# Patient Record
Sex: FEMALE | Race: WHITE | NOT HISPANIC OR LATINO | Employment: FULL TIME | URBAN - METROPOLITAN AREA
[De-identification: names, ages, dates, MRNs, and addresses within clinical notes are randomized per-mention and may not be internally consistent; named-entity substitution may affect disease eponyms.]

---

## 2017-11-27 ENCOUNTER — OFFICE VISIT (OUTPATIENT)
Dept: OBGYN | Facility: CLINIC | Age: 32
End: 2017-11-27
Payer: COMMERCIAL

## 2017-11-27 VITALS
SYSTOLIC BLOOD PRESSURE: 126 MMHG | DIASTOLIC BLOOD PRESSURE: 82 MMHG | WEIGHT: 111 LBS | HEIGHT: 60 IN | BODY MASS INDEX: 21.79 KG/M2

## 2017-11-27 DIAGNOSIS — Z31.69 ENCOUNTER FOR PRECONCEPTION CONSULTATION: ICD-10-CM

## 2017-11-27 DIAGNOSIS — Z01.419 ENCOUNTER FOR GYNECOLOGICAL EXAMINATION WITHOUT ABNORMAL FINDING: Primary | ICD-10-CM

## 2017-11-27 PROCEDURE — 99395 PREV VISIT EST AGE 18-39: CPT | Performed by: OBSTETRICS & GYNECOLOGY

## 2017-11-27 ASSESSMENT — ANXIETY QUESTIONNAIRES
2. NOT BEING ABLE TO STOP OR CONTROL WORRYING: NOT AT ALL
1. FEELING NERVOUS, ANXIOUS, OR ON EDGE: NOT AT ALL
7. FEELING AFRAID AS IF SOMETHING AWFUL MIGHT HAPPEN: NOT AT ALL
GAD7 TOTAL SCORE: 0
3. WORRYING TOO MUCH ABOUT DIFFERENT THINGS: NOT AT ALL
IF YOU CHECKED OFF ANY PROBLEMS ON THIS QUESTIONNAIRE, HOW DIFFICULT HAVE THESE PROBLEMS MADE IT FOR YOU TO DO YOUR WORK, TAKE CARE OF THINGS AT HOME, OR GET ALONG WITH OTHER PEOPLE: NOT DIFFICULT AT ALL
6. BECOMING EASILY ANNOYED OR IRRITABLE: NOT AT ALL
5. BEING SO RESTLESS THAT IT IS HARD TO SIT STILL: NOT AT ALL

## 2017-11-27 ASSESSMENT — PATIENT HEALTH QUESTIONNAIRE - PHQ9
SUM OF ALL RESPONSES TO PHQ QUESTIONS 1-9: 0
5. POOR APPETITE OR OVEREATING: NOT AT ALL

## 2017-11-27 NOTE — MR AVS SNAPSHOT
"              After Visit Summary   2017    Annie Clark    MRN: [id number]           Patient Information     Date Of Birth          1985        Visit Information        Provider Department      2017 11:00 AM Tana Ireland MD [facility] [place]        Today's Diagnoses     Encounter for gynecological examination without abnormal finding    -  1    Encounter for preconception consultation           Follow-ups after your visit        Who to contact     If you have questions or need follow up information about today's clinic visit or your schedule please contact [facility] directly at [phone].  Normal or non-critical lab and imaging results will be communicated to you by Taketakehart, letter or phone within 4 business days after the clinic has received the results. If you do not hear from us within 7 days, please contact the clinic through Taketakehart or phone. If you have a critical or abnormal lab result, we will notify you by phone as soon as possible.  Submit refill requests through Stylefie or call your pharmacy and they will forward the refill request to us. Please allow 3 business days for your refill to be completed.          Additional Information About Your Visit        MyChart Information     Stylefie lets you send messages to your doctor, view your test results, renew your prescriptions, schedule appointments and more. To sign up, go to [email or web address] . Click on \"Log in\" on the left side of the screen, which will take you to the Welcome page. Then click on \"Sign up Now\" on the right side of the page.     You will be asked to enter the access code listed below, as well as some personal information. Please follow the directions to create your username and password.     Your access code is: [email or web address]  Expires: 2018  1:09 PM     Your access code will  in 90 days. If you need help or a new code, please call your [facility] clinic or " "[phone].        Care EveryWhere ID     This is your Care EveryWhere ID. This could be used by other organizations to access your [facility] medical records  [id number]        Your Vitals Were     Height Breastfeeding? BMI (Body Mass Index)             4' 11.5\" (1.511 m) No 22.04 kg/m2          Blood Pressure from Last 3 Encounters:   11/27/17 126/82   11/23/16 104/70   10/30/15 118/76    Weight from Last 3 Encounters:   11/27/17 111 lb (50.3 kg)   11/23/16 123 lb (55.8 kg)   10/30/15 121 lb (54.9 kg)              Today, you had the following     No orders found for display       Primary Care Provider Office Phone # Fax #    Tana Ireland [phone] [address] [facility] [address]  [place] [address]        Equal Access to Services     [facility]: Hadii aad jacqueline hadasho Sogracie, waaxda luqadaha, qaybta kaalmada adeegyada, eren benitez . So [facility] [phone].    ATENCIÓN: Si habla español, tiene a saha disposición servicios gratuitos de asistencia lingüística. Jorge al [phone].    We comply with applicable federal civil rights laws and [place] laws. We do not discriminate on the basis of race, color, national origin, age, disability, sex, sexual orientation, or gender identity.            Thank you!     Thank you for choosing [facility] [place]  for your care. Our goal is always to provide you with excellent care. Hearing back from our patients is one way we can continue to improve our services. Please take a few minutes to complete the written survey that you may receive in the mail after your visit with us. Thank you!             Your Updated Medication List - Protect others around you: Learn how to safely use, store and throw away your medicines at www.disposemymeds.org.          This list is accurate as of: 11/27/17  1:09 PM.  Always use your most recent med list.                   Brand Name Dispense Instructions for use Diagnosis    desonide " 0.05 % ointment    DESOWEN          loratadine 10 MG tablet    CLARITIN     Take 10 mg by mouth daily        norethindrone-ethinyl estradiol 1.5-30 MG-MCG per tablet    MICROGESTIN    112 tablet    TAKE 1 TABLET BY MOUTH DAILY IN A CONTINUOUS FASHION    Uses oral contraception       PseudoePHEDrine-guaiFENesin 120-1200 MG Tb12

## 2017-11-27 NOTE — PROGRESS NOTES
Annie is a 32 year old  female who presents for annual exam.     Besides routine health maintenance,  she would like to discuss preconception counseling.    HPI:  The patient does not have a PCP    Got  in July and everything went great. Went to [place] for a honeymoon.  Both are starting to feel ready for kids. 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  Has done continuous ocps for years and completely amenorrhea so wondering when to stop ocp and when to try. Just got a 3 month rx filled. Taking a PNV    Patient's dad and his father with prostate cancer in their 60s. B/c of that she is now getting set up for genetic counseling and possibly testing for her as well.    Weren't s.a for 9 months before getting  but were before that. A little discomfort when did on her wedding night but has been fine since the first couple of times.      GYNECOLOGIC HISTORY:    No LMP recorded. Patient is not currently having periods (Reason: Birth Control).  Her current contraception method is: oral contraceptives.  She  reports that she has never smoked. She has never used smokeless tobacco.      Patient is sexually active.  STD testing offered?  Declined  Last PHQ-9 score on record =   PHQ-9 SCORE 2017   Total Score 0     Last GAD7 score on record =   ROBERT-7 SCORE 2017   Total Score 0     Alcohol Score = 3    HEALTH MAINTENANCE:  Cholesterol: (No results found for: CHOL NA  Last Mammo: NA, Result: not applicable, Next Mammo: NA  Pap: (  Lab Results   Component Value Date    PAP NIL 10/30/2015    10/30/15 WNL  Colonoscopy:  NA, Result: not applicable, Next Colonoscopy: NA years.  Dexa:  NA    Health maintenance updated:  yes    HISTORY:  Obstetric History       T0      L0     SAB0   TAB0   Ectopic0   Multiple0   Live Births0           Patient Active Problem List   Diagnosis     Urinary urgency     Contraception, generic  "surveillance     Past Surgical History:   Procedure Laterality Date     C TRANSNASAL EUSTACH TUBE INFLATE,CATH       IP DIET ADULT PUREED       PARTIAL HYMENECTOMY/REVISION HYMENAL RING  3/5/08    Done by Himanshu     TONSILLECTOMY        Social History   Substance Use Topics     Smoking status: Never Smoker     Smokeless tobacco: Never Used     Alcohol use 0.0 oz/week     0 Standard drinks or equivalent per week      Problem (# of Occurrences) Relation (Name,Age of Onset)    CANCER (1) Paternal Grandmother: had uterus removed at age 30 but unsure if it was uterine cancer or something else    DIABETES (1) Maternal Grandfather    Hyperlipidemia (2) Father, Maternal Grandmother    OSTEOPOROSIS (2) Maternal Grandmother, Mother    Prostate Cancer (2) Father (63), Paternal Grandfather:  from it in his 60s    Thyroid Disease (2) Maternal Grandmother, Mother            Current Outpatient Prescriptions   Medication Sig     desonide (DESOWEN) 0.05 % ointment      norethindrone-ethinyl estradiol (MICROGESTIN) 1.5-30 MG-MCG per tablet TAKE 1 TABLET BY MOUTH DAILY IN A CONTINUOUS FASHION     loratadine (CLARITIN) 10 MG tablet Take 10 mg by mouth daily     PseudoePHEDrine-guaiFENesin 120-1200 MG TB12      No current facility-administered medications for this visit.      Allergies   Allergen Reactions     Nuts      Thimerosal        Past medical, surgical, social and family histories were reviewed and updated in EP[facility].    ROS:   12 point review of systems negative other than symptoms noted below.    EXAM:  /82  Ht 4' 11.5\" (1.511 m)  Wt 111 lb (50.3 kg)  Breastfeeding? No  BMI 22.04 kg/m2   BMI: Body mass index is 22.04 kg/(m^2).    PHYSICAL EXAM:  Constitutional:  Appearance: Well nourished, well developed, alert, in no acute distress  Neck:  Lymph Nodes:  No lymphadenopathy present    Thyroid:  Gland size normal, nontender, no nodules or masses present  on palpation  Chest:  Respiratory Effort:  Breathing " unlabored  Cardiovascular:    Heart: Auscultation:  Regular rate, normal rhythm, no murmurs present  Breasts: Palpation of Breasts and Axillae:  No masses present on palpation, no breast tenderness. and No nodularity, asymmetry or nipple discharge bilaterally.  Gastrointestinal:   Abdominal Examination:  Abdomen nontender to palpation, tone normal without rigidity or guarding, no masses present, umbilicus without lesions   Liver and Spleen:  No hepatomegaly present, liver nontender to palpation    Hernias:  No hernias present  Lymphatic: Lymph Nodes:  No other lymphadenopathy present  Skin:  General Inspection:  No rashes present, no lesions present, no areas of  discoloration    Genitalia and Groin:  No rashes present, no lesions present, no areas of  discoloration, no masses present  Neurologic/Psychiatric:    Mental Status:  Oriented X3     Pelvic Exam:  External Genitalia:     Normal appearance for age, no discharge present, no tenderness present, no inflammatory lesions present, color normal  Vagina:     Normal vaginal vault without central or paravaginal defects, no discharge present, no inflammatory lesions present, no masses present  Bladder:     Nontender to palpation  Urethra:   Urethral Body:  Urethra palpation normal, urethra structural support normal   Urethral Meatus:  No erythema or lesions present  Cervix:     Appearance healthy, no lesions present, nontender to palpation, no bleeding present  Uterus:     Uterus: firm, normal sized and nontender, retroverted in position.   Adnexa:     No adnexal tenderness present, no adnexal masses present  Perineum:     Perineum within normal limits, no evidence of trauma, no rashes or skin lesions present  Anus:     Anus within normal limits, no hemorrhoids present  Inguinal Lymph Nodes:     No lymphadenopathy present  Pubic Hair:     Normal pubic hair distribution for age  Genitalia and Groin:     No rashes present, no lesions present, no areas of discoloration,  no masses present      COUNSELING:   Reviewed preventive health counseling, as reflected in patient instructions  Special attention given to:        Family planning    BMI: Body mass index is 22.04 kg/(m^2).        ASSESSMENT:  32 year old female with satisfactory annual exam.    ICD-10-CM    1. Encounter for gynecological examination without abnormal finding Z01.419    2. Encounter for preconception consultation Z31.69        PLAN:  Pap and cotesting will be due next year  Continue PNV. Would need to get pregnant end of may/beginning of June to delivery when she'd like. Encouraged to stop ocps only when ready to do so. B/c has done cont so long and no periods may just stop them a couple months in advance to get regulated and use condoms during that time. If wants more refills on ocps will contact us but may just do these next three months and then stop  Will let me know once genetic testing is back if anything is positive that could impact her health and need certain screening tests like mammo earlier than normal.    Tana Ireland MD

## 2017-11-28 ASSESSMENT — ANXIETY QUESTIONNAIRES: GAD7 TOTAL SCORE: 0

## 2018-01-09 ENCOUNTER — TRANSFERRED RECORDS (OUTPATIENT)
Dept: HEALTH INFORMATION MANAGEMENT | Facility: CLINIC | Age: 33
End: 2018-01-09

## 2018-10-29 ENCOUNTER — TELEPHONE (OUTPATIENT)
Dept: OBGYN | Facility: CLINIC | Age: 33
End: 2018-10-29

## 2018-10-29 NOTE — TELEPHONE ENCOUNTER
LMP 9/25/18. Pt is wondering if she can get her hair highlighted tomorrow. Pt had pos UPT yesterday. Pt informed it is OK to have hair highlighted. Salons are well ventilated. No further questions.

## 2018-11-13 ENCOUNTER — TELEPHONE (OUTPATIENT)
Dept: OBGYN | Facility: CLINIC | Age: 33
End: 2018-11-13

## 2018-11-13 NOTE — TELEPHONE ENCOUNTER
Pt is moving into a house in a week and some relatives were coming over to paint for them.  Pt is wondering how long she needs to stay out of the house because of the paint fumes.

## 2018-11-13 NOTE — TELEPHONE ENCOUNTER
Pt is newly pregnant. Moving to new house. Painting is to be done. Worried about paint fumes. Pt advised as long as she has fresh source of air she is OK. If she is more comfortable leaving stay away for about 24 hours and then can come back to house. No further questions.

## 2018-12-03 ENCOUNTER — PRENATAL OFFICE VISIT (OUTPATIENT)
Dept: OBGYN | Facility: CLINIC | Age: 33
End: 2018-12-03
Payer: COMMERCIAL

## 2018-12-03 ENCOUNTER — TRANSFERRED RECORDS (OUTPATIENT)
Dept: HEALTH INFORMATION MANAGEMENT | Facility: CLINIC | Age: 33
End: 2018-12-03

## 2018-12-03 VITALS
HEIGHT: 60 IN | DIASTOLIC BLOOD PRESSURE: 76 MMHG | HEART RATE: 88 BPM | BODY MASS INDEX: 21.44 KG/M2 | SYSTOLIC BLOOD PRESSURE: 132 MMHG | WEIGHT: 109.2 LBS

## 2018-12-03 DIAGNOSIS — Z13.79 GENETIC SCREENING: ICD-10-CM

## 2018-12-03 DIAGNOSIS — Q51.3 UTERUS BICORNIS AFFECTING PREGNANCY IN FIRST TRIMESTER: ICD-10-CM

## 2018-12-03 DIAGNOSIS — Z23 NEED FOR PROPHYLACTIC VACCINATION AND INOCULATION AGAINST INFLUENZA: ICD-10-CM

## 2018-12-03 DIAGNOSIS — O34.01 UTERUS BICORNIS AFFECTING PREGNANCY IN FIRST TRIMESTER: ICD-10-CM

## 2018-12-03 DIAGNOSIS — O09.91 SUPERVISION OF HIGH RISK PREGNANCY IN FIRST TRIMESTER: Primary | ICD-10-CM

## 2018-12-03 LAB
ABO + RH BLD: NORMAL
ABO + RH BLD: NORMAL
ALBUMIN UR-MCNC: NEGATIVE MG/DL
APPEARANCE UR: CLEAR
BILIRUB UR QL STRIP: NEGATIVE
BLD GP AB SCN SERPL QL: NORMAL
BLOOD BANK CMNT PATIENT-IMP: NORMAL
COLOR UR AUTO: YELLOW
ERYTHROCYTE [DISTWIDTH] IN BLOOD BY AUTOMATED COUNT: 11.2 % (ref 10–15)
GLUCOSE UR STRIP-MCNC: NEGATIVE MG/DL
HCT VFR BLD AUTO: 42.2 % (ref 35–47)
HGB BLD-MCNC: 14.9 G/DL (ref 11.7–15.7)
HGB UR QL STRIP: NEGATIVE
KETONES UR STRIP-MCNC: NEGATIVE MG/DL
LEUKOCYTE ESTERASE UR QL STRIP: NEGATIVE
MCH RBC QN AUTO: 31.2 PG (ref 26.5–33)
MCHC RBC AUTO-ENTMCNC: 35.3 G/DL (ref 31.5–36.5)
MCV RBC AUTO: 89 FL (ref 78–100)
NITRATE UR QL: NEGATIVE
PH UR STRIP: 7.5 PH (ref 5–7)
PLATELET # BLD AUTO: 237 10E9/L (ref 150–450)
RBC # BLD AUTO: 4.77 10E12/L (ref 3.8–5.2)
SOURCE: ABNORMAL
SP GR UR STRIP: 1.01 (ref 1–1.03)
SPECIMEN EXP DATE BLD: NORMAL
UROBILINOGEN UR STRIP-ACNC: 0.2 EU/DL (ref 0.2–1)
WBC # BLD AUTO: 9.5 10E9/L (ref 4–11)

## 2018-12-03 PROCEDURE — 86900 BLOOD TYPING SEROLOGIC ABO: CPT | Performed by: OBSTETRICS & GYNECOLOGY

## 2018-12-03 PROCEDURE — 86901 BLOOD TYPING SEROLOGIC RH(D): CPT | Performed by: OBSTETRICS & GYNECOLOGY

## 2018-12-03 PROCEDURE — 87340 HEPATITIS B SURFACE AG IA: CPT | Performed by: OBSTETRICS & GYNECOLOGY

## 2018-12-03 PROCEDURE — 90471 IMMUNIZATION ADMIN: CPT | Performed by: OBSTETRICS & GYNECOLOGY

## 2018-12-03 PROCEDURE — 87086 URINE CULTURE/COLONY COUNT: CPT | Performed by: OBSTETRICS & GYNECOLOGY

## 2018-12-03 PROCEDURE — 87389 HIV-1 AG W/HIV-1&-2 AB AG IA: CPT | Performed by: OBSTETRICS & GYNECOLOGY

## 2018-12-03 PROCEDURE — 86780 TREPONEMA PALLIDUM: CPT | Performed by: OBSTETRICS & GYNECOLOGY

## 2018-12-03 PROCEDURE — 81003 URINALYSIS AUTO W/O SCOPE: CPT | Performed by: OBSTETRICS & GYNECOLOGY

## 2018-12-03 PROCEDURE — 86850 RBC ANTIBODY SCREEN: CPT | Performed by: OBSTETRICS & GYNECOLOGY

## 2018-12-03 PROCEDURE — 99207 ZZC FIRST OB VISIT: CPT | Performed by: OBSTETRICS & GYNECOLOGY

## 2018-12-03 PROCEDURE — 36415 COLL VENOUS BLD VENIPUNCTURE: CPT | Performed by: OBSTETRICS & GYNECOLOGY

## 2018-12-03 PROCEDURE — 86762 RUBELLA ANTIBODY: CPT | Performed by: OBSTETRICS & GYNECOLOGY

## 2018-12-03 PROCEDURE — 85027 COMPLETE CBC AUTOMATED: CPT | Performed by: OBSTETRICS & GYNECOLOGY

## 2018-12-03 PROCEDURE — 90686 IIV4 VACC NO PRSV 0.5 ML IM: CPT | Performed by: OBSTETRICS & GYNECOLOGY

## 2018-12-03 ASSESSMENT — ANXIETY QUESTIONNAIRES
2. NOT BEING ABLE TO STOP OR CONTROL WORRYING: NOT AT ALL
3. WORRYING TOO MUCH ABOUT DIFFERENT THINGS: NOT AT ALL
1. FEELING NERVOUS, ANXIOUS, OR ON EDGE: NOT AT ALL
5. BEING SO RESTLESS THAT IT IS HARD TO SIT STILL: NOT AT ALL
7. FEELING AFRAID AS IF SOMETHING AWFUL MIGHT HAPPEN: NOT AT ALL
6. BECOMING EASILY ANNOYED OR IRRITABLE: NOT AT ALL
IF YOU CHECKED OFF ANY PROBLEMS ON THIS QUESTIONNAIRE, HOW DIFFICULT HAVE THESE PROBLEMS MADE IT FOR YOU TO DO YOUR WORK, TAKE CARE OF THINGS AT HOME, OR GET ALONG WITH OTHER PEOPLE: NOT DIFFICULT AT ALL
GAD7 TOTAL SCORE: 0

## 2018-12-03 ASSESSMENT — PATIENT HEALTH QUESTIONNAIRE - PHQ9
5. POOR APPETITE OR OVEREATING: NOT AT ALL
SUM OF ALL RESPONSES TO PHQ QUESTIONS 1-9: 0

## 2018-12-03 NOTE — PROGRESS NOTES
Injectable Influenza Immunization Documentation    1.  Is the person to be vaccinated sick today?   No    2. Does the person to be vaccinated have an allergy to a component   of the vaccine?   No  Egg Allergy Algorithm Link    3. Has the person to be vaccinated ever had a serious reaction   to influenza vaccine in the past?   No    4. Has the person to be vaccinated ever had Guillain-Barré syndrome?   No    Form completed by Leila Hendricks CMA on 12/3/2018 at 3:23 PM

## 2018-12-03 NOTE — Clinical Note
Patient had her viability U/S at [facility] and told heart shaped uterus. I was able to review images and though they didn't capture exactly what I needed which was a full frontal/coronal look at the uterus it appears to be a true bicornuate. Could we call her and tell her that I reviewed images and it looks bicornuate. What i'd like to do is have her come back in 2-3 weeks as we planned but rather than just doing doptones i'd like her to have an U/S with our techs(hayley ledesma) to get additional images to I can determine exactly how separate the horns are or if it's just a deep septum and then we can discuss more from there. Hopefully they can just add an U/S on before her appointment with me.

## 2018-12-03 NOTE — PROGRESS NOTES
"  SUBJECTIVE:     HPI:    This is a 33 year old female patient,  who presents for her first obstetrical visit.    JAMES: 2019, by Last Menstrual Period.  She is 9w6d weeks.  Her cycles are regular.  Her last menstrual period was normal.   Since her LMP, she has experienced  fatigue and tenderness, dizziness).   She denies nausea, emesis, abdominal pain, headache, loss of appetite, vaginal discharge, dysuria, pelvic pain, urinary urgency, lightheadedness, urinary frequency, vaginal bleeding, hemorrhoids and constipation.    Additional History: patient has had significant dysmenorrhea in the past but ocps always helped. Has never had an U/S  Patient had her viability U/S at [facility] today b/c our tech was out and they told her she had a \"heart shaped uterus\". Never knew that before  Has not had cramping or bleeding or pain.  Definitely bad breast tenderness and fatigue. Tiny waves of mild nausea but really not bad  Periods were regular and her LMP edc and her U/S edc are right on with each other  Discussed genetic testing. Likely won't do it b/c wouldn't ever terminate and don't want to worry about results  Needs a flu shot    Have you travelled during the pregnancy?No  Have your sexual partner(s) travelled during the pregnancy?No      HISTORY:   Planned Pregnancy: Yes  Marital Status:   Occupation: Special Education Para  Living in Household: Spouse    Past History:  Her past medical history   Past Medical History:   Diagnosis Date     Monoallelic mutation of BRIP1 gene      Urinary urgency 10/30/2015   .      She has a history of  First pregnancy    Since her last LMP she denies use of alcohol, tobacco and street drugs.    Past medical, surgical, social and family history were reviewed and updated in [facility].        Current Outpatient Prescriptions   Medication     loratadine (CLARITIN) 10 MG tablet     desonide (DESOWEN) 0.05 % ointment     norethindrone-ethinyl estradiol (MICROGESTIN) 1.5-30 MG-MCG per " "tablet     PseudoePHEDrine-guaiFENesin 120-1200 MG TB12     No current facility-administered medications for this visit.        ROS:   12 point review of systems negative other than symptoms noted below.  Constitutional: Fatigue  Breast: Tenderness  Neurologic: Dizziness      OBJECTIVE:     EXAM:  /76 (BP Location: Right arm, Patient Position: Sitting, Cuff Size: Adult Regular)  Pulse 88  Ht 4' 11.75\" (1.518 m)  Wt 109 lb 3.2 oz (49.5 kg)  LMP 09/25/2018  BMI 21.51 kg/m2 Body mass index is 21.51 kg/(m^2).    GENERAL: healthy, alert and no distress  NECK: no adenopathy, no asymmetry, masses, or scars and thyroid normal to palpation  RESP: lungs clear to auscultation - no rales, rhonchi or wheezes  BREAST: normal without masses, tenderness or nipple discharge and no palpable axillary masses or adenopathy  CV: regular rate and rhythm, normal S1 S2, no S3 or S4, no murmur, click or rub, no peripheral edema and peripheral pulses strong  ABDOMEN: soft, nontender, no hepatosplenomegaly, no masses and bowel sounds normal  MS: no gross musculoskeletal defects noted, no edema  SKIN: no suspicious lesions or rashes  NEURO: Normal strength and tone, mentation intact and speech normal  PSYCH: mentation appears normal, affect normal/bright    ASSESSMENT/PLAN:       ICD-10-CM    1. Supervision of high risk pregnancy in first trimester O09.91 ABO/Rh type and screen     Hepatitis B surface antigen     CBC with platelets     HIV Antigen Antibody Combo     Rubella Antibody IgG Quantitative     Treponema Abs w Reflex to RPR and Titer     Urine Culture Aerobic Bacterial     *UA reflex to Microscopic     US OB Transvaginal Only   2. 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 Genetic screening Z13.79    4. Need for prophylactic vaccination and inoculation against influenza Z23 ABO/Rh type and screen     Hepatitis B surface antigen     CBC with platelets     HIV Antigen " Antibody Combo     Rubella Antibody IgG Quantitative     Treponema Abs w Reflex to RPR and Titer     FLU VACCINE, SPLIT VIRUS, IM (QUADRIVALENT) [26362]- >3 YRS     Vaccine Administration, Initial [63583]       33 year old , 9w6d weeks of pregnancy with JAMES of 2019, by Last Menstrual Period and ultrasound          PLAN/PATIENT INSTRUCTIONS:    Patient's EDC is right on track with U/S from LMP so will use   Can't access [facility] portal to look at her U/S to eval her uterus at this time but will connect with her after I am able to see the actual images and not just the report and let her know my opinion on it.  Discussed that sometimes with uterine issues like this there is a higher risk of  labor but also malposition like breech and sometimes labor dystocia. Could also be higher risk for PPH, etc. Will continue to assess this as we progress in pregnancy  NOB labs and flu shot today. Declines progenity test but if changes her mind can contact us  Return in 2 weeks for doptones and then will get back on track with q4 week visits.    ADDENDUM:after patient left was able to view images from [facility]. Does appear to have a true bicornuate with preg in left horn. However there was mostly transverse images and not a great frontal image to assess degree of horn separation vs deep septum. Not didelphys b/c only has one cervix. Will likely reimage her at her next visit with our tech here and get better coronal views to determine that.  No specific interventions recommended but just more close monitoring for si/sx of PTL     Tana Ireland MD      Prenatal OB Questionnaire      Allergies as of 12/3/2018:    Allergies as of 2018 - Eliseo as Reviewed 2018   Allergen Reaction Noted     Nuts  10/30/2015     Thimerosal  10/30/2015       Current medications are:  Current Outpatient Prescriptions   Medication Sig Dispense Refill     loratadine (CLARITIN) 10 MG tablet Take 10 mg by mouth daily        desonide (DESOWEN) 0.05 % ointment        norethindrone-ethinyl estradiol (MICROGESTIN) 1.5-30 MG-MCG per tablet TAKE 1 TABLET BY MOUTH DAILY IN A CONTINUOUS FASHION (Patient not taking: Reported on 12/3/2018) 112 tablet 4     PseudoePHEDrine-guaiFENesin 120-1200 MG TB12            Early ultrasound screening tool:    Does patient have irregular periods?  No  Did patient use hormonal birth control in the three months prior to positive urine pregnancy test? No  Is the patient breastfeeding?  No  Is the patient 10 weeks or greater at time of education visit?  No    Viable IUP at 9w6d

## 2018-12-03 NOTE — MR AVS SNAPSHOT
After Visit Summary   12/3/2018    Annie Clark    MRN: [id number]           Patient Information     Date Of Birth          1985        Visit Information        Provider Department      12/3/2018 1:30 PM Tana Ireland MD; WE TRIAGE [facility]        Today's Diagnoses     Encounter for supervision of normal first pregnancy in first trimester    -  1    Genetic screening        Need for prophylactic vaccination and inoculation against influenza        Screening for malignant neoplasm of cervix           Follow-ups after your visit        Your next 10 appointments already scheduled     Dec 26, 2018  1:10 PM CST   ESTABLISHED PRENATAL with Tana Ireland MD   [facility] ([facility])    [address] [place] [address] [address]  [facility] [address]   [phone]              Who to contact     If you have questions or need follow up information about today's clinic visit or your schedule please contact [facility] directly at [phone].  Normal or non-critical lab and imaging results will be communicated to you by Cortexhart, letter or phone within 4 business days after the clinic has received the results. If you do not hear from us within 7 days, please contact the clinic through Cortexhart or phone. If you have a critical or abnormal lab result, we will notify you by phone as soon as possible.  Submit refill requests through SearchMan SEO or call your pharmacy and they will forward the refill request to us. Please allow 3 business days for your refill to be completed.          Additional Information About Your Visit        Cortexhart Information     [employer] gives you secure access to your electronic health record. If you see a primary care provider, you can also send messages to your care team and make appointments. If you have questions, please call your primary care clinic.  If you do not have a primary care provider, please call  "[phone] and they will assist you.        Care EveryWhere ID     This is your Care EveryWhere ID. This could be used by other organizations to access your [facility] medical records  [id number]        Your Vitals Were     Pulse Height Last Period BMI (Body Mass Index)          88 4' 11.75\" (1.518 m) 09/25/2018 21.51 kg/m2         Blood Pressure from Last 3 Encounters:   12/03/18 132/76   11/27/17 126/82   11/23/16 104/70    Weight from Last 3 Encounters:   12/03/18 109 lb 3.2 oz (49.5 kg)   11/27/17 111 lb (50.3 kg)   11/23/16 123 lb (55.8 kg)              We Performed the Following     *UA reflex to Microscopic     ABO/Rh type and screen     CBC with platelets     Hepatitis B surface antigen     HIV Antigen Antibody Combo     Rubella Antibody IgG Quantitative     Treponema Abs w Reflex to RPR and Titer     Urine Culture Aerobic Bacterial        Primary Care Provider Office Phone # Fax #    Tana Ireland [phone] [address] [facility] [address]  [facility] [address]        Equal Access to Services     [facility]: Hadii aad ku hadasho Sogracie, waaxda luqadaha, qaybta kaalmada christian, eren benitez . So [facility] [phone].    ATENCIÓN: Si habla español, tiene a saha disposición servicios gratuitos de asistencia lingüística. [facility] [phone].    We comply with applicable federal civil rights laws and [place] laws. We do not discriminate on the basis of race, color, national origin, age, disability, sex, sexual orientation, or gender identity.            Thank you!     Thank you for choosing [facility]  for your care. Our goal is always to provide you with excellent care. Hearing back from our patients is one way we can continue to improve our services. Please take a few minutes to complete the written survey that you may receive in the mail after your visit with us. Thank you!             Your Updated Medication List - Protect others around " you: Learn how to safely use, store and throw away your medicines at www.disposemymeds.org.          This list is accurate as of 12/3/18  3:07 PM.  Always use your most recent med list.                   Brand Name Dispense Instructions for use Diagnosis    desonide 0.05 % external ointment    DESOWEN          loratadine 10 MG tablet    CLARITIN     Take 10 mg by mouth daily        norethindrone-ethinyl estradiol 1.5-30 MG-MCG tablet    MICROGESTIN    112 tablet    TAKE 1 TABLET BY MOUTH DAILY IN A CONTINUOUS FASHION    Uses oral contraception       PseudoePHEDrine-guaiFENesin 120-1200 MG Tb12

## 2018-12-04 LAB
BACTERIA SPEC CULT: NO GROWTH
HBV SURFACE AG SERPL QL IA: NONREACTIVE
HIV 1+2 AB+HIV1 P24 AG SERPL QL IA: NONREACTIVE
Lab: NORMAL
RUBV IGG SERPL IA-ACNC: 53 IU/ML
SPECIMEN SOURCE: NORMAL
T PALLIDUM AB SER QL: NONREACTIVE

## 2018-12-04 ASSESSMENT — ANXIETY QUESTIONNAIRES: GAD7 TOTAL SCORE: 0

## 2018-12-06 ENCOUNTER — TELEPHONE (OUTPATIENT)
Dept: OBGYN | Facility: CLINIC | Age: 33
End: 2018-12-06

## 2018-12-06 PROBLEM — O09.891 SUPERVISION OF OTHER HIGH RISK PREGNANCIES, FIRST TRIMESTER: Status: RESOLVED | Noted: 2018-12-06 | Resolved: 2018-12-06

## 2018-12-06 PROBLEM — O34.00: Status: ACTIVE | Noted: 2018-12-06

## 2018-12-06 PROBLEM — O09.90 PREGNANCY, SUPERVISION, HIGH-RISK: Status: ACTIVE | Noted: 2018-12-06

## 2018-12-06 PROBLEM — Q51.3: Status: ACTIVE | Noted: 2018-12-06

## 2018-12-06 PROBLEM — O09.891 SUPERVISION OF OTHER HIGH RISK PREGNANCIES, FIRST TRIMESTER: Status: ACTIVE | Noted: 2018-12-06

## 2018-12-06 NOTE — TELEPHONE ENCOUNTER
Pt informed. Will come for US at 12:30. Also asked about using cleaning supplies. Informed to be in well ventilated area.

## 2018-12-06 NOTE — TELEPHONE ENCOUNTER
Tana Ireland MD  P We Triage                     Patient had her viability U/S at sub rad and told heart shaped uterus. I was able to review images and though they didn't capture exactly what I needed which was a full frontal/coronal look at the uterus it appears to be a true bicornuate. Could we call her and tell her that I reviewed images and it looks bicornuate. What i'd like to do is have her come back in 2-3 weeks as we planned but rather than just doing doptones i'd like her to have an U/S with our techs(hayley ledesma) to get additional images to I can determine exactly how separate the horns are or if it's just a deep septum and then we can discuss more from there. Hopefully they can just add an U/S on before her appointment with me.              Prenatal Office Visit for Prenatal Care; Imm/Inj  12/3/2018       Tana Ireland MD - [facility] [place]; 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; Genetic screening; Need for prophylactic vaccination and inoculation against influenza                Orders Signed This Encounter (11) View All Results      US OB Transvaginal Only        Vaccine Administration, Initial [07070]        FLU VACCINE, SPLIT VIRUS, IM (QUADRIVALENT) [77025]- >3 YRS        *UA reflex to Microscopic View Result       Urine Culture Aerobic Bacterial View Result       Treponema Abs w Reflex to RPR and Titer View Result       Rubella Antibody IgG Quantitative View Result       HIV Antigen Antibody Combo View Result       CBC with platelets View Result       Hepatitis B surface antigen View Result       ABO/Rh type and screen View Result                Orders Pended This Encounter         Left msg for pt to call back. Pt has been scheduled for an US on 11/26 before her appt with Dr. Ireland at 1230. Pt should arrive around 1215.  This time slot has been approved by Shweta in [place].  Crystal De Guzman RN on 12/6/2018 at 9:28 AM

## 2018-12-26 ENCOUNTER — ANCILLARY PROCEDURE (OUTPATIENT)
Dept: ULTRASOUND IMAGING | Facility: CLINIC | Age: 33
End: 2018-12-26
Payer: COMMERCIAL

## 2018-12-26 ENCOUNTER — PRENATAL OFFICE VISIT (OUTPATIENT)
Dept: OBGYN | Facility: CLINIC | Age: 33
End: 2018-12-26
Payer: COMMERCIAL

## 2018-12-26 VITALS — WEIGHT: 109.8 LBS | BODY MASS INDEX: 21.62 KG/M2 | SYSTOLIC BLOOD PRESSURE: 104 MMHG | DIASTOLIC BLOOD PRESSURE: 60 MMHG

## 2018-12-26 DIAGNOSIS — Q51.3 UTERUS BICORNIS AFFECTING PREGNANCY IN FIRST TRIMESTER: ICD-10-CM

## 2018-12-26 DIAGNOSIS — O34.01 UTERUS BICORNIS AFFECTING PREGNANCY IN FIRST TRIMESTER: ICD-10-CM

## 2018-12-26 DIAGNOSIS — O09.91 SUPERVISION OF HIGH RISK PREGNANCY IN FIRST TRIMESTER: ICD-10-CM

## 2018-12-26 DIAGNOSIS — O09.91 SUPERVISION OF HIGH RISK PREGNANCY IN FIRST TRIMESTER: Primary | ICD-10-CM

## 2018-12-26 PROCEDURE — 99207 ZZC PRENATAL VISIT: CPT | Performed by: OBSTETRICS & GYNECOLOGY

## 2018-12-26 PROCEDURE — 76817 TRANSVAGINAL US OBSTETRIC: CPT | Performed by: OBSTETRICS & GYNECOLOGY

## 2018-12-26 NOTE — PROGRESS NOTES
Patient has questions about medication intake. Has congestion. She wants to know if there are specific vitamins she should also be taking other than prenatal vitamins.

## 2018-12-28 NOTE — PROGRESS NOTES
Patient is feeling well. Breast pain is better and fatigue is better. Never really had all that much nausea  rescanned today as her first viability U/S was at [facility] and images weren't very clear  Patient does have a full bicornuate uterus with pregnancy on the left and tracking right along in terms of dates  Discussed again the higher risk of PTL, malpresentation, c/s need, etc. Unlikely to have any issues in the first few months and more than likely will get to at least viability w/o PTL issues and then will proceed with testing and  surveillance as indicated  Return 3 weeks and offer AFP/Quad though likely will decline

## 2019-01-14 ENCOUNTER — PRENATAL OFFICE VISIT (OUTPATIENT)
Dept: OBGYN | Facility: CLINIC | Age: 34
End: 2019-01-14
Payer: COMMERCIAL

## 2019-01-14 VITALS — BODY MASS INDEX: 21.94 KG/M2 | DIASTOLIC BLOOD PRESSURE: 62 MMHG | WEIGHT: 111.4 LBS | SYSTOLIC BLOOD PRESSURE: 110 MMHG

## 2019-01-14 DIAGNOSIS — O09.92 SUPERVISION OF HIGH RISK PREGNANCY IN SECOND TRIMESTER: Primary | ICD-10-CM

## 2019-01-14 DIAGNOSIS — O34.02 UTERUS BICORNIS AFFECTING PREGNANCY IN SECOND TRIMESTER: ICD-10-CM

## 2019-01-14 DIAGNOSIS — Q51.3 UTERUS BICORNIS AFFECTING PREGNANCY IN SECOND TRIMESTER: ICD-10-CM

## 2019-01-14 PROCEDURE — 99207 ZZC PRENATAL VISIT: CPT | Performed by: OBSTETRICS & GYNECOLOGY

## 2019-01-15 NOTE — PROGRESS NOTES
"Patient is feeling really well. Fatigue is much better for most part though still occasionally  Not getting any bad HAs and nausea is gone  Finally had a couple pounds of weight gain  Discussed lots of things related to how a c/s is done and the recovery. 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  Understands there's no way to predict that at this point given the biccornuate uterus but talked through some of the signs we would look for  Discussed quad and AFP and declines them both  Return 4 weeks with anatomy scan and look more closely at how the bicornuate is \"stretching\" and fetal position, etc  "

## 2019-01-27 ENCOUNTER — NURSE TRIAGE (OUTPATIENT)
Dept: NURSING | Facility: CLINIC | Age: 34
End: 2019-01-27

## 2019-01-27 NOTE — TELEPHONE ENCOUNTER
Patient is 18 weeks pregnant, has had nasal congestion and scratchy throat for past 4-5 days, no fever. Reviewed list of meds OK for pregnancy from One-Note. Patient verbalized understanding.  Sharla Maxwell RN  [facility]

## 2019-01-28 ENCOUNTER — TELEPHONE (OUTPATIENT)
Dept: OBGYN | Facility: CLINIC | Age: 34
End: 2019-01-28

## 2019-01-28 DIAGNOSIS — J02.9 SORE THROAT: Primary | ICD-10-CM

## 2019-01-28 RX ORDER — BENZONATATE 100 MG/1
100 CAPSULE ORAL 3 TIMES DAILY PRN
Qty: 30 CAPSULE | Refills: 0 | Status: SHIPPED | OUTPATIENT
Start: 2019-01-28 | End: 2019-02-13

## 2019-01-28 NOTE — TELEPHONE ENCOUNTER
Primip 17w6d  Pt calling regarding symptoms of nasal congestion, productive cough (has improved), red, swollen throat since last Friday. Afebrile.  Spoke with FNA yesterday as well. Pt has been using sudafed-tried mucinex today, afrin, tylenol, throat lozenges, is using a humidifer.  Pt is staying hydrated, tolerating tea, soup, popsicles, ice chips. Advised pt she could also try gargling salt water for comfort.   Pt is mostly concerned about her red, swollen, throat. Discussed that sounds more viral and the throat is probably irritated due to drainage and coughing.     Will route to provider to advise if any further advice for pt.  Will call pt back with any further recommendations o/w can close encounter    Pt knows to call back if symptoms do not improve or worsen    Crystal De Guzman RN on 1/28/2019 at 1:46 PM

## 2019-01-28 NOTE — TELEPHONE ENCOUNTER
Pt informed. Would like to have Rx for Tessalon pereles. Rx sent. Pt will think about throat culture and call tomorrow if wants to come in.

## 2019-01-28 NOTE — TELEPHONE ENCOUNTER
There is definitely a virus going around with all of these sx. Could come in for a rapid strep and throat culture. 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 O/w can try some tessalon pereles, it's an rx. Helps to numb the back of the throat a bit and helps with cough and sore throat. Warm honey tea can soothe the throat as well

## 2019-02-13 ENCOUNTER — PRENATAL OFFICE VISIT (OUTPATIENT)
Dept: OBGYN | Facility: CLINIC | Age: 34
End: 2019-02-13
Payer: COMMERCIAL

## 2019-02-13 ENCOUNTER — ANCILLARY PROCEDURE (OUTPATIENT)
Dept: ULTRASOUND IMAGING | Facility: CLINIC | Age: 34
End: 2019-02-13
Payer: COMMERCIAL

## 2019-02-13 VITALS — BODY MASS INDEX: 22.65 KG/M2 | WEIGHT: 115 LBS | SYSTOLIC BLOOD PRESSURE: 96 MMHG | DIASTOLIC BLOOD PRESSURE: 66 MMHG

## 2019-02-13 DIAGNOSIS — O09.92 SUPERVISION OF HIGH RISK PREGNANCY IN SECOND TRIMESTER: ICD-10-CM

## 2019-02-13 DIAGNOSIS — Q51.3 UTERUS BICORNIS AFFECTING PREGNANCY IN SECOND TRIMESTER: ICD-10-CM

## 2019-02-13 DIAGNOSIS — O34.02 UTERUS BICORNIS AFFECTING PREGNANCY IN SECOND TRIMESTER: ICD-10-CM

## 2019-02-13 DIAGNOSIS — O09.92 SUPERVISION OF HIGH RISK PREGNANCY IN SECOND TRIMESTER: Primary | ICD-10-CM

## 2019-02-13 DIAGNOSIS — O43.192 MARGINAL INSERTION OF UMBILICAL CORD AFFECTING MANAGEMENT OF MOTHER IN SECOND TRIMESTER: ICD-10-CM

## 2019-02-13 DIAGNOSIS — R05.9 COUGH: ICD-10-CM

## 2019-02-13 PROCEDURE — 99207 ZZC PRENATAL VISIT: CPT | Performed by: OBSTETRICS & GYNECOLOGY

## 2019-02-13 PROCEDURE — 76805 OB US >/= 14 WKS SNGL FETUS: CPT | Performed by: OBSTETRICS & GYNECOLOGY

## 2019-02-13 RX ORDER — CODEINE PHOSPHATE AND GUAIFENESIN 10; 100 MG/5ML; MG/5ML
1-2 SOLUTION ORAL EVERY 4 HOURS PRN
Qty: 180 ML | Refills: 0 | Status: SHIPPED | OUTPATIENT
Start: 2019-02-13 | End: 2019-03-13

## 2019-02-13 NOTE — PROGRESS NOTES
Normal anatomy U/S today but b/c of head position the Sept. Pellucidum was not well seen nor the NF. Overall everything else was normal  Cephalic presentation, posterior placenta, long closed cervix. The umbilical cord is marginally inserted on the placenta  Given bicornuate uterus and now the marginal cord insert will plan to do monthly growth U/S for surveillance. Next time will get a look at the septum pellucidum also  Getting some flutters now.  Had a little cramping on her right side last week. Resolved fairly quickly  Given recs for peds near them in [place], discussed lotions for stretch marks, etc.  Also discussed her persistent cough. Did the tessalon perles and helped a little but still hacking all night and waking herself up. Given robitussin AC rx for that  Return 4 weeks with repeat scan.

## 2019-02-26 ENCOUNTER — TELEPHONE (OUTPATIENT)
Dept: OBGYN | Facility: CLINIC | Age: 34
End: 2019-02-26

## 2019-02-26 NOTE — TELEPHONE ENCOUNTER
22w0d, JAMES 7/2/19. Pt has been having cramping on and off for the last two hours. Rates pain at 7 (1-10). Not as bad a cycle cramping. Denies vag bleeding or LOF. FM present. Pt is trying to push fluids. Pt laid down at lunch which helped the cramping decrease. Denies UTI symptoms. Has bicornate uterus was told may have more cramping. Pt advised to push fluids. See if she could go home and rest this afternoon. Pt to call back if cramping becomes stronger or has vaginal bleeding. Routing to Dr. Ireland. Please review.

## 2019-02-26 NOTE — TELEPHONE ENCOUNTER
Patient called today and is 22 weeks pregnant. She is having some cramping and is worried. Please call patient on cell phone today.

## 2019-02-27 NOTE — TELEPHONE ENCOUNTER
Yes agree with above. She's still so early that an FFN can't be done.   If doesn't lighten up would send her to [facility] for monitoring and possible cervical length U/S

## 2019-02-27 NOTE — TELEPHONE ENCOUNTER
"Contacted the pt- She says that she feeling very good now- She is questioning if her sx could have been from her walking on the elliptical for 50 minutes the evening before.\"I have not been doing a lot of exercising.\" I told the pt that exercise is good but not to overdo when she is not used to the activity. Take it slow and easy. Very important to keep herself well hydrated. Pt verbalized understanding.  "

## 2019-03-13 ENCOUNTER — ANCILLARY PROCEDURE (OUTPATIENT)
Dept: ULTRASOUND IMAGING | Facility: CLINIC | Age: 34
End: 2019-03-13
Payer: COMMERCIAL

## 2019-03-13 ENCOUNTER — PRENATAL OFFICE VISIT (OUTPATIENT)
Dept: OBGYN | Facility: CLINIC | Age: 34
End: 2019-03-13
Payer: COMMERCIAL

## 2019-03-13 VITALS — BODY MASS INDEX: 23.71 KG/M2 | DIASTOLIC BLOOD PRESSURE: 52 MMHG | WEIGHT: 120.4 LBS | SYSTOLIC BLOOD PRESSURE: 110 MMHG

## 2019-03-13 DIAGNOSIS — O43.192 MARGINAL INSERTION OF UMBILICAL CORD AFFECTING MANAGEMENT OF MOTHER IN SECOND TRIMESTER: ICD-10-CM

## 2019-03-13 DIAGNOSIS — O34.02 UTERUS BICORNIS AFFECTING PREGNANCY IN SECOND TRIMESTER: ICD-10-CM

## 2019-03-13 DIAGNOSIS — O09.92 SUPERVISION OF HIGH RISK PREGNANCY IN SECOND TRIMESTER: Primary | ICD-10-CM

## 2019-03-13 DIAGNOSIS — Q51.3 UTERUS BICORNIS AFFECTING PREGNANCY IN SECOND TRIMESTER: ICD-10-CM

## 2019-03-13 DIAGNOSIS — O09.92 SUPERVISION OF HIGH RISK PREGNANCY IN SECOND TRIMESTER: ICD-10-CM

## 2019-03-13 PROCEDURE — 99207 ZZC PRENATAL VISIT: CPT | Performed by: OBSTETRICS & GYNECOLOGY

## 2019-03-13 PROCEDURE — 76816 OB US FOLLOW-UP PER FETUS: CPT | Performed by: OBSTETRICS & GYNECOLOGY

## 2019-03-14 NOTE — PROGRESS NOTES
Growth U/S done b/c of marginal cord insert as well as the SCP not well seen.  Growth is normal at 1-8#=49%. CIRILO is normal at 13cm, vtx  The SCP is still not perfectly identified but o/w completely normal brain images seen  Feeling a lot of FM now. No VB or LOF  A few days ago had a couple hours of cramping and tightening on and off. Called in and told to go home from work, rest and hydrate.  Once she did that it did resolve after those couple of hours  cvx is long and closed on U/S  Patient is higher risk for PTL so discussed si/sx of BH vs PTL and when she should call and what things she can try at home first to get them to resolve if just BH  Return 4 weeks with another growth and will do GCT, hgb, tdap at that appt

## 2019-04-02 DIAGNOSIS — Z36.9 ENCOUNTER FOR ANTENATAL SCREENING OF MOTHER: Primary | ICD-10-CM

## 2019-04-02 DIAGNOSIS — Z23 NEED FOR TDAP VACCINATION: ICD-10-CM

## 2019-04-06 ENCOUNTER — NURSE TRIAGE (OUTPATIENT)
Dept: NURSING | Facility: CLINIC | Age: 34
End: 2019-04-06

## 2019-04-06 NOTE — TELEPHONE ENCOUNTER
"27 wks, , OB provider: Dr Ireland @  [facility]. High risk d/t bicornate uterus.  Pt called FNA this AM and c/o sharp abdominal pain for 1 1/2 hr. See FNA note 19 11:56 AM. On-call  was paged to call pt. Pt states she spoke w/on-call and was told \"go to the ER but do not go to [facility] because you are    <35 wks\". Pt states provider advised go to [facility] ER. [place] not in network for her insurance so she stayed home. Now @3:12pm pain continues but not severe. Pt states she wants to go to hospital but wants to know where to go in network. Paged on-call Dr Jiménez @3:20pm ( page op) to call pt at [phone]. Advised c/b if no call in 20-30 min; if pain worse, new sx, call 911 or go to [facility] immediately and [facility] will see her then redirect her to appropriate hospital. Pt voiced understanding and agreement. Fabienne Epstein RN/FNA    "

## 2019-04-06 NOTE — TELEPHONE ENCOUNTER
"Patient calling. States that she has a bicornuate uterus and is 27 1/2 weeks pregnant.     Has been experiencing cramping since she finished eating breakfast approximately 90 minutes ago. The cramping is continuous. She is lying down in bed but if she rolls over or tries to get up, it becomes even more painful.    No vaginal drainage and baby is moving normally.    On-call provider paged to call and talk with patient.    Protocol and care advice reviewed  Caller states understanding of the recommended disposition    Advised to call back if further questions or concerns      Reason for Disposition    [1] MILD abdominal pain (e.g., doesn't interfere with normal activities) AND [2] constant AND [3] present > 2 hours    Additional Information    Negative: Passed out (i.e., lost consciousness, collapsed and was not responding)    Negative: Shock suspected (e.g., cold/pale/clammy skin, too weak to stand, low BP, rapid pulse)    Negative: Difficult to awaken or acting confused  (e.g., disoriented, slurred speech)    Negative: [1] SEVERE abdominal pain (e.g., excruciating) AND [2] constant AND [3] present > 1 hour    Negative: SEVERE vaginal bleeding (e.g., continuous red blood from vagina, or large blood clots)    Negative: Sounds like a life-threatening emergency to the triager    Negative: [1] Vomiting AND [2] contains red blood or black (\"coffee ground\") material  (Exception: few red streaks in vomit that only happened once)    Negative: MODERATE-SEVERE abdominal pain (e.g., interferes with normal activities, awakens from sleep)    Negative: Vaginal bleeding or spotting    Negative: [1] Baby moving less today (e.g., kick count < 5 in 1 hour or < 10 in 2 hours) AND [2] pregnant 23 or more weeks    Negative: Leakage of fluid from vagina    Negative: New hand or face swelling    Negative: Blurred vision or visual changes    Negative: [1] SEVERE headache AND [2] not relieved with acetaminophen (e.g., Tylenol)    Protocols " used: PREGNANCY - ABDOMINAL PAIN GREATER THAN 20 WEEKS EGA-ADULT-AH

## 2019-04-06 NOTE — TELEPHONE ENCOUNTER
"  Reason for Disposition    MODERATE-SEVERE abdominal pain (e.g., interferes with normal activities, awakens from sleep)    Additional Information    Negative: Passed out (i.e., lost consciousness, collapsed and was not responding)    Negative: Shock suspected (e.g., cold/pale/clammy skin, too weak to stand, low BP, rapid pulse)    Negative: Difficult to awaken or acting confused  (e.g., disoriented, slurred speech)    Negative: [1] SEVERE abdominal pain (e.g., excruciating) AND [2] constant AND [3] present > 1 hour    Negative: SEVERE vaginal bleeding (e.g., continuous red blood from vagina, or large blood clots)    Negative: Sounds like a life-threatening emergency to the triager    Negative: Followed an abdomen (stomach) injury    Negative: [1] Having contractions or other symptoms of labor AND [2] >= 37 weeks pregnant (i.e., term pregnancy)    Negative: [1] Having contractions or other symptoms of labor AND [2] < 37 weeks pregnant (i.e., )    Negative: [1] Abdominal pain AND [2] pregnant < 20 weeks    Negative: [1] Vomiting AND [2] contains red blood or black (\"coffee ground\") material  (Exception: few red streaks in vomit that only happened once)    Protocols used: PREGNANCY - ABDOMINAL PAIN GREATER THAN 20 WEEKS EGA-ADULT-    "

## 2019-04-08 ENCOUNTER — TELEPHONE (OUTPATIENT)
Dept: OBGYN | Facility: CLINIC | Age: 34
End: 2019-04-08

## 2019-04-10 ENCOUNTER — ANCILLARY PROCEDURE (OUTPATIENT)
Dept: ULTRASOUND IMAGING | Facility: CLINIC | Age: 34
End: 2019-04-10
Payer: COMMERCIAL

## 2019-04-10 ENCOUNTER — NURSE TRIAGE (OUTPATIENT)
Dept: NURSING | Facility: CLINIC | Age: 34
End: 2019-04-10

## 2019-04-10 ENCOUNTER — PRENATAL OFFICE VISIT (OUTPATIENT)
Dept: OBGYN | Facility: CLINIC | Age: 34
End: 2019-04-10
Payer: COMMERCIAL

## 2019-04-10 VITALS — BODY MASS INDEX: 24.22 KG/M2 | SYSTOLIC BLOOD PRESSURE: 98 MMHG | DIASTOLIC BLOOD PRESSURE: 60 MMHG | WEIGHT: 123 LBS

## 2019-04-10 DIAGNOSIS — Z23 NEED FOR TDAP VACCINATION: ICD-10-CM

## 2019-04-10 DIAGNOSIS — O43.193 MARGINAL INSERTION OF UMBILICAL CORD AFFECTING MANAGEMENT OF MOTHER IN THIRD TRIMESTER: ICD-10-CM

## 2019-04-10 DIAGNOSIS — O09.92 SUPERVISION OF HIGH RISK PREGNANCY IN SECOND TRIMESTER: ICD-10-CM

## 2019-04-10 DIAGNOSIS — O43.192 MARGINAL INSERTION OF UMBILICAL CORD AFFECTING MANAGEMENT OF MOTHER IN SECOND TRIMESTER: ICD-10-CM

## 2019-04-10 DIAGNOSIS — O47.03 PRETERM UTERINE CONTRACTIONS IN THIRD TRIMESTER, ANTEPARTUM: ICD-10-CM

## 2019-04-10 DIAGNOSIS — Z34.03 ENCOUNTER FOR SUPERVISION OF NORMAL FIRST PREGNANCY IN THIRD TRIMESTER: ICD-10-CM

## 2019-04-10 DIAGNOSIS — O34.03 UTERUS BICORNIS AFFECTING PREGNANCY IN THIRD TRIMESTER: ICD-10-CM

## 2019-04-10 DIAGNOSIS — Q51.3 UTERUS BICORNIS AFFECTING PREGNANCY IN THIRD TRIMESTER: ICD-10-CM

## 2019-04-10 DIAGNOSIS — O34.02 UTERUS BICORNIS AFFECTING PREGNANCY IN SECOND TRIMESTER: ICD-10-CM

## 2019-04-10 DIAGNOSIS — O09.93 SUPERVISION OF HIGH RISK PREGNANCY IN THIRD TRIMESTER: Primary | ICD-10-CM

## 2019-04-10 DIAGNOSIS — Q51.3 UTERUS BICORNIS AFFECTING PREGNANCY IN SECOND TRIMESTER: ICD-10-CM

## 2019-04-10 DIAGNOSIS — Z36.9 ENCOUNTER FOR ANTENATAL SCREENING OF MOTHER: ICD-10-CM

## 2019-04-10 PROBLEM — Z34.00 SUPERVISION OF NORMAL IUP (INTRAUTERINE PREGNANCY) IN PRIMIGRAVIDA: Status: ACTIVE | Noted: 2018-12-03

## 2019-04-10 LAB
GLUCOSE 1H P 50 G GLC PO SERPL-MCNC: 65 MG/DL (ref 60–129)
HGB BLD-MCNC: 13.7 G/DL (ref 11.7–15.7)

## 2019-04-10 PROCEDURE — 82950 GLUCOSE TEST: CPT | Performed by: OBSTETRICS & GYNECOLOGY

## 2019-04-10 PROCEDURE — 90471 IMMUNIZATION ADMIN: CPT

## 2019-04-10 PROCEDURE — 00000218 ZZHCL STATISTIC OBHBG - HEMOGLOBIN: Performed by: OBSTETRICS & GYNECOLOGY

## 2019-04-10 PROCEDURE — 36415 COLL VENOUS BLD VENIPUNCTURE: CPT | Performed by: OBSTETRICS & GYNECOLOGY

## 2019-04-10 PROCEDURE — 90715 TDAP VACCINE 7 YRS/> IM: CPT

## 2019-04-10 PROCEDURE — 99207 ZZC PRENATAL VISIT: CPT | Performed by: OBSTETRICS & GYNECOLOGY

## 2019-04-10 PROCEDURE — 76816 OB US FOLLOW-UP PER FETUS: CPT | Performed by: OBSTETRICS & GYNECOLOGY

## 2019-04-10 NOTE — PROGRESS NOTES
Syphilis is a sexually transmitted disease that can cause birth defects in the babies of untreated mothers. Every pregnant patient is tested for syphilis early in each pregnancy as part of the routine lab work. The [place] Department of [facility] has seen an increase in the rate of syphilis in [place]. The [facility] now recommends testing for syphilis 3 times during a pregnancy, the new prenatal visit, 28 weeks and when admitted for delivery. Patient declines lab testing for syphilis.

## 2019-04-10 NOTE — PROGRESS NOTES
Patient has been crampy in general. Lots of tightening and cramps but then usually just trevizo out with rest and going to sleep/hydrating.  On Saturday was getting very regular and very painful ctx from 2-5 min apart. Called on call and b/c she was early and [facility] was closed she was sent to [facility].   Was there for about 48 hours and after 14 hours on mag and nifedipine her ctx finally stopped. She got BMZ on 4/6 and 4/7.  Since going home she hasn't gone back to work and has just been doing minimal activity. Still definitely crampy but less. mon and tues were much less but then today is a bit more. Nothing like It was Saturday. Wondering about how she will go back to work.  Patient did have a neg FFN and cvx was long and closed.  Did a growth today b/c of the marginal cord and baby is 2-5#=37%. 19/16/13/19. CIRILO is normal at 10  Patient passed her GCT and got her tdap. hgb is normal  Discussed that the FFN is 99.5% for no PTL in 2 weeks and >95% for 4 weeks so despite ctx this is very reassuring. Would repeat the FFN every 2 weeks given her bicornuate uterus  Discussed BH and cramping vs true ctx and when to call and when to come in  Will have her stay home and do modified bedrest this week and then go back on Monday and see how she does. Either we can go on nifedipine for sx ctx but not PTL so she can keep working or we may have to do short days, short week, or possibly completely stop working so will need to determine that  Return 2 weeks and then 2 weeks after that with another growth

## 2019-04-11 RX ORDER — NIFEDIPINE 10 MG/1
CAPSULE ORAL
Qty: 360 CAPSULE | Refills: 3 | OUTPATIENT
Start: 2019-04-11

## 2019-04-11 RX ORDER — NIFEDIPINE 10 MG/1
10 CAPSULE ORAL EVERY 6 HOURS
COMMUNITY
End: 2019-06-12

## 2019-04-11 NOTE — TELEPHONE ENCOUNTER
"Requested Prescriptions   Pending Prescriptions Disp Refills     NIFEdipine (PROCARDIA) 10 MG capsule [Pharmacy Med Name: NIFEDIPINE 10MG CAPSULES] 360 capsule 3     Sig: TAKE 1 CAPSULE BY MOUTH EVERY 6 HOURS       Calcium Channel Blockers Protocol  Failed - 4/10/2019  9:56 PM        Failed - Medication is active on med list        Failed - No active pregnancy on record        Failed - Normal serum creatinine on file in past 12 months     No lab results found.          Passed - Blood pressure under 140/90 in past 12 months     BP Readings from Last 3 Encounters:   04/10/19 98/60   03/13/19 110/52   02/13/19 96/66                 Passed - Recent (12 mo) or future (30 days) visit within the authorizing provider's specialty     Patient had office visit in the last 12 months or has a visit in the next 30 days with authorizing provider or within the authorizing provider's specialty.  See \"Patient Info\" tab in inbasket, or \"Choose Columns\" in Meds & Orders section of the refill encounter.              Passed - Patient is age 18 or older        Passed - No positive pregnancy test in past 12 months        Last Written Prescription Date:  No record  Last Fill Quantity: ,  # refills:    Last office visit: 4/10/2019 with prescribing provider:     Future Office Visit:   Next 5 appointments (look out 90 days)    Apr 24, 2019  1:00 PM CDT  ESTABLISHED PRENATAL with Tana Ireland MD  [facility] ([facility] [place]) [address] [place] [id number]  [phone]   May 08, 2019  2:10 PM CDT  ESTABLISHED PRENATAL with Tana Ireland MD  [facility] ([facility] [place]) [address] [place] [id number]  [phone]   May 22, 2019  2:50 PM CDT  ESTABLISHED PRENATAL with Tana Ireland MD  [facility] ([facility] [place]) [address] [place] [address] " [phone]  [phone]   Jun 07, 2019  2:50 PM CDT  ESTABLISHED PRENATAL with Tana Ireland MD  [facility] ([facility]) [address] [place] [id number]  [phone]         Pharmacy request for 90 day supply sent via \"rx refill request\"  No record of this medication in pt Epic chart.  Called pharmacy to inquire  They received a verbal/phone order 4/10/19 on behalf of Dr Ireland, no name documented on who called it in but was received a long with the hydroxyzine Rx (no record in Epic).  Pt already picked up Rx's  Will disregard the supply request at this time, pharmacist aware.    "

## 2019-04-11 NOTE — TELEPHONE ENCOUNTER
"Annie and  calling:  \"She was seen at clinic today and had a recent trip to the ER for some cramping/contractions\".  Caller is reporting contractions tonight ranging from every 5 to 10 minutes apart. Denies any bleeding or leaking of fluid.  Unable to get comfortable.    Paged on call provider for [facility] to speak to caller at [phone].  Dr. Ireland is on call, page sent @ 8:50 pm via smart web.    Caller advised to call back if they have not heard back from on call provider within 20 minutes.  Caller appears to understand directives and agrees with plan.    Lina Thompson RN  [facility]      "

## 2019-04-11 NOTE — TELEPHONE ENCOUNTER
Dr Ireland confirmed Hydroxyzine and Nifedipine were called in to pharmacy last evening by her after speaking with pt while on call. Medications added to pt med list. Unsure of hydroxyzine dose at this time.

## 2019-04-18 ENCOUNTER — TELEPHONE (OUTPATIENT)
Dept: OBGYN | Facility: CLINIC | Age: 34
End: 2019-04-18

## 2019-04-18 ENCOUNTER — HOSPITAL ENCOUNTER (OUTPATIENT)
Facility: CLINIC | Age: 34
Discharge: HOME | End: 2019-04-18
Attending: OBSTETRICS & GYNECOLOGY | Admitting: OBSTETRICS & GYNECOLOGY
Payer: COMMERCIAL

## 2019-04-18 ENCOUNTER — DOCUMENTATION ONLY (OUTPATIENT)
Dept: OBGYN | Facility: CLINIC | Age: 34
End: 2019-04-18

## 2019-04-18 VITALS
DIASTOLIC BLOOD PRESSURE: 64 MMHG | SYSTOLIC BLOOD PRESSURE: 101 MMHG | RESPIRATION RATE: 16 BRPM | BODY MASS INDEX: 24.15 KG/M2 | TEMPERATURE: 97.9 F | WEIGHT: 123 LBS | HEIGHT: 60 IN

## 2019-04-18 PROBLEM — O60.00 PRETERM LABOR: Status: ACTIVE | Noted: 2019-04-18

## 2019-04-18 LAB
ALBUMIN UR-MCNC: NEGATIVE MG/DL
APPEARANCE UR: CLEAR
BACTERIA #/AREA URNS HPF: ABNORMAL /HPF
BILIRUB UR QL STRIP: NEGATIVE
COLOR UR AUTO: ABNORMAL
GLUCOSE UR STRIP-MCNC: NEGATIVE MG/DL
HGB UR QL STRIP: NEGATIVE
KETONES UR STRIP-MCNC: NEGATIVE MG/DL
LEUKOCYTE ESTERASE UR QL STRIP: NEGATIVE
NITRATE UR QL: NEGATIVE
PH UR STRIP: 7 PH (ref 5–7)
RBC #/AREA URNS AUTO: 1 /HPF (ref 0–2)
SOURCE: ABNORMAL
SP GR UR STRIP: 1 (ref 1–1.03)
SQUAMOUS #/AREA URNS AUTO: <1 /HPF (ref 0–1)
UROBILINOGEN UR STRIP-MCNC: NORMAL MG/DL (ref 0–2)
WBC #/AREA URNS AUTO: <1 /HPF (ref 0–5)

## 2019-04-18 PROCEDURE — 59025 FETAL NON-STRESS TEST: CPT

## 2019-04-18 PROCEDURE — 81001 URINALYSIS AUTO W/SCOPE: CPT | Performed by: OBSTETRICS & GYNECOLOGY

## 2019-04-18 PROCEDURE — G0463 HOSPITAL OUTPT CLINIC VISIT: HCPCS | Mod: 25

## 2019-04-18 RX ORDER — ONDANSETRON 2 MG/ML
4 INJECTION INTRAMUSCULAR; INTRAVENOUS EVERY 6 HOURS PRN
Status: DISCONTINUED | OUTPATIENT
Start: 2019-04-18 | End: 2019-04-18 | Stop reason: HOSPADM

## 2019-04-18 RX ORDER — CALCIUM CARBONATE 500 MG/1
2 TABLET, CHEWABLE ORAL 2 TIMES DAILY
COMMUNITY
End: 2019-06-12

## 2019-04-18 ASSESSMENT — MIFFLIN-ST. JEOR: SCORE: 1189.42

## 2019-04-18 NOTE — PROGRESS NOTES
Pt seen in MAC this am after cramping/contractions. Resolved by time of MAC visit, normal evaluation, no ctx on monitor. Last neg FFN less than 2 weeks ago. Pt takes oral nifedipine as needed, last this am around 0630.     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 Will have her stay home from work rest of today and tomorrow. Start nifedipine q 6hrs, monitor for SE.    Will route to  to see about modifying her work schedule starting next week. Has an appointment Wednesday 4/24/19.    Porsche Camp Masters, DO

## 2019-04-18 NOTE — DISCHARGE INSTRUCTIONS
Discharge Instruction for Undelivered Patients      You were seen for: Labor Assessment  We Consulted:  Dr. Maya  You had (Test or Medicine): Urine analysis, fetal and uterine monitoring.     Diet:   Drink 8 to 12 glasses of liquids (milk, juice, water) every day.  You may eat meals and snacks.     Activity:  Modified bedrest  No work today or tomorrow till further notice     Call your provider if you notice:  Swelling in your face or increased swelling in your hands or legs.  Headaches that are not relieved by Tylenol (acetaminophen).  Changes in your vision (blurring: seeing spots or stars.)  Nausea (sick to your stomach) and vomiting (throwing up).   Weight gain of 5 pounds or more per week.  Heartburn that doesn't go away.  Signs of bladder infection: pain when you urinate (use the toilet), need to go more often and more urgently.  The bag of kingsley (rupture of membranes) breaks, or you notice leaking in your underwear.  Bright red blood in your underwear.  Abdominal (lower belly) or stomach pain.  For first baby: Contractions (tightening) less than 5 minutes apart for one hour or more.  Second (plus) baby: Contractions (tightening) less than 10 minutes apart and getting stronger.  *If less than 34 weeks: Contractions (tightenings) more than 6 times in one hour.  Increase or change in vaginal discharge (note the color and amount)  Other:  Your urine analysis is normal.  No uterine contractions noted, just the baby stretching, NST is reactive indicating fetal well being.  No working today or tomorrow. Dr. Ireland will contact you regarding a plan for next week.  Dr. Maya wants you to take Nifedipine 10 mg every 6 hours.  If you become dizzy or lightheaded, call your physician to determine if you need to hold the next dose of Nifedipine.    Follow-up:  As scheduled in the clinic

## 2019-04-18 NOTE — TELEPHONE ENCOUNTER
Pt calling at 29w2d  Bicornuate uterus  Started on Nifedipine 4/7/18 after visit at [facility]  FFN negative and BMZ 4/6&4/7 at that time  OV with Dr Ireland 4/10/19    Contraction pain continues, more painful: cramping, movement stimulates  Stayed home from work  Every 5-10 min  Denies LOF or VB  Reporting active FM  0640: Nifedipine 10 mg (order is every 6 hours prn)  Asking if she can take an additional Nifedipine.    Consulted Dr Maya with above information. Orders received to send pt into [facility] to be evaluated.    Informed pt of Dr Maya instructions. Pt verbalized understanding, in agreement with plan, and voiced no further questions.    Called and informed MAC pt coming, Dr Maya on call.

## 2019-04-18 NOTE — PLAN OF CARE
Patient is discharged to home on modified bedrest.  The patient is not to work today and tomorrow.  The patient is to start taking her Nifedipine 10 mg every 6 hours vs just when needed.  Dr. Ireland will contact the patient at home with the plan of care next week.

## 2019-04-18 NOTE — PLAN OF CARE
The patient arrives to the [facility] to rule out PTL.  The patient started having cramping last evening at 2000.  The cramping intensity increased at 2200.  The patient took a dose of Nifedipine and Vistaril before bedtime.  The patient had relief one hour after the Nifedipine but the cramping restarted this morning and it was every 5-10 minutes and then subsided for 45 minutes and then restarted.  The patient stated she was seen at [facility] on April 6th.  The patient had fetal fibernectin on 04/06/19.  A urine is obtained and sent.  The patient has a bicornuate uterus.  The patient denies any vaginal discharge or bleeding.

## 2019-04-22 ENCOUNTER — TELEPHONE (OUTPATIENT)
Dept: OBGYN | Facility: CLINIC | Age: 34
End: 2019-04-22

## 2019-04-22 NOTE — TELEPHONE ENCOUNTER
Left detailed message on pt's voicemail. Included call back number regarding moving appointment up.

## 2019-04-22 NOTE — TELEPHONE ENCOUNTER
29w6d, JAMES 7/2/19. Pt was seen at [facility] on 4/18/19. Was started on Nifedipine 10 mg q 6 hrs. Pt is scheduled for prenatal visit wed. Wants to know if she should have an US prior to the visit. Last US 4/10/19. Routing to Dr. Ireland. Do you want another US??    4/10/19 Growth is appropriate for gestational age.  EFW by today's ultrasound is 1056grams, which is the 37%tile.  Fundal placenta with known marginal cord insertion  Normal CIRILO, vertex presentation.

## 2019-04-22 NOTE — TELEPHONE ENCOUNTER
I almost always put in  My prenatal when they're due. So no, not this time, next appointment in 2 weeks.  However just sent Maggy a note saying my 230 and 3pm today are cancelled and chely had watned to move up so if she wants to take a spot in that window today she can or keep the 1pm on wed is fine too

## 2019-04-24 ENCOUNTER — PRENATAL OFFICE VISIT (OUTPATIENT)
Dept: OBGYN | Facility: CLINIC | Age: 34
End: 2019-04-24
Payer: COMMERCIAL

## 2019-04-24 VITALS — DIASTOLIC BLOOD PRESSURE: 60 MMHG | WEIGHT: 124.4 LBS | SYSTOLIC BLOOD PRESSURE: 94 MMHG | BODY MASS INDEX: 24.3 KG/M2

## 2019-04-24 DIAGNOSIS — Q51.3 UTERUS BICORNIS AFFECTING PREGNANCY IN THIRD TRIMESTER: ICD-10-CM

## 2019-04-24 DIAGNOSIS — O47.03 PRETERM UTERINE CONTRACTIONS IN THIRD TRIMESTER, ANTEPARTUM: ICD-10-CM

## 2019-04-24 DIAGNOSIS — O43.193 MARGINAL INSERTION OF UMBILICAL CORD AFFECTING MANAGEMENT OF MOTHER IN THIRD TRIMESTER: ICD-10-CM

## 2019-04-24 DIAGNOSIS — O09.93 SUPERVISION OF HIGH RISK PREGNANCY IN THIRD TRIMESTER: Primary | ICD-10-CM

## 2019-04-24 DIAGNOSIS — O34.03 UTERUS BICORNIS AFFECTING PREGNANCY IN THIRD TRIMESTER: ICD-10-CM

## 2019-04-24 LAB — FIBRONECTIN FETAL VAG QL: NEGATIVE

## 2019-04-24 PROCEDURE — 99207 ZZC PRENATAL VISIT: CPT | Performed by: OBSTETRICS & GYNECOLOGY

## 2019-04-24 PROCEDURE — 82731 ASSAY OF FETAL FIBRONECTIN: CPT | Performed by: OBSTETRICS & GYNECOLOGY

## 2019-04-24 NOTE — LETTER
To whom it may concern,    I am writing this letter on behalf of Jaya Clark. Jaya's wife Annie Clark had a baby prematurely on 4/30/19. This is to verify date of birth for purposes of beginning his paternity leave.        Sincerely,        Tana Ireland MD

## 2019-04-25 ENCOUNTER — NURSE TRIAGE (OUTPATIENT)
Dept: NURSING | Facility: CLINIC | Age: 34
End: 2019-04-25

## 2019-04-25 ENCOUNTER — HOSPITAL ENCOUNTER (INPATIENT)
Facility: CLINIC | Age: 34
LOS: 2 days | Discharge: HOME | DRG: 833 | End: 2019-04-28
Attending: OBSTETRICS & GYNECOLOGY | Admitting: OBSTETRICS & GYNECOLOGY
Payer: COMMERCIAL

## 2019-04-25 ENCOUNTER — TELEPHONE (OUTPATIENT)
Dept: OBGYN | Facility: CLINIC | Age: 34
End: 2019-04-25

## 2019-04-25 DIAGNOSIS — L23.89 ALLERGIC CONTACT DERMATITIS DUE TO OTHER AGENTS: Primary | ICD-10-CM

## 2019-04-25 PROBLEM — O26.90 PREGNANCY RELATED CONDITION: Status: ACTIVE | Noted: 2019-04-25

## 2019-04-25 PROBLEM — O47.00 PRETERM CONTRACTIONS: Status: ACTIVE | Noted: 2019-04-25

## 2019-04-25 PROCEDURE — G0463 HOSPITAL OUTPT CLINIC VISIT: HCPCS | Mod: 25

## 2019-04-25 PROCEDURE — 59025 FETAL NON-STRESS TEST: CPT

## 2019-04-25 RX ORDER — ONDANSETRON 2 MG/ML
4 INJECTION INTRAMUSCULAR; INTRAVENOUS EVERY 6 HOURS PRN
Status: DISCONTINUED | OUTPATIENT
Start: 2019-04-25 | End: 2019-04-28 | Stop reason: HOSPADM

## 2019-04-25 RX ORDER — ACETAMINOPHEN 500 MG
500 TABLET ORAL EVERY 6 HOURS PRN
COMMUNITY
End: 2019-06-12

## 2019-04-25 ASSESSMENT — MIFFLIN-ST. JEOR: SCORE: 1193.96

## 2019-04-25 NOTE — PROGRESS NOTES
Patient had a lot of ctx last week. I spoke to her by phone the night of our last visit, 4/10 and she was lisset every 5 min.  I called in nifedipine 10mg q6 hrs for her as well as vistaril for sleep. Patient misunderstood and was only taking the nifedipine when ctx would start rather than scheduled.  She went back to MAC 4/18. cvx was still closed. Was intructed to do the nifedipine every 6 hours and has done that since. Has had no ctx basically at all since that night.  Has been able to work and managing.   Staying hydrated.  Tolerating the nifedipine well. BP is on low side but always was. No dizziness  Discussed PTL vs PTCtx. At this point the nifedipine is more for sx control than PTL prevention. Has had BMZ already but if gets to 33-34 weeks would do one rescue dose  FFN repeated today  cvx is closed and 30%. Fetal station is fairly low  FH is normal though looks small. Growth U/S next time in 2 weeks d/t cord insertion  Good FM  No VB or LOF  Discussed that if breaks through and has more ctx but FFNs remain neg we can either bump to 20mg q6 hrs if her BP can tolerate it and/or we can add magnesium 400mg BID for sx control as well

## 2019-04-25 NOTE — TELEPHONE ENCOUNTER
The concern is for a drop in her blood pressure. If she has a way to check her blood pressure then she can double the dose as long as her blood pressure is above 90/50 mm Hg. If her contractions are breaking through her nifedipine then I would recommend coming back to the hospital for evaluation. If she does not have a way to check her blood pressure I would recommend a visit with us or to [facility].

## 2019-04-25 NOTE — TELEPHONE ENCOUNTER
"I just checked her yesterday and did an FFN so it probably stirred up more uterine activity is all.  Checking her BP is fine but also if she's feeling dizzy when she stands or really fatigued that is also a sign of low BP so if she isn't feeling that way it's a good test.  She can certainly bump to the 20mg but I don't know that she has to quite yet. She may just need to lay low and let our exam from yesterday \"wash out\"  Make sure hydrating a lot as well  I don't know that she needs to go to MAC anytime in the next day or two unless pain is unbearable. Neg Ffn and closed cervix just yesterday  "

## 2019-04-25 NOTE — TELEPHONE ENCOUNTER
Called pt with Dr Carmona's response below.  Pt is having her mother go get a BP cuff she can check her BP's at home. She would like to hold off on evaluation in clinic or hospital    She will inform what her BP is and will increase Nifedipine to 20 mg every 6 hrs if BP 90/50 and above.    She will again report back with her BP

## 2019-04-25 NOTE — TELEPHONE ENCOUNTER
Prenatal visit with Shu 4/24/19:  Discussed that if breaks through and has more ctx but FFNs remain neg we can either bump to 20mg q6 hrs if her BP can tolerate it and/or we can add magnesium 400mg BID for sx control as well    Pt reporting that cx's are bothersome today, not as painful as last week when evaluated in MAC  Taking the Nifedipine 10 mg every 6 hours for cx pain  Sleeping pill last night, and slept well  Awoke and took her Nifedipine at 0630 along with Tylenol  0800 cramping pain persists and feels ok if she is laying still in bed but picks up and more uncomfortable when moves and up to the BR or around the house.  Denies HA's, dizziness  Does not take her BP at home  OV BP: 94/60, does normally have a lower BP.  Next Nifedipine dose due at 1230    Pt is inquiring if she should increase Nifedipine to the 20 mg every 6 hrs as Shu mentioned yesterday in clinic.    Shu out of the office today. Dr Carmona on call provider. Routing to ME to advise.

## 2019-04-25 NOTE — TELEPHONE ENCOUNTER
Pt states she had a BM today feels better  Still some cramping-discussed Dr. Ireland recommendations as well as staying well hydrated, watching her activity level, emptying her bladder regularly. BP check at home 101/69  Will continue the 10 mg dose at this time. Will call if her cramping worsens  Crystal De Guzman RN on 4/25/2019 at 1:36 PM

## 2019-04-26 LAB
ABO + RH BLD: NORMAL
ABO + RH BLD: NORMAL
ALBUMIN UR-MCNC: NEGATIVE MG/DL
APPEARANCE UR: ABNORMAL
BASOPHILS # BLD AUTO: 0 10E9/L (ref 0–0.2)
BASOPHILS NFR BLD AUTO: 0.3 %
BILIRUB UR QL STRIP: NEGATIVE
BLD GP AB SCN SERPL QL: NORMAL
BLOOD BANK CMNT PATIENT-IMP: NORMAL
C TRACH DNA SPEC QL NAA+PROBE: NEGATIVE
COLOR UR AUTO: YELLOW
DIFFERENTIAL METHOD BLD: ABNORMAL
EOSINOPHIL # BLD AUTO: 0.1 10E9/L (ref 0–0.7)
EOSINOPHIL NFR BLD AUTO: 0.4 %
ERYTHROCYTE [DISTWIDTH] IN BLOOD BY AUTOMATED COUNT: 12.1 % (ref 10–15)
GLUCOSE UR STRIP-MCNC: NEGATIVE MG/DL
HCT VFR BLD AUTO: 41.5 % (ref 35–47)
HGB BLD-MCNC: 14.9 G/DL (ref 11.7–15.7)
HGB UR QL STRIP: NEGATIVE
HYALINE CASTS #/AREA URNS LPF: 8 /LPF (ref 0–2)
IMM GRANULOCYTES # BLD: 0.1 10E9/L (ref 0–0.4)
IMM GRANULOCYTES NFR BLD: 0.6 %
KETONES UR STRIP-MCNC: 10 MG/DL
LEUKOCYTE ESTERASE UR QL STRIP: NEGATIVE
LYMPHOCYTES # BLD AUTO: 2.2 10E9/L (ref 0.8–5.3)
LYMPHOCYTES NFR BLD AUTO: 15.8 %
MCH RBC QN AUTO: 32.6 PG (ref 26.5–33)
MCHC RBC AUTO-ENTMCNC: 35.9 G/DL (ref 31.5–36.5)
MCV RBC AUTO: 91 FL (ref 78–100)
MONOCYTES # BLD AUTO: 0.9 10E9/L (ref 0–1.3)
MONOCYTES NFR BLD AUTO: 6.3 %
MUCOUS THREADS #/AREA URNS LPF: PRESENT /LPF
N GONORRHOEA DNA SPEC QL NAA+PROBE: NEGATIVE
NEUTROPHILS # BLD AUTO: 10.8 10E9/L (ref 1.6–8.3)
NEUTROPHILS NFR BLD AUTO: 76.6 %
NITRATE UR QL: NEGATIVE
NRBC # BLD AUTO: 0 10*3/UL
NRBC BLD AUTO-RTO: 0 /100
PH UR STRIP: 6.5 PH (ref 5–7)
PLATELET # BLD AUTO: 197 10E9/L (ref 150–450)
RBC # BLD AUTO: 4.57 10E12/L (ref 3.8–5.2)
RBC #/AREA URNS AUTO: 2 /HPF (ref 0–2)
SOURCE: ABNORMAL
SP GR UR STRIP: 1.01 (ref 1–1.03)
SPECIMEN EXP DATE BLD: NORMAL
SPECIMEN SOURCE: NORMAL
SPECIMEN SOURCE: NORMAL
SQUAMOUS #/AREA URNS AUTO: <1 /HPF (ref 0–1)
UROBILINOGEN UR STRIP-MCNC: NORMAL MG/DL (ref 0–2)
WBC # BLD AUTO: 14.1 10E9/L (ref 4–11)
WBC #/AREA URNS AUTO: 4 /HPF (ref 0–5)

## 2019-04-26 PROCEDURE — 87491 CHLMYD TRACH DNA AMP PROBE: CPT | Performed by: OBSTETRICS & GYNECOLOGY

## 2019-04-26 PROCEDURE — 25800030 ZZH RX IP 258 OP 636: Performed by: OBSTETRICS & GYNECOLOGY

## 2019-04-26 PROCEDURE — 86901 BLOOD TYPING SEROLOGIC RH(D): CPT | Performed by: OBSTETRICS & GYNECOLOGY

## 2019-04-26 PROCEDURE — 86850 RBC ANTIBODY SCREEN: CPT | Performed by: OBSTETRICS & GYNECOLOGY

## 2019-04-26 PROCEDURE — 87086 URINE CULTURE/COLONY COUNT: CPT | Performed by: OBSTETRICS & GYNECOLOGY

## 2019-04-26 PROCEDURE — 99231 SBSQ HOSP IP/OBS SF/LOW 25: CPT | Performed by: OBSTETRICS & GYNECOLOGY

## 2019-04-26 PROCEDURE — 85025 COMPLETE CBC W/AUTO DIFF WBC: CPT | Performed by: OBSTETRICS & GYNECOLOGY

## 2019-04-26 PROCEDURE — 81001 URINALYSIS AUTO W/SCOPE: CPT | Performed by: OBSTETRICS & GYNECOLOGY

## 2019-04-26 PROCEDURE — 99221 1ST HOSP IP/OBS SF/LOW 40: CPT | Performed by: OBSTETRICS & GYNECOLOGY

## 2019-04-26 PROCEDURE — 36415 COLL VENOUS BLD VENIPUNCTURE: CPT | Performed by: OBSTETRICS & GYNECOLOGY

## 2019-04-26 PROCEDURE — 25000128 H RX IP 250 OP 636: Performed by: OBSTETRICS & GYNECOLOGY

## 2019-04-26 PROCEDURE — 86900 BLOOD TYPING SEROLOGIC ABO: CPT | Performed by: OBSTETRICS & GYNECOLOGY

## 2019-04-26 PROCEDURE — 25000132 ZZH RX MED GY IP 250 OP 250 PS 637: Performed by: OBSTETRICS & GYNECOLOGY

## 2019-04-26 PROCEDURE — 12000000 ZZH R&B MED SURG/OB

## 2019-04-26 PROCEDURE — 87591 N.GONORRHOEAE DNA AMP PROB: CPT | Performed by: OBSTETRICS & GYNECOLOGY

## 2019-04-26 RX ORDER — PENICILLIN G POTASSIUM 5000000 [IU]/1
5 INJECTION, POWDER, FOR SOLUTION INTRAMUSCULAR; INTRAVENOUS ONCE
Status: DISCONTINUED | OUTPATIENT
Start: 2019-04-26 | End: 2019-04-26

## 2019-04-26 RX ORDER — MAGNESIUM SULFATE IN WATER 40 MG/ML
2 INJECTION, SOLUTION INTRAVENOUS CONTINUOUS
Status: DISCONTINUED | OUTPATIENT
Start: 2019-04-26 | End: 2019-04-27

## 2019-04-26 RX ORDER — ONDANSETRON 2 MG/ML
4 INJECTION INTRAMUSCULAR; INTRAVENOUS EVERY 6 HOURS PRN
Status: DISCONTINUED | OUTPATIENT
Start: 2019-04-25 | End: 2019-04-28 | Stop reason: HOSPADM

## 2019-04-26 RX ORDER — POLYETHYLENE GLYCOL 3350 17 G/17G
17 POWDER, FOR SOLUTION ORAL DAILY
Status: DISCONTINUED | OUTPATIENT
Start: 2019-04-26 | End: 2019-04-28 | Stop reason: HOSPADM

## 2019-04-26 RX ORDER — DOCUSATE SODIUM 100 MG/1
100 CAPSULE, LIQUID FILLED ORAL 2 TIMES DAILY
Status: DISCONTINUED | OUTPATIENT
Start: 2019-04-26 | End: 2019-04-27

## 2019-04-26 RX ORDER — CALCIUM GLUCONATE 94 MG/ML
1 INJECTION, SOLUTION INTRAVENOUS
Status: DISCONTINUED | OUTPATIENT
Start: 2019-04-26 | End: 2019-04-28 | Stop reason: HOSPADM

## 2019-04-26 RX ORDER — ACETAMINOPHEN 325 MG/1
975 TABLET ORAL EVERY 4 HOURS PRN
Status: DISCONTINUED | OUTPATIENT
Start: 2019-04-26 | End: 2019-04-28 | Stop reason: HOSPADM

## 2019-04-26 RX ORDER — PRENATAL VIT/IRON FUM/FOLIC AC 27MG-0.8MG
1 TABLET ORAL DAILY
Status: DISCONTINUED | OUTPATIENT
Start: 2019-04-26 | End: 2019-04-28 | Stop reason: HOSPADM

## 2019-04-26 RX ORDER — CALCIUM CARBONATE 500 MG/1
1000 TABLET, CHEWABLE ORAL 2 TIMES DAILY PRN
Status: DISCONTINUED | OUTPATIENT
Start: 2019-04-26 | End: 2019-04-28 | Stop reason: HOSPADM

## 2019-04-26 RX ORDER — HYDROXYZINE HYDROCHLORIDE 50 MG/1
50 TABLET, FILM COATED ORAL AT BEDTIME
Status: DISCONTINUED | OUTPATIENT
Start: 2019-04-26 | End: 2019-04-27

## 2019-04-26 RX ORDER — MAGNESIUM SULFATE HEPTAHYDRATE 40 MG/ML
4 INJECTION, SOLUTION INTRAVENOUS ONCE
Status: COMPLETED | OUTPATIENT
Start: 2019-04-26 | End: 2019-04-26

## 2019-04-26 RX ORDER — CALCIUM CARBONATE 500 MG/1
1000 TABLET, CHEWABLE ORAL 2 TIMES DAILY
Status: DISCONTINUED | OUTPATIENT
Start: 2019-04-26 | End: 2019-04-26

## 2019-04-26 RX ORDER — SIMETHICONE 80 MG
160 TABLET,CHEWABLE ORAL EVERY 4 HOURS PRN
Status: DISCONTINUED | OUTPATIENT
Start: 2019-04-26 | End: 2019-04-28 | Stop reason: HOSPADM

## 2019-04-26 RX ORDER — SODIUM CHLORIDE, SODIUM LACTATE, POTASSIUM CHLORIDE, CALCIUM CHLORIDE 600; 310; 30; 20 MG/100ML; MG/100ML; MG/100ML; MG/100ML
INJECTION, SOLUTION INTRAVENOUS CONTINUOUS
Status: DISCONTINUED | OUTPATIENT
Start: 2019-04-26 | End: 2019-04-28 | Stop reason: HOSPADM

## 2019-04-26 RX ADMIN — DOCUSATE SODIUM 100 MG: 100 CAPSULE, LIQUID FILLED ORAL at 21:02

## 2019-04-26 RX ADMIN — HYDROXYZINE HYDROCHLORIDE 50 MG: 50 TABLET, FILM COATED ORAL at 21:02

## 2019-04-26 RX ADMIN — MAGNESIUM SULFATE HEPTAHYDRATE 2 G/HR: 40 INJECTION, SOLUTION INTRAVENOUS at 21:10

## 2019-04-26 RX ADMIN — DOCUSATE SODIUM 100 MG: 100 CAPSULE, LIQUID FILLED ORAL at 09:02

## 2019-04-26 RX ADMIN — SODIUM CHLORIDE, POTASSIUM CHLORIDE, SODIUM LACTATE AND CALCIUM CHLORIDE: 600; 310; 30; 20 INJECTION, SOLUTION INTRAVENOUS at 18:42

## 2019-04-26 RX ADMIN — PRENATAL VIT W/ FE FUMARATE-FA TAB 27-0.8 MG 1 TABLET: 27-0.8 TAB at 09:02

## 2019-04-26 RX ADMIN — POLYETHYLENE GLYCOL 3350 17 G: 17 POWDER, FOR SOLUTION ORAL at 09:01

## 2019-04-26 RX ADMIN — SODIUM CHLORIDE, POTASSIUM CHLORIDE, SODIUM LACTATE AND CALCIUM CHLORIDE: 600; 310; 30; 20 INJECTION, SOLUTION INTRAVENOUS at 07:44

## 2019-04-26 RX ADMIN — MAGNESIUM SULFATE HEPTAHYDRATE 2 G/HR: 40 INJECTION, SOLUTION INTRAVENOUS at 01:23

## 2019-04-26 RX ADMIN — ONDANSETRON 4 MG: 2 INJECTION INTRAMUSCULAR; INTRAVENOUS at 00:41

## 2019-04-26 RX ADMIN — MAGNESIUM SULFATE IN WATER 4 G: 40 INJECTION, SOLUTION INTRAVENOUS at 00:48

## 2019-04-26 RX ADMIN — SODIUM CHLORIDE, POTASSIUM CHLORIDE, SODIUM LACTATE AND CALCIUM CHLORIDE: 600; 310; 30; 20 INJECTION, SOLUTION INTRAVENOUS at 00:44

## 2019-04-26 RX ADMIN — MAGNESIUM SULFATE HEPTAHYDRATE 2 G/HR: 40 INJECTION, SOLUTION INTRAVENOUS at 11:21

## 2019-04-26 NOTE — H&P
[facility]    History and Physical  Obstetrics and Gynecology     Date of Admission:  2019    Assessment & Plan   Annie Clark is a 33 year old female G1 at 30+3wga with a bicornuate uterus with  contractions.     ASSESSMENT:   IUP @ 30w3d   contractions: will hold nifedipine and give magnesium. Will add on indomethacin if needed.  Betamethasone was given -. As cervix is closed, I will defer giving a rescue course at this time.      PLAN:   IV Magensium for tocolysis and neuroprotection, IV Fluids  No concern for imminent delivery  Send PTL labs  Symptomatic treatment for nausea and etc.      Kristi Carmona MD    History of Present Illness   Annie Clark is a 33 year old female  30w3d  Estimated Date of Delivery: 2019 is calculated from Patient's last menstrual period was 2018. [facility]  contractions not well controlled on oral nifedipine at home. Her pregnancy is significant for a bicornuate uterus with the fetus in the left side and a marginal cord insertion. She has been previously admitted at [facility] for  contractions.    PRENATAL COURSE  Prenatal course was complicated by as noted above in the HPI.      Recent Labs   Lab Test 18  1454   ABO B   RH Pos   AS Neg     Rhogam not indicated   Recent Labs   Lab Test 18  1454   HEPBANG Nonreactive   HIAGAB Nonreactive   RUQIGG 53         Prior to Admission Medications   Prior to Admission Medications   Prescriptions Last Dose Informant Patient Reported? Taking?   HYDROXYZINE HCL PO 2019 at Unknown time Self Yes Yes   NIFEdipine (PROCARDIA) 10 MG capsule 2019 at 1830 Self Yes Yes   Sig: Take 10 mg by mouth every 6 hours   Prenatal Vit-Fe Fumarate-FA (PRENATAL VITAMIN PO) 2019 at Unknown time Self Yes Yes   acetaminophen (TYLENOL) 500 MG tablet 2019 at 1200 Self Yes Yes   Sig: Take 500 mg by mouth every 6 hours as needed for  mild pain   calcium carbonate (TUMS) 500 MG chewable tablet Past Week at Unknown time Self Yes Yes   Sig: Take 2 chew tab by mouth 2 times daily      Facility-Administered Medications: None     Allergies   Allergies   Allergen Reactions     Nuts Anaphylaxis and Nausea and Vomiting     Food      PN: peas     Thimerosal          Immunization History   Immunization History   Administered Date(s) Administered     Influenza Vaccine IM 3yrs+ 4 Valent IIV4 12/03/2018     TDAP Vaccine (Adacel) 04/10/2019       Past Medical History:   Diagnosis Date     Monoallelic mutation of BRIP1 gene      Urinary urgency 10/30/2015       Past Surgical History:   Procedure Laterality Date     C TRANSNASAL EUSTACH TUBE INFLATE,CATH       IP DIET ADULT PUREED       PARTIAL HYMENECTOMY/REVISION HYMENAL RING  3/5/08    Done by [facility]     TONSILLECTOMY         Vitals:    04/25/19 2341   BP: 124/68   Pulse: 78   Resp: 16   Temp: 98  F (36.7  C)   TempSrc: Temporal   Weight: 56.2 kg (124 lb)   Height: 1.524 m (5')       Abdomen: gravid, fundal tenderness is present  FHT: reactive NST appropriate for gestational age.   [place]: q 5mins  SVE: contracture precluding exam  SSE: cervix visualized to be closed and long. Fetal station by palpation is 0 to +1  Constitutional: healthy, alert, active and no distress   Extremities: NT, no edema  Neurologic: Awake, alert, oriented x3  Neuropsychiatric: General: normal, calm and normal eye contact  Heart: well perfused extremities  Lungs: non-labored respirations    Kristi Carmona MD

## 2019-04-26 NOTE — PLAN OF CARE
Pt unable to tolerate vaginal exam, Dr. LAILA Carmona visualizes cervix with sterile speculum; closed cervix.

## 2019-04-26 NOTE — PLAN OF CARE
Pt admitted to [facility] for contractions.  Arrives in wheelchair with mother.  Reports is on nifidipine 10mg for  labor.  Has had contractions throughout the day, became worse around 7pm.  Has been in the bath with some relief several times today.  Negative FFN yesterday in the clinic.

## 2019-04-26 NOTE — PLAN OF CARE
Pt admitted for magnesium infusion for tocolysis.  Magnesium infusing at this time.  To receive penicillin for prophylaxis due to unknown GBS status.  Pt presents with nausea, given zofran 4mg IV.  Mother of patient present,  being updated as needed.

## 2019-04-26 NOTE — TELEPHONE ENCOUNTER
Annie is calling for the 3rd time today with worsening contraction pains and frequency, around 10 - 15 min apart.  She threw up 5 times in the last few hours. She was asking about if they were going to increase her Nifedipine or should she just take her sleeping pill and go to bed. Page to Dr Carmona @2208, with call back at 2210 to tell me to have patient come in and be evaluated.  I called Annie back and passed on that information.    Yolette Chavez RN/ [facility]      Reason for Disposition    [1] Contractions > 10 minutes apart AND [2] persist > 24 hours AND [3] no improvement using Care Advice    Additional Information    Negative: MODERATE-SEVERE abdominal pain    Negative: Contractions < 10 minutes apart for 1 hour (i.e., 6 or more contractions an hour)    Negative: [1] Contractions AND [2] any vaginal bleeding (including: red blood, clots, spotting, or pink/brown mucous)    Negative: Vaginal bleeding    (Exception: spotting after intercourse or pelvic exam, or slight pinkish or brownish mucous discharge)    Negative: Leakage of fluid from vagina    Negative: [1] Baby moving less today (e.g., kick count < 5 in 1 hour or < 10 in 2 hours) AND [2] pregnant 23 or more weeks    Negative: No movement of baby for 8 hours    Negative: New hand or face swelling    Negative: Fever > 100.4 F (38.0 C)    Negative: Increased pressure in pelvic area    Negative: [1] Lower back discomfort AND [2] not relieved by rest    Negative: Has a cerclage (i.e., a procedure where the obstetrician stitches shut the cervix)    Negative: Pinkish or brownish mucous discharge  (Exception: spotting after intercourse or pelvic exam)    Negative: Patient sounds very sick or weak to the triager    Negative: Passed out (i.e., lost consciousness, collapsed and was not responding)    Negative: Shock suspected (e.g., cold/pale/clammy skin, too weak to stand, low BP, rapid pulse)    Negative: Difficult to awaken or acting confused  " (e.g., disoriented, slurred speech)    Negative: [1] SEVERE abdominal pain (e.g., excruciating) AND [2] constant AND [3] present > 1 hour    Negative: Severe bleeding (e.g., continuous red blood from vagina, or large blood clots)    Negative: Umbilical cord hanging out of the vagina (shiny, white, curled appearance, \"like telephone cord\")    Negative: Uncontrollable urge to push (i.e., feels like baby is coming out now)    Negative: Can see baby    Negative: Sounds like a life-threatening emergency to the triager    Protocols used: PREGNANCY - LABOR - -ADULT-AH      "

## 2019-04-26 NOTE — PROGRESS NOTES
S:pt admitted late yesterday after having painful and regular ctx every 5-10 min starting at 8am. Was doing her nifedipine 10mg every 6 hours. Had called clinic a couple times and discussed taking 20mg but checked her BP and it was her baseline 100s/60 so RN said to be cautious to double dose. Then started to feel nauseous even though wasn't working and just resting and in the evening the ctx pain really worsened and then started to have vomiting. Never tried the vistaril b/c had been vomiting and wasn't sure if she should so called back and then came in.    Started on mag around MN and nifedipine stopped. No more vomiting. A little nausea and foggy head feeling from the mag but not nearly as bad as before hospitalization. Ctx have really calmed down. Just feeling a very occasional one and not as intense. Baby is moving a lot    O:/62-89, HR 60s-70s, afebrile  Abd:soft, NT, gravid  [place]:rare ctx  EFM:130s, good variability, reactive      A/P: 33 y.o  @ 30+3 with bicornuate uterus and sympomatic  ctx.  Patient with closed cvx but low fetal station(per Dr. Carmona), stable from check 3 days ago in the office   FFN was neg  so reassuring  Patient is now having resolution of her ctx and tolerating the mag fine  Already had BMZ on  and  so no rescue dose indicated at this time  Plan will be to cont mag until tomorrow AM and as long as minimal ctx will attempt to transition back to nifedipine and try 20mg q6 hrs rather than 10mg. Discussed off label usage of oral mag 400mg BID, as well as vistaril  One other option would be 48 hours of indocin as well, if needed

## 2019-04-26 NOTE — PLAN OF CARE
Pt was able to sleep during last half of the night.  UC's occasional.  Pt reports abdomen still feels tender but she feels cramping much less often.  Magnesium infusing at 2g/hr.  Pt denies needs at this time.

## 2019-04-26 NOTE — PLAN OF CARE
Magnesium Sulfate infusion running for  labor and neuroprotection.  Contractions have lessened since admission; pt denies pain, notes occasional cramping.  Active baby, accels present & decels absent.  Plan is to continue Magnesium Sulfate until tomorrow and possibly discontinue it when Dr. Ireland rounds.

## 2019-04-27 LAB
BACTERIA SPEC CULT: NO GROWTH
Lab: NORMAL
SPECIMEN SOURCE: NORMAL

## 2019-04-27 PROCEDURE — 25000128 H RX IP 250 OP 636: Performed by: OBSTETRICS & GYNECOLOGY

## 2019-04-27 PROCEDURE — 25000132 ZZH RX MED GY IP 250 OP 250 PS 637: Performed by: OBSTETRICS & GYNECOLOGY

## 2019-04-27 PROCEDURE — 12000000 ZZH R&B MED SURG/OB

## 2019-04-27 PROCEDURE — 25800030 ZZH RX IP 258 OP 636: Performed by: OBSTETRICS & GYNECOLOGY

## 2019-04-27 PROCEDURE — 99231 SBSQ HOSP IP/OBS SF/LOW 25: CPT | Performed by: OBSTETRICS & GYNECOLOGY

## 2019-04-27 RX ORDER — NIFEDIPINE 10 MG/1
20 CAPSULE ORAL EVERY 6 HOURS SCHEDULED
Status: DISCONTINUED | OUTPATIENT
Start: 2019-04-27 | End: 2019-04-28 | Stop reason: HOSPADM

## 2019-04-27 RX ORDER — HYDROXYZINE HYDROCHLORIDE 50 MG/1
50-100 TABLET, FILM COATED ORAL AT BEDTIME
Status: DISCONTINUED | OUTPATIENT
Start: 2019-04-27 | End: 2019-04-28 | Stop reason: HOSPADM

## 2019-04-27 RX ORDER — AMOXICILLIN 250 MG
1-2 CAPSULE ORAL AT BEDTIME
Status: DISCONTINUED | OUTPATIENT
Start: 2019-04-27 | End: 2019-04-28 | Stop reason: HOSPADM

## 2019-04-27 RX ADMIN — NIFEDIPINE 20 MG: 10 CAPSULE ORAL at 16:00

## 2019-04-27 RX ADMIN — MAGNESIUM SULFATE HEPTAHYDRATE 2 G/HR: 40 INJECTION, SOLUTION INTRAVENOUS at 07:46

## 2019-04-27 RX ADMIN — SENNOSIDES AND DOCUSATE SODIUM 1 TABLET: 8.6; 5 TABLET ORAL at 22:10

## 2019-04-27 RX ADMIN — DOCUSATE SODIUM 100 MG: 100 CAPSULE, LIQUID FILLED ORAL at 07:47

## 2019-04-27 RX ADMIN — POLYETHYLENE GLYCOL 3350 17 G: 17 POWDER, FOR SOLUTION ORAL at 07:47

## 2019-04-27 RX ADMIN — NIFEDIPINE 20 MG: 10 CAPSULE ORAL at 09:50

## 2019-04-27 RX ADMIN — PRENATAL VIT W/ FE FUMARATE-FA TAB 27-0.8 MG 1 TABLET: 27-0.8 TAB at 09:59

## 2019-04-27 RX ADMIN — HYDROXYZINE HYDROCHLORIDE 100 MG: 50 TABLET, FILM COATED ORAL at 22:06

## 2019-04-27 RX ADMIN — NIFEDIPINE 20 MG: 10 CAPSULE ORAL at 22:06

## 2019-04-27 RX ADMIN — SODIUM CHLORIDE, POTASSIUM CHLORIDE, SODIUM LACTATE AND CALCIUM CHLORIDE: 600; 310; 30; 20 INJECTION, SOLUTION INTRAVENOUS at 05:48

## 2019-04-27 NOTE — PLAN OF CARE
Pt here for  labor, Magnesium sulfate infusing. Reflexes WNL, pt reports feeling short of breath with ambulation- lungs clear and O2 sats @ 100% on RA. [place] 1-2 times every hour, pt reports pain with contractions which improves after emptying bladder. PCDs in place overnight. Reflexes WNL. Spouse at bedside, supportive. VSS. Patient also c/o of feeling constipated colace given last evening. Continue to monitor.

## 2019-04-27 NOTE — PROGRESS NOTES
S: had a much better day yesterday. Rarely felt ctx, maybe one an hour at most. As soon as emptied her bladder would resolve. Voiding every 2 hours and going 300-400cc a time but definitely more ctx before voids. Overnight had a more restless night. Didn't sleep well and had more cramping/ctx. Picked up about 2x/hr at most though.  Good Fm.  Hasn't had a BM since Thursday. Doing colace BID and started miralax yesterday. Tolerating regular diet. Head is foggy and blurry vision but o/w tolerating the magnesium ok    O:VSS, afebrile  Abd:soft, gravid  [place]:no ctx in the last 30 min or so  EfM: 130s, good variability reactive      A/P 33 y.o  at 30+4 HD#3 for  ctx but closed cervix and neg FFN in setting of bicornuate uterus  Mag on for about 41 hours so will turn it off today and switch her to 20mg nifedipine every 6 hours. Had been doing 10mg prior    BMZ  and . Will only do rescue course if indicated when it's been at least 30 days from original course    Can do vistaril 50-100mg for sleep but likely the higher dose will work better    Can consider indocin or oral magnesium if needed    Monitor through the day today.    Anticipate D/C home tomorrow and off work for at least one week until reevaluation

## 2019-04-28 VITALS
WEIGHT: 124 LBS | BODY MASS INDEX: 24.35 KG/M2 | RESPIRATION RATE: 16 BRPM | SYSTOLIC BLOOD PRESSURE: 113 MMHG | DIASTOLIC BLOOD PRESSURE: 68 MMHG | OXYGEN SATURATION: 100 % | HEART RATE: 72 BPM | HEIGHT: 60 IN | TEMPERATURE: 98.9 F

## 2019-04-28 PROCEDURE — 25000132 ZZH RX MED GY IP 250 OP 250 PS 637: Performed by: OBSTETRICS & GYNECOLOGY

## 2019-04-28 PROCEDURE — 99238 HOSP IP/OBS DSCHRG MGMT 30/<: CPT | Performed by: OBSTETRICS & GYNECOLOGY

## 2019-04-28 RX ORDER — TRIAMCINOLONE ACETONIDE 1 MG/G
OINTMENT TOPICAL 2 TIMES DAILY
Qty: 80 G | Refills: 0 | Status: SHIPPED | OUTPATIENT
Start: 2019-04-28 | End: 2019-06-12

## 2019-04-28 RX ADMIN — NIFEDIPINE 20 MG: 10 CAPSULE ORAL at 09:55

## 2019-04-28 RX ADMIN — NIFEDIPINE 20 MG: 10 CAPSULE ORAL at 04:17

## 2019-04-28 RX ADMIN — PRENATAL VIT W/ FE FUMARATE-FA TAB 27-0.8 MG 1 TABLET: 27-0.8 TAB at 09:59

## 2019-04-28 NOTE — PROGRESS NOTES
S:mag turned off yesterday morning and transitioned to nifedipine 20mg q6 hrs and has really been doing well. Minimal ctx. Did move around the room yesterday and it didn't make her contract more. Baby is moving a lot. Took 100mg of the vistaril overnight and slept soundly. Still a little groggy today but def worked better for her.  Patient had a contact dermatitis to her toco box and belt. Mostly just red one one small blister off to the left side of abdomen where the belt was. Just itching a little    O:VSS, afeb  Abdomen:soft and gravid and baby off to the left. Skin is red in a strip where the belt was and then a box shape where toco was and one small blister on left. No ulceration or open skin  [place]/efm pending for this AM    A/P: G1Po with bicornuate uterus admitted for PTctx but not labor. Now 30+5  discharge home with 20mg q6hrs nifedipine and vistaril for sleep  F/u with me as scheduled 5/8 and if more ctx in the meantime will call/come in  Off work on bedrest this week until can be seen but may need to stay out of work for time being  Triamcinolone for contact dermatitis from the toco

## 2019-04-28 NOTE — DISCHARGE INSTRUCTIONS
Discharge Instruction for Undelivered Patients      You were seen for: Labor Assessment  We Consulted: Dr. Ireland  You had (Test or Medicine):non stress test, tocolytics, magnesium sulfate, nifedipine     Diet:   Drink 8 to 12 glasses of liquids (milk, juice, water) every day.  You may eat meals and snacks.     Activity:  Rest the pelvic area. No sex. Do not stimulate breasts or nipples.  Stay on bed rest or partial bed rest. This means as instructed per Dr. Ireland  Call your doctor or nurse midwife if your baby is moving less than usual.     Call your provider if you notice:  Swelling in your face or increased swelling in your hands or legs.  Headaches that are not relieved by Tylenol (acetaminophen).  Changes in your vision (blurring: seeing spots or stars.)  Nausea (sick to your stomach) and vomiting (throwing up).   Weight gain of 5 pounds or more per week.  Heartburn that doesn't go away.  Signs of bladder infection: pain when you urinate (use the toilet), need to go more often and more urgently.  The bag of kingsley (rupture of membranes) breaks, or you notice leaking in your underwear.  Bright red blood in your underwear.  Abdominal (lower belly) or stomach pain.  For first baby: Contractions (tightening) less than 5 minutes apart for one hour or more.  *If less than 34 weeks: Contractions (tightenings) more than 6 times in one hour.  Increase or change in vaginal discharge (note the color and amount)      Follow-up:  As scheduled in the clinic 5/8/19

## 2019-04-28 NOTE — PLAN OF CARE
Pt on PO Nifedapine 20 mg q6h. Denies feeling any contractions overnight. Monitoring qshift, no contractions noted. Denies pain. VSS. Membranes intact. Voiding. Slept well overnight after Atarax 100mg. Was noted to have redness and one intact blister to abdomen where straps and monitors were in place during continuous monitoring. PCDs on overnight. Possible discharge today, continue to monitor.

## 2019-04-28 NOTE — PLAN OF CARE
Magnesium sulfate infusion discontinued this am and changed over to oral nifedipine.  Pt doing well and remains stable this shift.  Denies contractions, vaginal bleeding or fluid leaking.  Pt was able to independently perform bathroom activities without difficultly and up ad jimmie in room for brief periods.  Plan to monitor with EFM Q shift and will reassess for possible discharge in am if stable.

## 2019-04-28 NOTE — PLAN OF CARE
"Pt remained stable for discharge.  Denies any change in contraction frequency.  Denies any vaginal discharge or bleeding.  Good fetal movement and reactive NST prior to discharge.  Abdominal skin irritation and blister on left side appears less agitated only slightly reddened this morning.  No change in borders.  Pt denies pain only \"a little itchy\".  Dr. Ireland assessed at bedside with rounding this am and does not feel an order for wound consult necessary at this time, pt agreeable. Given rx for steroid cream prior to discharge and instructed pt to keep clean dry and open to air.   labor precaution instructions reviewed and given to pt.  Verbalized understanding and agreement with plan of care.  Follow up planned for 19 with Dr. Ireland.  Pt discharged to home via w/c escorted by RN.  "

## 2019-04-28 NOTE — PROVIDER NOTIFICATION
04/27/19 1463   Provider Notification   Provider Name/Title Dr Ireland    Method of Notification Phone   Request Evaluate - Remote   Notification Reason Status Update     Pt being monitored qshift. During monitoring did have decel lasting about a minute. Variability is moderate, having accels. Is it okay to continuing intermittent monitoring?  Order to keep patient on monitor for 10-15 min more, as long as no further decels and FHR continues to have moderate variability and accels okay to take pt off monitor.

## 2019-04-29 ENCOUNTER — HOSPITAL ENCOUNTER (INPATIENT)
Facility: CLINIC | Age: 34
LOS: 4 days | Discharge: HOME | End: 2019-05-03
Attending: OBSTETRICS & GYNECOLOGY | Admitting: OBSTETRICS & GYNECOLOGY
Payer: COMMERCIAL

## 2019-04-29 ENCOUNTER — NURSE TRIAGE (OUTPATIENT)
Dept: NURSING | Facility: CLINIC | Age: 34
End: 2019-04-29

## 2019-04-29 PROBLEM — Z36.89 ENCOUNTER FOR TRIAGE IN PREGNANT PATIENT: Status: ACTIVE | Noted: 2019-04-29

## 2019-04-29 LAB — RUPTURE OF FETAL MEMBRANES BY ROM PLUS: POSITIVE

## 2019-04-29 PROCEDURE — 12000000 ZZH R&B MED SURG/OB

## 2019-04-29 PROCEDURE — G0463 HOSPITAL OUTPT CLINIC VISIT: HCPCS | Mod: 25

## 2019-04-29 PROCEDURE — 84112 EVAL AMNIOTIC FLUID PROTEIN: CPT | Performed by: OBSTETRICS & GYNECOLOGY

## 2019-04-29 RX ORDER — AZITHROMYCIN 250 MG/1
1000 TABLET, FILM COATED ORAL ONCE
Status: COMPLETED | OUTPATIENT
Start: 2019-04-29 | End: 2019-04-30

## 2019-04-29 RX ORDER — NIFEDIPINE 10 MG/1
20 CAPSULE ORAL EVERY 6 HOURS SCHEDULED
Status: DISCONTINUED | OUTPATIENT
Start: 2019-04-29 | End: 2019-04-30

## 2019-04-29 RX ORDER — AMPICILLIN 2 G/1
2 INJECTION, POWDER, FOR SOLUTION INTRAVENOUS EVERY 6 HOURS
Status: DISCONTINUED | OUTPATIENT
Start: 2019-04-29 | End: 2019-05-01

## 2019-04-29 RX ORDER — BETAMETHASONE SODIUM PHOSPHATE AND BETAMETHASONE ACETATE 3; 3 MG/ML; MG/ML
12 INJECTION, SUSPENSION INTRA-ARTICULAR; INTRALESIONAL; INTRAMUSCULAR; SOFT TISSUE ONCE
Status: COMPLETED | OUTPATIENT
Start: 2019-04-29 | End: 2019-04-30

## 2019-04-29 RX ORDER — ONDANSETRON 2 MG/ML
4 INJECTION INTRAMUSCULAR; INTRAVENOUS EVERY 6 HOURS PRN
Status: DISCONTINUED | OUTPATIENT
Start: 2019-04-29 | End: 2019-05-01

## 2019-04-29 RX ORDER — DOCUSATE SODIUM 100 MG/1
100 CAPSULE, LIQUID FILLED ORAL 2 TIMES DAILY
Status: DISCONTINUED | OUTPATIENT
Start: 2019-04-29 | End: 2019-05-01

## 2019-04-29 RX ORDER — AMOXICILLIN 250 MG/1
250 CAPSULE ORAL 3 TIMES DAILY
Status: DISCONTINUED | OUTPATIENT
Start: 2019-05-01 | End: 2019-05-01

## 2019-04-30 ENCOUNTER — APPOINTMENT (OUTPATIENT)
Dept: ULTRASOUND IMAGING | Facility: CLINIC | Age: 34
End: 2019-04-30
Attending: OBSTETRICS & GYNECOLOGY
Payer: COMMERCIAL

## 2019-04-30 LAB
ABO + RH BLD: NORMAL
ABO + RH BLD: NORMAL
BASOPHILS # BLD AUTO: 0 10E9/L (ref 0–0.2)
BASOPHILS NFR BLD AUTO: 0.3 %
BLD GP AB SCN SERPL QL: NORMAL
BLOOD BANK CMNT PATIENT-IMP: NORMAL
DIFFERENTIAL METHOD BLD: ABNORMAL
EOSINOPHIL # BLD AUTO: 0.3 10E9/L (ref 0–0.7)
EOSINOPHIL NFR BLD AUTO: 2.5 %
ERYTHROCYTE [DISTWIDTH] IN BLOOD BY AUTOMATED COUNT: 11.8 % (ref 10–15)
GP B STREP DNA SPEC QL NAA+PROBE: NEGATIVE
HCT VFR BLD AUTO: 39.4 % (ref 35–47)
HGB BLD-MCNC: 13.9 G/DL (ref 11.7–15.7)
IMM GRANULOCYTES # BLD: 0.1 10E9/L (ref 0–0.4)
IMM GRANULOCYTES NFR BLD: 0.5 %
LYMPHOCYTES # BLD AUTO: 2.7 10E9/L (ref 0.8–5.3)
LYMPHOCYTES NFR BLD AUTO: 23.5 %
MCH RBC QN AUTO: 32.3 PG (ref 26.5–33)
MCHC RBC AUTO-ENTMCNC: 35.3 G/DL (ref 31.5–36.5)
MCV RBC AUTO: 91 FL (ref 78–100)
MONOCYTES # BLD AUTO: 0.8 10E9/L (ref 0–1.3)
MONOCYTES NFR BLD AUTO: 6.8 %
NEUTROPHILS # BLD AUTO: 7.6 10E9/L (ref 1.6–8.3)
NEUTROPHILS NFR BLD AUTO: 66.4 %
NRBC # BLD AUTO: 0 10*3/UL
NRBC BLD AUTO-RTO: 0 /100
PLATELET # BLD AUTO: 169 10E9/L (ref 150–450)
RBC # BLD AUTO: 4.31 10E12/L (ref 3.8–5.2)
SPECIMEN EXP DATE BLD: NORMAL
SPECIMEN SOURCE: NORMAL
WBC # BLD AUTO: 11.5 10E9/L (ref 4–11)

## 2019-04-30 PROCEDURE — 25000132 ZZH RX MED GY IP 250 OP 250 PS 637: Performed by: OBSTETRICS & GYNECOLOGY

## 2019-04-30 PROCEDURE — 59025 FETAL NON-STRESS TEST: CPT

## 2019-04-30 PROCEDURE — 99232 SBSQ HOSP IP/OBS MODERATE 35: CPT | Performed by: OBSTETRICS & GYNECOLOGY

## 2019-04-30 PROCEDURE — 87653 STREP B DNA AMP PROBE: CPT | Performed by: OBSTETRICS & GYNECOLOGY

## 2019-04-30 PROCEDURE — 86900 BLOOD TYPING SEROLOGIC ABO: CPT | Performed by: OBSTETRICS & GYNECOLOGY

## 2019-04-30 PROCEDURE — 25000128 H RX IP 250 OP 636: Performed by: OBSTETRICS & GYNECOLOGY

## 2019-04-30 PROCEDURE — 25800030 ZZH RX IP 258 OP 636: Performed by: OBSTETRICS & GYNECOLOGY

## 2019-04-30 PROCEDURE — 25000128 H RX IP 250 OP 636

## 2019-04-30 PROCEDURE — 76819 FETAL BIOPHYS PROFIL W/O NST: CPT

## 2019-04-30 PROCEDURE — 76816 OB US FOLLOW-UP PER FETUS: CPT

## 2019-04-30 PROCEDURE — 85025 COMPLETE CBC W/AUTO DIFF WBC: CPT | Performed by: OBSTETRICS & GYNECOLOGY

## 2019-04-30 PROCEDURE — 12000000 ZZH R&B MED SURG/OB

## 2019-04-30 PROCEDURE — 36415 COLL VENOUS BLD VENIPUNCTURE: CPT | Performed by: OBSTETRICS & GYNECOLOGY

## 2019-04-30 PROCEDURE — 86901 BLOOD TYPING SEROLOGIC RH(D): CPT | Performed by: OBSTETRICS & GYNECOLOGY

## 2019-04-30 PROCEDURE — G0463 HOSPITAL OUTPT CLINIC VISIT: HCPCS | Mod: 25

## 2019-04-30 PROCEDURE — 86850 RBC ANTIBODY SCREEN: CPT | Performed by: OBSTETRICS & GYNECOLOGY

## 2019-04-30 RX ORDER — TRIAMCINOLONE ACETONIDE 1 MG/G
OINTMENT TOPICAL 2 TIMES DAILY
Status: DISCONTINUED | OUTPATIENT
Start: 2019-04-30 | End: 2019-05-01

## 2019-04-30 RX ORDER — MAGNESIUM SULFATE IN WATER 40 MG/ML
1.5 INJECTION, SOLUTION INTRAVENOUS CONTINUOUS
Status: DISCONTINUED | OUTPATIENT
Start: 2019-04-30 | End: 2019-05-01

## 2019-04-30 RX ORDER — HYDROXYZINE HYDROCHLORIDE 50 MG/1
100 TABLET, FILM COATED ORAL
Status: DISCONTINUED | OUTPATIENT
Start: 2019-04-30 | End: 2019-05-01

## 2019-04-30 RX ORDER — ACETAMINOPHEN 500 MG
500 TABLET ORAL EVERY 6 HOURS PRN
Status: DISCONTINUED | OUTPATIENT
Start: 2019-04-30 | End: 2019-05-01

## 2019-04-30 RX ORDER — BETAMETHASONE SODIUM PHOSPHATE AND BETAMETHASONE ACETATE 3; 3 MG/ML; MG/ML
12 INJECTION, SUSPENSION INTRA-ARTICULAR; INTRALESIONAL; INTRAMUSCULAR; SOFT TISSUE ONCE
Status: COMPLETED | OUTPATIENT
Start: 2019-05-01 | End: 2019-05-01

## 2019-04-30 RX ORDER — MAGNESIUM SULFATE HEPTAHYDRATE 40 MG/ML
4 INJECTION, SOLUTION INTRAVENOUS ONCE
Status: COMPLETED | OUTPATIENT
Start: 2019-04-30 | End: 2019-04-30

## 2019-04-30 RX ORDER — PRENATAL VIT/IRON FUM/FOLIC AC 27MG-0.8MG
1 TABLET ORAL DAILY
Status: DISCONTINUED | OUTPATIENT
Start: 2019-04-30 | End: 2019-04-30

## 2019-04-30 RX ORDER — MAGNESIUM SULFATE HEPTAHYDRATE 40 MG/ML
INJECTION, SOLUTION INTRAVENOUS
Status: COMPLETED
Start: 2019-04-30 | End: 2019-04-30

## 2019-04-30 RX ORDER — HYDROXYZINE HYDROCHLORIDE 50 MG/1
50 TABLET, FILM COATED ORAL AT BEDTIME
Status: DISCONTINUED | OUTPATIENT
Start: 2019-04-30 | End: 2019-05-01

## 2019-04-30 RX ORDER — CALCIUM GLUCONATE 94 MG/ML
1 INJECTION, SOLUTION INTRAVENOUS
Status: COMPLETED | OUTPATIENT
Start: 2019-04-30 | End: 2019-05-01

## 2019-04-30 RX ORDER — SODIUM CHLORIDE, SODIUM LACTATE, POTASSIUM CHLORIDE, CALCIUM CHLORIDE 600; 310; 30; 20 MG/100ML; MG/100ML; MG/100ML; MG/100ML
INJECTION, SOLUTION INTRAVENOUS CONTINUOUS
Status: DISCONTINUED | OUTPATIENT
Start: 2019-04-30 | End: 2019-05-01

## 2019-04-30 RX ORDER — CALCIUM CARBONATE 500 MG/1
1000 TABLET, CHEWABLE ORAL 2 TIMES DAILY
Status: DISCONTINUED | OUTPATIENT
Start: 2019-04-30 | End: 2019-05-01

## 2019-04-30 RX ORDER — PRENATAL VIT/IRON FUM/FOLIC AC 27MG-0.8MG
1 TABLET ORAL DAILY
Status: DISCONTINUED | OUTPATIENT
Start: 2019-05-01 | End: 2019-05-01

## 2019-04-30 RX ORDER — MAGNESIUM SULFATE HEPTAHYDRATE 40 MG/ML
INJECTION, SOLUTION INTRAVENOUS
Status: DISCONTINUED
Start: 2019-04-30 | End: 2019-04-30 | Stop reason: WASHOUT

## 2019-04-30 RX ADMIN — AMPICILLIN SODIUM 2 G: 2 INJECTION, POWDER, FOR SOLUTION INTRAVENOUS at 07:05

## 2019-04-30 RX ADMIN — TRIAMCINOLONE ACETONIDE: 1 OINTMENT TOPICAL at 20:31

## 2019-04-30 RX ADMIN — HYDROXYZINE HYDROCHLORIDE 50 MG: 50 TABLET, FILM COATED ORAL at 21:02

## 2019-04-30 RX ADMIN — MAGNESIUM SULFATE IN WATER 2.5 G/HR: 40 INJECTION, SOLUTION INTRAVENOUS at 13:20

## 2019-04-30 RX ADMIN — BETAMETHASONE SODIUM PHOSPHATE AND BETAMETHASONE ACETATE 12 MG: 3; 3 INJECTION, SUSPENSION INTRA-ARTICULAR; INTRALESIONAL; INTRAMUSCULAR at 00:43

## 2019-04-30 RX ADMIN — PRENATAL VIT W/ FE FUMARATE-FA TAB 27-0.8 MG 1 TABLET: 27-0.8 TAB at 15:19

## 2019-04-30 RX ADMIN — AZITHROMYCIN MONOHYDRATE 1000 MG: 250 TABLET ORAL at 00:39

## 2019-04-30 RX ADMIN — SODIUM CHLORIDE, POTASSIUM CHLORIDE, SODIUM LACTATE AND CALCIUM CHLORIDE 500 ML: 600; 310; 30; 20 INJECTION, SOLUTION INTRAVENOUS at 08:30

## 2019-04-30 RX ADMIN — MAGNESIUM SULFATE HEPTAHYDRATE 4 G: 40 INJECTION, SOLUTION INTRAVENOUS at 03:55

## 2019-04-30 RX ADMIN — MAGNESIUM SULFATE IN WATER 4 G: 40 INJECTION, SOLUTION INTRAVENOUS at 03:55

## 2019-04-30 RX ADMIN — AMPICILLIN SODIUM 2 G: 2 INJECTION, POWDER, FOR SOLUTION INTRAVENOUS at 19:06

## 2019-04-30 RX ADMIN — AMPICILLIN SODIUM 2 G: 2 INJECTION, POWDER, FOR SOLUTION INTRAVENOUS at 01:12

## 2019-04-30 RX ADMIN — AMPICILLIN SODIUM 2 G: 2 INJECTION, POWDER, FOR SOLUTION INTRAVENOUS at 13:13

## 2019-04-30 RX ADMIN — NIFEDIPINE 20 MG: 10 CAPSULE ORAL at 03:30

## 2019-04-30 RX ADMIN — SODIUM CHLORIDE, POTASSIUM CHLORIDE, SODIUM LACTATE AND CALCIUM CHLORIDE: 600; 310; 30; 20 INJECTION, SOLUTION INTRAVENOUS at 03:51

## 2019-04-30 RX ADMIN — MAGNESIUM SULFATE IN WATER 2 G/HR: 40 INJECTION, SOLUTION INTRAVENOUS at 04:26

## 2019-04-30 RX ADMIN — MAGNESIUM SULFATE IN WATER 2.5 G/HR: 40 INJECTION, SOLUTION INTRAVENOUS at 21:20

## 2019-04-30 RX ADMIN — SODIUM CHLORIDE, POTASSIUM CHLORIDE, SODIUM LACTATE AND CALCIUM CHLORIDE: 600; 310; 30; 20 INJECTION, SOLUTION INTRAVENOUS at 11:27

## 2019-04-30 NOTE — CONSULTS
Neonatology Antepartum Counseling Consult      I was asked to provide antepartum counseling for Annie Clark at the request of Berenice Jiménez MD secondary to  labor, PROM. Ms. Clark is currently 31 weeks and has a hx significant for bicornate uterus and PTL. Betamethasone was administered last month x 2 and again today. Ms. Clark, accompanied by her  (Jaya), was counseled on the expected hospital course, potential risks, and outcomes associated with an infant born at approximately 31 weeks gestation. The counseling included: morbidity, mortality, initial delivery room stabilization, respiratory course, lung development, patent ductus arteriosus, retinopathy of prematurity, hyperbilirubinemia, hemodynamic support, infection (including NEC), intraventricular hemorrhage, nutrition, growth and development, and long term outcomes. Please feel free to call with any additional questions or concerns.        Floor Time (min): 30  Face to Face Time (min): 30  Total Time (minutes): 30  More than 50% of my time was spent in direct, face to face, antepartum counseling with the above patient.    Nan Martinez, BRENTP, CNP 2019 4:33 AM

## 2019-04-30 NOTE — PLAN OF CARE
Pt admitted with mild cramping after PROM that increased to 5/10 with every contraction. Dr. Jiménez updated, magnesium started with loading dose and nifedipine given early. Per MD continue nifedipine every 6 hrs along with magnesium drip. Vital signs stable and no sign of mag toxicity. FHR with inter,ittent variables and lates, MD aware. NNP visited pt and  to educate them about  infants and expected NICU stay.  Pt admitted with lower abdominal and left flank rash, pt believes rash is a reaction to monitor belts used during her previous hospitalization. Belly band in place, rash currently not worsening.

## 2019-04-30 NOTE — PROVIDER NOTIFICATION
04/30/19 0700   Provider Notification   Provider Name/Title Dr Jiménez   Method of Notification In Department   Per Dr Jiménez, Dr Ireland will follow pt this AM.

## 2019-04-30 NOTE — PLAN OF CARE
Pt admitted for PROM. Monitors applied, assessment obtained. Plan of care discussed with pt and  Jaya. Re-oriented pt to call light, room and unit. Educated pt about labor progression and epidural process. Pt and 's questions answered. Will continue to monitor.

## 2019-04-30 NOTE — PROVIDER NOTIFICATION
Per Dr. Jiménez add magnesium drip for tocolysis order and give nifedipine earlier. Also, start LR and titrate to a total of 125ml per hour total fluid volume.

## 2019-04-30 NOTE — PLAN OF CARE
Pt , admitted last night with PPROM, leaking clear fluid. B+, GBS pending, UTD on immunizations. Ctx 1-2/60 min, pt coping well, rates pain 3/10. , cat 1, late and variable decels resolved after discontinued nifedipine. Pt on bedrest with bathroom privileges, up to bathroom with SBA. Mag Sulfate @ 2.5/hr, c/o occasional blurry vision. LR @ 63/hr. Abd rash unchanged, using Kenalog ointment. SBAR report given to Ines Fields RN , to assume care.

## 2019-04-30 NOTE — H&P
Patient admitted for Premature rupture of membranes.   Has been hospitalized recently for  labor and returned today with ruptured membranes.  She has a bicornuate uterus  Currently on procardia 20 mg q 6hr  Already received 2 doses of steroids on  &     Repeat single dose of betamethasone since has been more than 3 wks since last treated  Start magnesium sulfate for neuroprotection  Start antibiotics    Patient got more uncomfortable  With regular contractions after admission so bedside ultrasound done by me to confirm cephalic presentation.    Patient currently 31 week 0 days       NNP called to visit with patient  I have not checked cervix until we are certain she is in labor .

## 2019-04-30 NOTE — PROGRESS NOTES
Evaluated patient who was admitted overnight after PPROM at 30+6 now 31+0. PPROM happened around 2130. Wasn't lisset prior to that and had been on modified bedrest since her d/c home on 4/28.  Continued to leak so present to MAC and ROM confirmed.    Patient's contractions picked up and were as close as every 5 min and painful. Was started on mag at 2g/hr and was continued initially on her nifedipine 20mg q6hrs. Once the mag started the patient's contraction spaced to q7-9 min and were shorter and less intense.    BP has tolerated the higher nifedipine dose. Patient runs /60-70s and stayed about that on the nifedipine. However though fetal tracing is reactive she does have short and subtle late decelrations with her ctx.    Speculum exam done. Has pooling of slight blood tinged amniotic fluid. Cervix is off to her left. Appears to be about the same effacement as in the office, definitely reasonably thick visually. No visible dilation but during a ctx she had some mucous pass through the os and it is maybe FT visually.  Understands that digital exam will be deferred unless actively laboring to avoid increased infection risk.    At this point we increased her mag to 2.5g/hr. Will hold her nifedipine for now to see if increasing her BP helps to eliminate the subtle lates the baby is having. If that is the case then we will attempt to do indocin for 48 hrs instead with the mag as well.      Patient had BMZ #3 around MN and per MFM would recommend a second dose and not just a one time rescue dose. Will tocolyze at least until can get 24 hrs after the second dose.    Patient has normal temp and not tachy, her WBC is minimally elevated c/w normal pregnancy, there is no fetal tachycardia and no uterine tenderness. The amniotic fluid appears clear and no foul odor.    Discussed that if continues to breakthrough and contract through tocolysis, shows any signs of chorio or fetal distress we will stop tocolysis and  proceed with delivery. If fetal intolerance to labor may need to do c/s. Discussed all of this with the patient and she is aware of plan    Awaiting now to see if MFM can do growth U/s and formal consult. If not able will at least get growth U/S with our radiology dept for EFW as last U/S was 3 weeks ago.

## 2019-04-30 NOTE — PLAN OF CARE
Pt arrives via wheelchair, admitted to MAC 1 for rule out rupture of membranes.  External monitors applied.  Pt reports feeling additional leaking with urination around 2130 this evening.  She then felt more leaking on two further occasions.  She reports the fluid was clear to cloudy and orange to pink tinged.

## 2019-04-30 NOTE — PLAN OF CARE
Dr Ireland at pt's bedside to discuss POC. MD updated with ctx pattern and recurrent late and variable decels. Orders to give fluid bolus and increase mag sulfate received and started. Will continue to monitor per POC.

## 2019-04-30 NOTE — TELEPHONE ENCOUNTER
"Patient states she is 30 weeks pregnant. EDC 7/2/2019. First baby.  States has had clear vaginal fluid leaking x 2 in the past 30 minutes.  Describes amount as \"soaking my underwear both times.\"   Denies contractions.  Denies abdominal pain.  Denies vaginal bleeding.  States baby has been moving as usual today.  States is \"high risk\" and recently hospitalized 4/25/19 - 4/28/19 for   [facility] is Dr. Ireland/ [facility].  Plans to deliver at [facility].    Protocol-  Pregnancy- Rupture of Membranes- Adult  Care advice reviewed.   Disposition-  Go to L&D now  Caller states understanding of the recommended disposition.   States will leave now and go to [facility] L&D. Has a .  Advised to call back if further questions or concerns.     GEETHA PalN RN  [facility]     Reason for Disposition    Leakage of fluid from vagina    Additional Information    Negative: [1] SEVERE abdominal pain (e.g., excruciating) AND [2] constant AND [3] present > 1 hour    Negative: Severe bleeding (e.g., continuous red blood from vagina, or large blood clots)    Negative: Umbilical cord hanging out of the vagina (shiny, white, curled appearance, \"like telephone cord\")    Negative: Uncontrollable urge to push (i.e., feels like baby is coming out now)    Negative: Can see baby    Negative: Sounds like a life-threatening emergency to the triager    Negative: < 20 weeks pregnant    Negative: Vaginal bleeding    Protocols used: PREGNANCY - RUPTURE OF MEMBRANES-ADULT-    "

## 2019-05-01 LAB
ANION GAP SERPL CALCULATED.3IONS-SCNC: 9 MMOL/L (ref 3–14)
BUN SERPL-MCNC: 3 MG/DL (ref 7–30)
CALCIUM SERPL-MCNC: 6.5 MG/DL (ref 8.5–10.1)
CHLORIDE SERPL-SCNC: 108 MMOL/L (ref 94–109)
CO2 SERPL-SCNC: 22 MMOL/L (ref 20–32)
CREAT SERPL-MCNC: 0.44 MG/DL (ref 0.52–1.04)
ERYTHROCYTE [DISTWIDTH] IN BLOOD BY AUTOMATED COUNT: 12.2 % (ref 10–15)
GFR SERPL CREATININE-BSD FRML MDRD: >90 ML/MIN/{1.73_M2}
GLUCOSE SERPL-MCNC: 110 MG/DL (ref 70–99)
HCT VFR BLD AUTO: 37.5 % (ref 35–47)
HGB BLD-MCNC: 13.2 G/DL (ref 11.7–15.7)
MAGNESIUM SERPL-MCNC: 7.7 MG/DL (ref 1.6–2.3)
MCH RBC QN AUTO: 32 PG (ref 26.5–33)
MCHC RBC AUTO-ENTMCNC: 35.2 G/DL (ref 31.5–36.5)
MCV RBC AUTO: 91 FL (ref 78–100)
PLATELET # BLD AUTO: 177 10E9/L (ref 150–450)
POTASSIUM SERPL-SCNC: 3.5 MMOL/L (ref 3.4–5.3)
RBC # BLD AUTO: 4.13 10E12/L (ref 3.8–5.2)
SODIUM SERPL-SCNC: 139 MMOL/L (ref 133–144)
WBC # BLD AUTO: 17.7 10E9/L (ref 4–11)

## 2019-05-01 PROCEDURE — 59400 OBSTETRICAL CARE: CPT | Performed by: OBSTETRICS & GYNECOLOGY

## 2019-05-01 PROCEDURE — 25000132 ZZH RX MED GY IP 250 OP 250 PS 637: Performed by: OBSTETRICS & GYNECOLOGY

## 2019-05-01 PROCEDURE — 80048 BASIC METABOLIC PNL TOTAL CA: CPT | Performed by: OBSTETRICS & GYNECOLOGY

## 2019-05-01 PROCEDURE — 88307 TISSUE EXAM BY PATHOLOGIST: CPT | Mod: 26 | Performed by: OBSTETRICS & GYNECOLOGY

## 2019-05-01 PROCEDURE — 99231 SBSQ HOSP IP/OBS SF/LOW 25: CPT | Performed by: OBSTETRICS & GYNECOLOGY

## 2019-05-01 PROCEDURE — 88307 TISSUE EXAM BY PATHOLOGIST: CPT | Performed by: OBSTETRICS & GYNECOLOGY

## 2019-05-01 PROCEDURE — 12000035 ZZH R&B POSTPARTUM

## 2019-05-01 PROCEDURE — 72200001 ZZH LABOR CARE VAGINAL DELIVERY SINGLE

## 2019-05-01 PROCEDURE — 25800030 ZZH RX IP 258 OP 636: Performed by: OBSTETRICS & GYNECOLOGY

## 2019-05-01 PROCEDURE — 25000125 ZZHC RX 250

## 2019-05-01 PROCEDURE — 25000128 H RX IP 250 OP 636: Performed by: OBSTETRICS & GYNECOLOGY

## 2019-05-01 PROCEDURE — 36415 COLL VENOUS BLD VENIPUNCTURE: CPT | Performed by: OBSTETRICS & GYNECOLOGY

## 2019-05-01 PROCEDURE — 85027 COMPLETE CBC AUTOMATED: CPT | Performed by: OBSTETRICS & GYNECOLOGY

## 2019-05-01 PROCEDURE — 0KQM0ZZ REPAIR PERINEUM MUSCLE, OPEN APPROACH: ICD-10-PCS | Performed by: OBSTETRICS & GYNECOLOGY

## 2019-05-01 PROCEDURE — 83735 ASSAY OF MAGNESIUM: CPT | Performed by: OBSTETRICS & GYNECOLOGY

## 2019-05-01 RX ORDER — LIDOCAINE HYDROCHLORIDE 10 MG/ML
INJECTION, SOLUTION INFILTRATION; PERINEURAL
Status: DISCONTINUED
Start: 2019-05-01 | End: 2019-05-01 | Stop reason: HOSPADM

## 2019-05-01 RX ORDER — OXYTOCIN 10 [USP'U]/ML
10 INJECTION, SOLUTION INTRAMUSCULAR; INTRAVENOUS
Status: DISCONTINUED | OUTPATIENT
Start: 2019-05-01 | End: 2019-05-03 | Stop reason: HOSPADM

## 2019-05-01 RX ORDER — POLYETHYLENE GLYCOL 3350 17 G/17G
17 POWDER, FOR SOLUTION ORAL DAILY
Status: DISCONTINUED | OUTPATIENT
Start: 2019-05-01 | End: 2019-05-01

## 2019-05-01 RX ORDER — HYDROCORTISONE 2.5 %
CREAM (GRAM) TOPICAL 3 TIMES DAILY PRN
Status: DISCONTINUED | OUTPATIENT
Start: 2019-05-01 | End: 2019-05-03 | Stop reason: HOSPADM

## 2019-05-01 RX ORDER — OXYTOCIN/0.9 % SODIUM CHLORIDE 30/500 ML
PLASTIC BAG, INJECTION (ML) INTRAVENOUS
Status: DISCONTINUED
Start: 2019-05-01 | End: 2019-05-01 | Stop reason: HOSPADM

## 2019-05-01 RX ORDER — CALCIUM CARBONATE 500 MG/1
1000 TABLET, CHEWABLE ORAL 2 TIMES DAILY PRN
Status: DISCONTINUED | OUTPATIENT
Start: 2019-05-01 | End: 2019-05-01

## 2019-05-01 RX ORDER — HYDROXYZINE HYDROCHLORIDE 50 MG/1
50-100 TABLET, FILM COATED ORAL AT BEDTIME
Status: DISCONTINUED | OUTPATIENT
Start: 2019-05-01 | End: 2019-05-01

## 2019-05-01 RX ORDER — OXYCODONE HYDROCHLORIDE 5 MG/1
5 TABLET ORAL EVERY 4 HOURS PRN
Status: DISCONTINUED | OUTPATIENT
Start: 2019-05-01 | End: 2019-05-03 | Stop reason: HOSPADM

## 2019-05-01 RX ORDER — LANOLIN 100 %
OINTMENT (GRAM) TOPICAL
Status: DISCONTINUED | OUTPATIENT
Start: 2019-05-01 | End: 2019-05-03 | Stop reason: HOSPADM

## 2019-05-01 RX ORDER — BISACODYL 10 MG
10 SUPPOSITORY, RECTAL RECTAL DAILY PRN
Status: DISCONTINUED | OUTPATIENT
Start: 2019-05-03 | End: 2019-05-03 | Stop reason: HOSPADM

## 2019-05-01 RX ORDER — NIFEDIPINE 10 MG/1
20 CAPSULE ORAL ONCE
Status: COMPLETED | OUTPATIENT
Start: 2019-05-01 | End: 2019-05-01

## 2019-05-01 RX ORDER — AMOXICILLIN 250 MG
1 CAPSULE ORAL 2 TIMES DAILY
Status: DISCONTINUED | OUTPATIENT
Start: 2019-05-01 | End: 2019-05-03 | Stop reason: HOSPADM

## 2019-05-01 RX ORDER — OXYTOCIN/0.9 % SODIUM CHLORIDE 30/500 ML
340 PLASTIC BAG, INJECTION (ML) INTRAVENOUS CONTINUOUS PRN
Status: DISCONTINUED | OUTPATIENT
Start: 2019-05-01 | End: 2019-05-03 | Stop reason: HOSPADM

## 2019-05-01 RX ORDER — IBUPROFEN 400 MG/1
800 TABLET, FILM COATED ORAL EVERY 6 HOURS PRN
Status: DISCONTINUED | OUTPATIENT
Start: 2019-05-01 | End: 2019-05-03 | Stop reason: HOSPADM

## 2019-05-01 RX ORDER — OXYTOCIN/0.9 % SODIUM CHLORIDE 30/500 ML
100 PLASTIC BAG, INJECTION (ML) INTRAVENOUS CONTINUOUS
Status: DISCONTINUED | OUTPATIENT
Start: 2019-05-01 | End: 2019-05-03 | Stop reason: HOSPADM

## 2019-05-01 RX ORDER — OXYTOCIN/0.9 % SODIUM CHLORIDE 30/500 ML
PLASTIC BAG, INJECTION (ML) INTRAVENOUS
Status: COMPLETED
Start: 2019-05-01 | End: 2019-05-01

## 2019-05-01 RX ORDER — ACETAMINOPHEN 325 MG/1
650 TABLET ORAL EVERY 4 HOURS PRN
Status: DISCONTINUED | OUTPATIENT
Start: 2019-05-01 | End: 2019-05-03 | Stop reason: HOSPADM

## 2019-05-01 RX ORDER — AMOXICILLIN 250 MG
2 CAPSULE ORAL 2 TIMES DAILY
Status: DISCONTINUED | OUTPATIENT
Start: 2019-05-01 | End: 2019-05-03 | Stop reason: HOSPADM

## 2019-05-01 RX ORDER — NALOXONE HYDROCHLORIDE 0.4 MG/ML
.1-.4 INJECTION, SOLUTION INTRAMUSCULAR; INTRAVENOUS; SUBCUTANEOUS
Status: DISCONTINUED | OUTPATIENT
Start: 2019-05-01 | End: 2019-05-03 | Stop reason: HOSPADM

## 2019-05-01 RX ADMIN — NIFEDIPINE 20 MG: 10 CAPSULE ORAL at 13:34

## 2019-05-01 RX ADMIN — Medication 340 ML/HR: at 15:00

## 2019-05-01 RX ADMIN — POLYETHYLENE GLYCOL 3350 17 G: 17 POWDER, FOR SOLUTION ORAL at 10:18

## 2019-05-01 RX ADMIN — AMPICILLIN SODIUM 2 G: 2 INJECTION, POWDER, FOR SOLUTION INTRAVENOUS at 13:36

## 2019-05-01 RX ADMIN — TRIAMCINOLONE ACETONIDE: 1 OINTMENT TOPICAL at 10:23

## 2019-05-01 RX ADMIN — CALCIUM GLUCONATE 1 G: 98 INJECTION, SOLUTION INTRAVENOUS at 06:45

## 2019-05-01 RX ADMIN — OXYTOCIN-SODIUM CHLORIDE 0.9% IV SOLN 30 UNIT/500ML 340 ML/HR: 30-0.9/5 SOLUTION at 15:00

## 2019-05-01 RX ADMIN — IBUPROFEN 800 MG: 400 TABLET ORAL at 15:40

## 2019-05-01 RX ADMIN — SODIUM CHLORIDE, POTASSIUM CHLORIDE, SODIUM LACTATE AND CALCIUM CHLORIDE: 600; 310; 30; 20 INJECTION, SOLUTION INTRAVENOUS at 00:52

## 2019-05-01 RX ADMIN — SODIUM CHLORIDE, POTASSIUM CHLORIDE, SODIUM LACTATE AND CALCIUM CHLORIDE: 600; 310; 30; 20 INJECTION, SOLUTION INTRAVENOUS at 12:10

## 2019-05-01 RX ADMIN — ACETAMINOPHEN 650 MG: 325 TABLET, FILM COATED ORAL at 15:40

## 2019-05-01 RX ADMIN — AMPICILLIN SODIUM 2 G: 2 INJECTION, POWDER, FOR SOLUTION INTRAVENOUS at 00:50

## 2019-05-01 RX ADMIN — MAGNESIUM SULFATE IN WATER 2.5 G/HR: 40 INJECTION, SOLUTION INTRAVENOUS at 05:26

## 2019-05-01 RX ADMIN — AMPICILLIN SODIUM 2 G: 2 INJECTION, POWDER, FOR SOLUTION INTRAVENOUS at 06:59

## 2019-05-01 RX ADMIN — BETAMETHASONE ACETATE AND BETAMETHASONE SODIUM PHOSPHATE 12 MG: 3; 3 INJECTION, SUSPENSION INTRA-ARTICULAR; INTRALESIONAL; INTRAMUSCULAR; SOFT TISSUE at 00:44

## 2019-05-01 RX ADMIN — ACETAMINOPHEN 650 MG: 325 TABLET, FILM COATED ORAL at 20:58

## 2019-05-01 RX ADMIN — IBUPROFEN 800 MG: 400 TABLET ORAL at 22:31

## 2019-05-01 RX ADMIN — PRENATAL VIT W/ FE FUMARATE-FA TAB 27-0.8 MG 1 TABLET: 27-0.8 TAB at 10:17

## 2019-05-01 RX ADMIN — SODIUM CHLORIDE, POTASSIUM CHLORIDE, SODIUM LACTATE AND CALCIUM CHLORIDE 500 ML: 600; 310; 30; 20 INJECTION, SOLUTION INTRAVENOUS at 13:27

## 2019-05-01 NOTE — PROGRESS NOTES
"Went back in to check on patient and discuss her plan of care with her mom per patient and her mom's request.  Patient reports that since increasing the magnesium to 2.5g/hr and stopping her nifedipine she has had much fewer ctx. States \"I've probably only had 2-3 in the last few hours\". States that they seem to be lasting a bit longer when she gets them but are much less intense. She does have to stop and breathe through them a bit when she gets them but states they're much less intense than the few times she had them prior to her other hospitalizations.  Continues to have a little leaking of fluid but not much. No abdominal pain and good FM. Getting some loose stools but thinks it's all the softeners she got over the weekend when she was constipated.    Patient had a growth U/S done by radiology as MFM not available until Thursday. The EFW is 1369 grams which is <10%. All measurements were at 29-29.5 range though individual percentiles weren't provided so it appears to be a symmetric growth issue. Verbally the tech told the patient and RN that she had a 6/8 on a BPP but a formal BPP isn't documented in the notes. The total CIRILO was 2.4 so unclear if that's one pocket in which case she would technically qualify for score of 8/8. There also are no dopplers documented unfortunately. Placenta is fundal. Cervix length measurement unable/not done.    disucssed with Jake and Chely's mom that this U/S could be indicative of symmetric IUGR or it could be genetic/constitutional given that chely is only 4'11\" and quite petite. Given that CIRILO is no longer an indicator of placental function we will do dopplers with MFM on Thursday when we repeat a BPP. For now she is feeling normal fetal movement and the NST has been reactive.    There are periods of slightly less variability since magnesium dose increased but she is no longer having recurrent late decels since stopping the nifedipine. She will have an occasional " variable and rarely a late component now that lisset so infrequently.    Again discussed si/sx of chorio, abruption, fetal intolerance/distress or breakthrough labor all as signs of need for delivery. As long as reassuring fetal heart tones we can certainly try a vaginal delivery if needed since the recurrent late decels have resolved now. However if once she labors there are signs of intolerance would move to c/s.    We also addressed that if there is an IUGR issue on top of her PPROM that may also need to be factored in to delivery. This could however be genetic and that will be harder to assess with sureity. Her prior growth U/S was done on 4/10 and the overall EFW was 37% but all four individual parameters were only 13-18%. Will plan to do formal MFM consult with BPP and dopplers and possibly redo of the growth scan on Thursday.    Will continue with hydroxyzine 100mg at bedtime. Will do second BMZ around MN 4/30 into 5/1. Plan is to d/c mag 24 hours after second BMZ but will likely do it morning hours rather than at MN.    Continue latency abx at this time as well.

## 2019-05-01 NOTE — PROVIDER NOTIFICATION
Electronic page sent to Dr Carmona:    Can we order hydroxyzine 50mg at bedtime for pt for sleep? Thanks! Ines

## 2019-05-01 NOTE — PROGRESS NOTES
Called back page to L& D at 7:23, was informed no one paged me. Stayed on hold with no answer from anyone.

## 2019-05-01 NOTE — PLAN OF CARE
Pt having nausea, malaise, states feeling like she has the flu, weak and experiencing double vision. RN will notify MD.

## 2019-05-01 NOTE — PROGRESS NOTES
S: Pt doing better in the last hour or two but in the middle of the night was really nauseous and almost feverish feeling and dizzy. Mg level was drawn and was 7.7. The patient's mag was shut off at 6am and she was given calcium gluconate as well. All of the sx she was having are gone now. Never had issues tolerating 2g/hr but the 2.5g was just too much. Also took 50mg hydroxyzine before bed so may be the combo.  Hasn't felt a lot more ctx in the last 2 hours but a couple. Feeling like there's just a sensation vaginally now that she's more aware of. Baby is moving well.  Was having loose stools before she came in and now is constipated again b/c all bowel regimen meds were held. Hasn't gone for a couple of days and feels like her gas pains are hard to discern from ctx. Feels like she could go right now though so may try now.  Also has to void and that always makes her contract some as well. Definitely not frequent or painful ctx though. Has been really nervous these last 2 hours that she's off mag completely that she'll start to labor. Is willing to tolerate some of the side effects of magnesium but last night was just too much.    O: /75   Pulse 74   Temp 98.6  F (37  C) (Temporal)   Resp 20   LMP 2018   SpO2 99%         ABD:  Gravid, soft, non tender  Ext: nt, no edema  Ila:only irritability the last hour or so and maybe one ctx while talking to patient.  NST:was reactive just prior to my arrival and now audible fetal movement so repositioning monitors           Hemoglobin   Date Value Ref Range Status   2019 13.9 11.7 - 15.7 g/dL Final            Lab Results   Component Value Date    RH Pos 2019       A:  HD #3 in a  at 31+1 with PPROM with bicornuate uterus s/p     P: Patient is s/P BMZ x 2 on  and  and then repeat dose  and   Contractions has essentially stopped with magnesium during the day yesterday but then mag level came back high and patient didn't feel well  so mag stopped and reversed with calcium gluconate. Explained that mag level is not done in PPROM/PTL and rather titrated to sx. Her sx were clearly mag related and now that she feels better I would rather restart her at a lower dose and titrate to her side effects but ctx as well. Will restart mag now at 1.5g/hr and see how she does    Patient will see MFM tomorrow for at least BPP and dopplers and consult but may end up repeating her growth U/S with them. Her U/S yesterday showed symmetric IUGR at <10% but patient herself is quite small. Her growth U/S 3 weeks prior showed all individual parameters were 13-18% and overall EFW was 37% so overall weight percentile has declined but the individual ones aren't that dramatically different than they were    Plan was to d/c mag 24 hrs after last BMZ dose. However that is at MN. Will leave mag until tomorrow AM. Once off of that, if she has some infrequent ctx that are symptomatic we could consider restarting her nifedipine as long as it doesn't cause late decels again like presumably it was yesterday am. The lates all resolved with d/c of nifedipine. Patient's BP wasn't much lower on it as off of it and no lates noted on prior admission but along with mag and PPROM and nifedipine it may have all just been too much at once yesterday. Now fetal tracing is reactive    Will repeat CBC and do a CMP as well    Restart miralax    Plan another consult with NICU team as the original one was in the middle of the night right after PPROM and patient and  really don't remember much of what was discussed.

## 2019-05-01 NOTE — PLAN OF CARE
At 0720 report received from Ines REYES RN.  33 year old  patient is here for PROM.  History of marginal cord insertion and bicornuate uterus.  GBS pending.  Cervical exam of 1 cm per speculum exam.  Assessment and vitals within normal limits.  Patient states feeling much better after calcium glucanate treatment.  Patient is resting quietly in bed.  Plans epidural for pain relief when in labor.  Support and reassurance provided.  Patient and family educated on plan of care.  Denies any questions at this time.   Jaya at bedside.      0809 - Dr Ireland at bedside.  Plan to re-start magnesium at 1.5 g/hr and continue to monitor contractions and FHR closely.  1006 - Phone call with Dr Ireland.  Updated on increase contraction pattern of 6-7 contractions in the last hour with patient reporting increased intensity.  Plan to turn magnesium up to 2 g/hr and continue to monitor contraction pattern.  If contractions continue in the next 2 hours, verbal okay from Dr Ireland to increase magnesium to 2.5 g/hr for a short time to help with contraction pattern.   1217 - Electronic page to Dr Ireland.  Updated on contraction pattern, patient complaints of increased pain with some pressure, and increase rate of the magnesium sulfate to 2.5 g/hr.   1303 - Electronic page to Dr Ireland.  Updated on increased pain and contractions.    1310 - Phone call with Dr Ireland. Updated on patient discomfort and breathing through contractions.  Plan to administer 20 mg of nifedipine and administer a LR bolus of 500 ml.    1345 - Patient feeling pressure and has the urge to push. SVE /0-+1.  Page to Dr Ireland to come for delivery, patient moved to labor room and prepared for delivery, and NICU team called for delivery.    1350 - Dr Ireland at bedside.   1400 - Complete  1422 - Delivery of baby boy  1428 - Delivery of placenta, pitocin infusing wide open per Dr Ireland verbal order.   1510 - Report given to KENDRA Ingram.

## 2019-05-01 NOTE — PROVIDER NOTIFICATION
Notified Dr. Carmona about pt's symptoms and that Stat Mag level was released. Per MD stop magnesium drip and give 1 gram of calcium gluconate. Also call her with magnesium level if results available before 0800. Per MD, if results available after 0800 call Dr Ireland.

## 2019-05-01 NOTE — PLAN OF CARE
Pt reports not feeling nausea, not feeling weak, no flu-like symptoms. RN notices stronger grasp, normal reflexes, no clonus.

## 2019-05-01 NOTE — L&D DELIVERY NOTE
Delivery Date:  2019      HISTORY OF PRESENT ILLNESS:  Annie is a 33-year-old  1, para 0, whose pregnancy was followed by myself.  The patient is blood type B positive.  She is rubella immune and she is group B strep unknown.  The patient did not get genetic testing done.  She did have a Tdap and flu shots during her pregnancy, and she had a normal 1-hour GCT.  The patient had several factors complicating pregnancy.  The first is that she had a known bicornuate uterus with the pregnancy in the left horn.  She also had a marginal cord insertion.  Because of the marginal cord insertion, the patient was getting serial growth ultrasounds and the most recent one has been done on 04/10 and was normal.  The patient actually had 2 hospitalizations prior to this current one for  contractions.  The first was at around 27 weeks, at which time she went to [facility] because the [facility] was closed and [facility] only had a level 2 NICU.  The patient's cervix was closed.  Her fetal fibronectin was negative, but she was lisset very regularly and painfully.  She was hospitalized for 2 days on magnesium and received 2 doses of betamethasone on  and .  After the 48 hours on magnesium her contractions all stopped and she was discharged home.  The patient then within a week started to have some  contractions again.  She was seen just short of 2 weeks from her hospitalization and another fetal fibronectin was done and was again negative, but the patient was having very painful symptomatic contractions and at that point was started on 10 mg of oral nifedipine every 6 hours.  This initially worked well along with some Vistaril at night for sleep and she had about a week and a half without much in the way of contractions and then was again readmitted for symptomatic painful  contractions happening regularly and with vomiting on  through .  She was again placed on  magnesium, but because the last betamethasone was only 2-1/2 weeks prior, that was not repeated.  We also then increased her to 20 mg every 6 hours on nifedipine, and once we transitioned her off magnesium her contractions did all stop and she was able to discharge home.        The patient was home for approximately a day and half and was not actually having that many contractions and then had spontaneous rupture of membranes at 30 weeks 5 days, which was on 04/29 at 2130.  She presented back to the hospital that evening and was confirmed to be ruptured.  She was started on latency antibiotics and again restarted on magnesium.  She was given another dose of betamethasone and admitted.  The patient eventually was bumped up to 2.5 g of magnesium an hour and this did finally stop her contractions.  Between the PPROM, the magnesium, and the nifedipine, the baby was having category 1 tracing, but with each contraction was having a subtle late decels.  We thought that it may be a combination of lowered blood pressure from the nifedipine with the other factors, so we actually discontinued the nifedipine the morning of 04/30.  She did not have any contractions and all of the late decelerations stopped.  The patient then received a second dose of betamethasone right around midnight, 04/30 into 05/01, and then at approximately 5-6 a.m. began to feel very sick with her magnesium.  At that point, the magnesium level was found to be 7.7 and it was ordered that her magnesium be stopped, and she actually got a dose of calcium gluconate.  This all happened at approximately 6 a.m., and upon my evaluation of her this morning at 8 a.m. she was starting to have some more contractions, although all of the side effects from magnesium had actually resolved.  We decided to restart the magnesium to try to get her to 24 hours from this second dose.  We started her back at 1.5 g an hour and after 2 hours increased back to 2 g an hour, and  then by 10:00 in the morning the patient was lisset quite regularly every 5-6 minutes.  We did try to do one additional bump to 2.5 g an hour, but the patient continued to contract through this.  She then was basically documented to be in active labor, right around 10:00 in the morning, but was not checked until right around 1330 and was found to be 8-9 cm at that time.  The magnesium was obviously discontinued, and I was called at 1345.        Upon my arrival, the patient was approximately 9 cm and was unmedicated, so was somewhat grunting and pushing through contractions even though the cervix was there.  After about 3 of these, I checked her and she just had an anterior lip.  I actually reduce this anterior lip during a contraction and she was able to push past it.  The patient then pushed with the NICU team standing by and delivered a viable male infant in the left occiput anterior position at 1422.  His Apgars are unavailable at the time of this dictation and his weight was 3 pounds 5 ounces.  There was a nuchal cord x 1 that was reduced once he was already delivered.  His cord was doubly clamped and cut immediately and he was handed off to the NICU team.  The patient's placenta then delivered with just some fundal massage on the left horn at 1428.  The placenta was intact with a normal 3-vessel cord with definitely a marginal cord insertion.  The patient actually had very minimal bleeding and her estimated blood loss is pending at the time of this dictation, but was actually quite mild.  She did sustain a second-degree perineal laceration.  This was injected with approximately 7-8 mL of 1% plain Polocaine and then repaired in the normal fashion with 3-0 Vicryl suture.      The patient tolerated everything very well and was doing well immediately after delivery.  The infant was taken to the NICU but only on CPAP and actually doing quite well for 31 weeks' gestation.      STAGES OF LABOR:  First stage 4  hours, second stage 22 minutes, third stage 6 minutes for a total of 4 hours and 28 minutes.      DIAGNOSES:   1.  Intrauterine pregnancy with PPROM at 30+6.   2.  Spontaneous labor approximately 48 hours after hospitalization, despite initially successful tocolysis.    3.  Normal spontaneous vaginal delivery of a viable male infant at 31 weeks plus 1 day gestation.   4.  Second-degree perineal laceration, repaired in the normal fashion.   5.  Bicornuate uterus.   6.  Marginal cord insertion.         TERA SCHAFFER MD             D: 2019   T: 2019   MT: MIRIAM      Name:     BRADEN FORTUNE   MRN:      [id number]        Account:        [id number]   :      1985        Delivery Date:  2019               Document: [id number]

## 2019-05-01 NOTE — PLAN OF CARE
PPROM pt with scant fluid. Vital signs stable. Developing mild generalized and pitting edema in ankles and feet overnight. Pt experiencing cramping during the evening hours that evolved into occasional contractions. Contraction's level of discomfort increased from 3/10 to 5/10, per pt coping with pain since contractions are so far apart. Breathing through contractions but able to talk through them. Experiencing blurry vision and weakness intermittently. Denies headache, no clonus, normal reflexes. Magnesium gtt and LR infusing. FHR with moderate variability, +accel, occasional variable or late decel with contractions.  Pt able to sleep in the latter part of the night.

## 2019-05-01 NOTE — PLAN OF CARE
Pt assessed:  Lung sounds clear, equal bilaterally  RR 20  Normal reflexes  No clonus  Weak grasp  Vital signs stable

## 2019-05-01 NOTE — L&D DELIVERY NOTE
@ 31+1 of viable male at 1422   Baby's weight 3lbs. 5oz.     Apgars unavailable     Clear AF  2nd degree la repaired with 3-0 vicryl after injection of 7cc of 1% polocaine  Baby to NICU o CPAP    Primary OB: Shu

## 2019-05-02 LAB — HGB BLD-MCNC: 12.4 G/DL (ref 11.7–15.7)

## 2019-05-02 PROCEDURE — 25000132 ZZH RX MED GY IP 250 OP 250 PS 637: Performed by: OBSTETRICS & GYNECOLOGY

## 2019-05-02 PROCEDURE — 36415 COLL VENOUS BLD VENIPUNCTURE: CPT | Performed by: OBSTETRICS & GYNECOLOGY

## 2019-05-02 PROCEDURE — 85018 HEMOGLOBIN: CPT | Performed by: OBSTETRICS & GYNECOLOGY

## 2019-05-02 PROCEDURE — 12000035 ZZH R&B POSTPARTUM

## 2019-05-02 RX ADMIN — ACETAMINOPHEN 650 MG: 325 TABLET, FILM COATED ORAL at 07:06

## 2019-05-02 RX ADMIN — IBUPROFEN 800 MG: 400 TABLET ORAL at 23:46

## 2019-05-02 RX ADMIN — ACETAMINOPHEN 650 MG: 325 TABLET, FILM COATED ORAL at 01:24

## 2019-05-02 RX ADMIN — IBUPROFEN 800 MG: 400 TABLET ORAL at 10:55

## 2019-05-02 RX ADMIN — ACETAMINOPHEN 650 MG: 325 TABLET, FILM COATED ORAL at 10:55

## 2019-05-02 RX ADMIN — IBUPROFEN 800 MG: 400 TABLET ORAL at 16:22

## 2019-05-02 RX ADMIN — IBUPROFEN 800 MG: 400 TABLET ORAL at 04:26

## 2019-05-02 RX ADMIN — ACETAMINOPHEN 650 MG: 325 TABLET, FILM COATED ORAL at 15:14

## 2019-05-02 RX ADMIN — ACETAMINOPHEN 650 MG: 325 TABLET, FILM COATED ORAL at 23:46

## 2019-05-02 RX ADMIN — SENNOSIDES AND DOCUSATE SODIUM 1 TABLET: 8.6; 5 TABLET ORAL at 08:07

## 2019-05-02 RX ADMIN — ACETAMINOPHEN 650 MG: 325 TABLET, FILM COATED ORAL at 20:10

## 2019-05-02 NOTE — PLAN OF CARE
Patient arrived in room 4414 around 1640.  Vital signs are stable.  Able to void and empty her bladder.  She started to pump every 3 hours right away.  Visits NICU via wheel chair.  She has been oriented to room, call light system.  Educational folder has been introduced to patient.  Patient is encouraged to call whenever she has question and concerns.  Will continue to monitor.

## 2019-05-02 NOTE — PROGRESS NOTES
[facility]    Post-Partum Progress Note    Assessment & Plan   Assessment:  Post-partum day #1  Normal spontaneous vaginal delivery   of 31+1wk infant    Doing well.  No excessive bleeding  Pain well-controlled.    Plan:  Ambulation encouraged  Hemoglobin in AM  Lactation consultation  Pain control measures as needed  Reportable signs and symptoms dicussed with the patient  Anticipate discharge tomorrow    Sheila Weller     Interval History   Doing well.  Pain is well-controlled --some cramping.  No fevers.  No history of foul-smelling vaginal discharge.  Good appetite.  Denies chest pain, shortness of breath, nausea or vomiting.  Vaginal bleeding is similar to a heavy menstrual flow --slowed this AM.  Ambulatory. Baby doing well in NICU on CPAP    Medications     - MEDICATION INSTRUCTIONS -       NO Rho (D) immune globulin (RhoGam) needed - mother Rh POSITIVE       - MEDICATION INSTRUCTIONS -       oxytocin in 0.9% NaCl       oxytocin in 0.9% NaCl 340 mL/hr (19 1500)       senna-docusate  1 tablet Oral BID    Or     senna-docusate  2 tablet Oral BID       Physical Exam   Temp: 98  F (36.7  C) Temp src: Oral BP: 112/75 Pulse: 82 Heart Rate: 70 Resp: 16        There were no vitals filed for this visit.  Vital Signs with Ranges  Temp:  [97.6  F (36.4  C)-99.1  F (37.3  C)] 98  F (36.7  C)  Pulse:  [] 82  Heart Rate:  [69-70] 70  Resp:  [16-18] 16  BP: (109-131)/(64-77) 112/75  I/O last 3 completed shifts:  In: 4662 [P.O.:500; I.V.:4162]  Out: 1708 [Urine:1550; Blood:158]    Uterine fundus is firm, non-tender and at the level of the umbilicus  Extremities Non-tender    Data   Recent Labs   Lab Test 19  0010   ABO B   RH Pos   AS Neg     Recent Labs   Lab Test 19  0922 19  001   HGB 13.2 13.9     Recent Labs   Lab Test 18  1454   RUQIGG 53

## 2019-05-02 NOTE — PLAN OF CARE
VSS. Fundus firm with scant flow. Pumping every 3 hours. Pain managed with Tylenol and Ibuprofen. Pt mentioned she has a rash from the monitor bands used in labor as she stated she has sensitive skin. Will continue to monitor.

## 2019-05-02 NOTE — PLAN OF CARE
Vitals stable. Up ad jimmie well. Voiding without difficulty. Pumping every 3 hours. Visits infant in NICU. Pain controlled with tylenol and ibuprofen. Depression screen completed. Birth certificate form completed. Aqua K pad to abdomen PRN.

## 2019-05-02 NOTE — LACTATION NOTE
Initial visit. Infant is in NICU at 31+ weeks of gestation.  Mother is pumping every 3 hours.  Discussed strict schedule of pumping, pumping at home and in hospital, hands free pumping bra, hand massage, hand expression, take home pump, and nipple cream.  Mother is allergic to lanolin, discussed other options to use.  Encouraged Mother to view the [place] breast feeding, pumping, and hand expression videos.  Breastfeeding general information reviewed.  Encouraged frequent visits to NICU.  No further questions at this time. Will follow as needed.   Desiree Brown RN, IBCLC

## 2019-05-03 VITALS
OXYGEN SATURATION: 100 % | RESPIRATION RATE: 16 BRPM | HEART RATE: 87 BPM | TEMPERATURE: 97.9 F | SYSTOLIC BLOOD PRESSURE: 111 MMHG | DIASTOLIC BLOOD PRESSURE: 70 MMHG

## 2019-05-03 LAB
CALCIUM SERPL-MCNC: 8.5 MG/DL (ref 8.5–10.1)
COPATH REPORT: NORMAL

## 2019-05-03 PROCEDURE — 25000132 ZZH RX MED GY IP 250 OP 250 PS 637: Performed by: OBSTETRICS & GYNECOLOGY

## 2019-05-03 PROCEDURE — 36415 COLL VENOUS BLD VENIPUNCTURE: CPT | Performed by: OBSTETRICS & GYNECOLOGY

## 2019-05-03 PROCEDURE — 82310 ASSAY OF CALCIUM: CPT | Performed by: OBSTETRICS & GYNECOLOGY

## 2019-05-03 RX ADMIN — ACETAMINOPHEN 650 MG: 325 TABLET, FILM COATED ORAL at 06:03

## 2019-05-03 RX ADMIN — IBUPROFEN 800 MG: 400 TABLET ORAL at 06:03

## 2019-05-03 RX ADMIN — ACETAMINOPHEN 650 MG: 325 TABLET, FILM COATED ORAL at 12:06

## 2019-05-03 RX ADMIN — IBUPROFEN 800 MG: 400 TABLET ORAL at 12:06

## 2019-05-03 RX ADMIN — SENNOSIDES AND DOCUSATE SODIUM 1 TABLET: 8.6; 5 TABLET ORAL at 07:47

## 2019-05-03 NOTE — PLAN OF CARE
VSS. Voiding without difficulty. Scant vaginal bleeding. Pain controlled with ibuprofen and tylenol. Pumping q3hrs and visiting infant in NICU.  at bedside, supportive and involved in cares. Will continue to monitor.

## 2019-05-03 NOTE — PLAN OF CARE
Vitals stable. Up ad jimmie well. Voiding without difficulty. Pain controlled with tylenol and ibuprofen. Pumping every 3 hours. Discharging home today. Infant to remain in NICU.

## 2019-05-03 NOTE — LACTATION NOTE
Routine visit with Annie and FOELIO.   Breastfeeding general information reviewed.   Advised to pump 8-12x/day. Yielding 1-2 ml.  31 weeks in NICU.  Information given to provide comfort during engorgement.  Sore nipple shells given.    Explained benefits of holding and skin to skin.  Encouraged lots of skin to skin. Instructed on hand expression. Questions answered regarding pumping and physiology of milk supply and production. No further questions at this time.   Will follow as needed.   Madison INIGUEZN, RN, PHN, RNC-MNN, IBCLC

## 2019-05-03 NOTE — PLAN OF CARE
Discharge instructions discussed with pt and spouse who verbalize understanding. No discharge prescriptions. Breast pump given for home. Ready for discharge home. Infant in NICU.

## 2019-05-03 NOTE — PLAN OF CARE
Vitals signs  are stable. Up ad jimmie well. Voiding without difficulty. Pumping every 3 hours. Visits infant in NICU. Pain controlled with tylenol and ibuprofen. Depression screen completed. Birth certificate form completed. Watched DVD.  Aqua K pad to abdomen PRN.  Will continue to monitor.

## 2019-05-03 NOTE — PROGRESS NOTES
S: Pt doing well in the NICU on cpap. Came off cpap for 3 hours yesterday but then fatigued and back on. Hoping to try him off again later today. Infant is being fed by IV and starting to get some EBM.  Patient is pumping and is getting some colustrum. A little more yesterday than today but pumping every 3 hours. Pain is controlled with current analgesics.  Medication(s) being used: acetaminophen and ibuprofen (OTC). +BM x2    O: /70   Pulse 87   Temp 97.9  F (36.6  C) (Oral)   Resp 16   LMP 09/25/2018   SpO2 100%   Breastfeeding? Unknown         ABD:  Uterine fundus is firm, non-tender and at the level of the umbilicus                Hemoglobin   Date Value Ref Range Status   05/02/2019 12.4 11.7 - 15.7 g/dL Final            Lab Results   Component Value Date    RH Pos 04/30/2019       A:  Post-partum day #2 s/p Normal spontaneous vaginal delivery @ 31+1    P: Continue PP Cares  discharge home and f/u in 6 weeks

## 2019-05-07 ENCOUNTER — TELEPHONE (OUTPATIENT)
Dept: OBGYN | Facility: CLINIC | Age: 34
End: 2019-05-07

## 2019-05-07 DIAGNOSIS — R60.0 PERIPHERAL EDEMA: ICD-10-CM

## 2019-05-07 NOTE — TELEPHONE ENCOUNTER
19 at 30w6d. Pt's mother here at the clinic requesting rx for knee high teds. Pt has significant swelling in her legs from bedrest and fluids given. Baby is in NICU. Rx printed and sent to [facility], [place]. Pt's mother informed.

## 2019-05-07 NOTE — TELEPHONE ENCOUNTER
Patient's mother calling - insurance denied coverage for compression stockings due to coding. Please call back to go over insurance guidelines that were given to pt by insurance.

## 2019-05-07 NOTE — TELEPHONE ENCOUNTER
Pt having cold symptoms, afebrile, mostly nasal congestion. Reviewed OTC meds she can take. Pt having some BLE swelling, would like to try compression stockings. Advised ok to use, also elevate when possible. Discussed sometimes swelling can get a little worse before it gets better. Pt will call back if it does not start to resolve in a few days. No other concerns.  Crystal De Guzman RN on 5/7/2019 at 10:00 AM

## 2019-05-08 ENCOUNTER — TELEPHONE (OUTPATIENT)
Dept: OBGYN | Facility: CLINIC | Age: 34
End: 2019-05-08

## 2019-05-08 NOTE — TELEPHONE ENCOUNTER
New Rx sent to Dr. Ireland to review and sign.       Indications that are covered  1. Must be part of a treatment plan for venous insufficiencies, or to decrease contractures related to burns, which could result in a physical functional loss.   2. Conditions for which compression support garments (ready or custom made) may be indicated include, but are not limited to, the followin. Varicose veins   2. Venous ulcers (also referred to as stasis ulcers)  3. Venous insufficiency   4. Stasis dermatitis   5. Peripheral edema   6. Varicosities of pregnancy   7. Orthostatic hypotension   8. Thrombophlebitis   9. Lymphedema

## 2019-05-08 NOTE — TELEPHONE ENCOUNTER
Spoke to pt's mother, pt will call insurance today and get Dx code that would work for compression stockings. Message sent to pt's mothers email, [email or web address].

## 2019-05-11 ENCOUNTER — LACTATION ENCOUNTER (OUTPATIENT)
Age: 34
End: 2019-05-11

## 2019-05-11 NOTE — LACTATION NOTE
This note was copied from a baby's chart.  Follow-up visit. Mom is faithfully pumping q3h and getting good volumes but not keeping track. Encouraged to monitor volumes so when able to decrease to 7 pumpings per day she can continue to follow her volumes closely (hand outs given). Also encouraged to massage and compress at the end of pumping to include the last drops of hind milk. She does skin to skin 2-3 times per day and has not noticed cueing or oral interest. She is using [employer] for tender nipples. Will continue to follow when ready to attempt suckling at empty breast. N Day RN IBCLC

## 2019-05-13 ENCOUNTER — HOSPITAL ENCOUNTER (OUTPATIENT)
Facility: CLINIC | Age: 34
End: 2019-05-13
Payer: COMMERCIAL

## 2019-05-16 ENCOUNTER — TELEPHONE (OUTPATIENT)
Dept: OBGYN | Facility: CLINIC | Age: 34
End: 2019-05-16

## 2019-05-16 NOTE — TELEPHONE ENCOUNTER
"Pt has been wearing compression stocking during the day-swelling has improved quite a bit.   Still some mild swelling in her thighs-is getting better not worse. Facial swelling completely gone.  Encouraged pt to stay hydrated, mild activity is good, call if swelling does not get continue to get better.   Pt also had a \"feeling in her bottom like it was dropping\" this sensation happened randomly twice over a few days. Denies hemorrhoids or constipation. Is taking miralax has not happened again. Otherwise feels she is feeling good since her delivery. Pt will call back with any further questions or concerns  Crystal De Guzman RN on 5/16/2019 at 3:05 PM                      "

## 2019-05-24 ENCOUNTER — LACTATION ENCOUNTER (OUTPATIENT)
Age: 34
End: 2019-05-24

## 2019-05-24 NOTE — LACTATION NOTE
This note was copied from a baby's chart.  Reviewed lactation information with Mom. Annie states she is getting at least 725 mls/day pumping and pumping 8 times/day and logging what she gets. Discussed beginning signs of getting ready to breastfeed. Gave her a handout from [employer] of breastfeeding your baby in the NICU. Encouraged Mom to put Harsha to breast during NT feeding and allow him to nuzzle and do brief attempts without pushing. Encouraged Mom that she is doing a good job which she appreciated.

## 2019-06-06 ENCOUNTER — LACTATION ENCOUNTER (OUTPATIENT)
Age: 34
End: 2019-06-06

## 2019-06-06 NOTE — LACTATION NOTE
This note was copied from a baby's chart.  Routine visit with Annie.  Answered questions regarding bottle with OT and breastfeeding.  Baby cuing score high and low .  Encouraged to have baby be evaluated and then bring baby to breast with shield.   No further questions at this time. Madison INIGUEZN, RN, PHN, RNC-MNN, IBCLC

## 2019-06-12 ENCOUNTER — PRENATAL OFFICE VISIT (OUTPATIENT)
Dept: OBGYN | Facility: CLINIC | Age: 34
End: 2019-06-12
Payer: COMMERCIAL

## 2019-06-12 ENCOUNTER — RESULT FOLLOW UP (OUTPATIENT)
Dept: OBGYN | Facility: CLINIC | Age: 34
End: 2019-06-12

## 2019-06-12 VITALS — DIASTOLIC BLOOD PRESSURE: 68 MMHG | WEIGHT: 113 LBS | SYSTOLIC BLOOD PRESSURE: 120 MMHG | BODY MASS INDEX: 22.07 KG/M2

## 2019-06-12 DIAGNOSIS — B37.2 YEAST INFECTION OF THE SKIN: ICD-10-CM

## 2019-06-12 DIAGNOSIS — R87.810 CERVICAL HIGH RISK HPV (HUMAN PAPILLOMAVIRUS) TEST POSITIVE: ICD-10-CM

## 2019-06-12 DIAGNOSIS — Q51.3 BICORNUATE UTERUS: ICD-10-CM

## 2019-06-12 DIAGNOSIS — Z30.011 OCP (ORAL CONTRACEPTIVE PILLS) INITIATION: ICD-10-CM

## 2019-06-12 PROBLEM — O47.00 PRETERM CONTRACTIONS: Status: RESOLVED | Noted: 2019-04-25 | Resolved: 2019-06-12

## 2019-06-12 PROBLEM — O34.00: Status: RESOLVED | Noted: 2018-12-06 | Resolved: 2019-06-12

## 2019-06-12 PROBLEM — O26.90 PREGNANCY RELATED CONDITION: Status: RESOLVED | Noted: 2019-04-25 | Resolved: 2019-06-12

## 2019-06-12 PROBLEM — O09.90 PREGNANCY, SUPERVISION, HIGH-RISK: Status: RESOLVED | Noted: 2018-12-06 | Resolved: 2019-06-12

## 2019-06-12 PROBLEM — Z34.00 SUPERVISION OF NORMAL IUP (INTRAUTERINE PREGNANCY) IN PRIMIGRAVIDA: Status: RESOLVED | Noted: 2018-12-03 | Resolved: 2019-06-12

## 2019-06-12 PROBLEM — O60.00 PRETERM LABOR: Status: RESOLVED | Noted: 2019-04-18 | Resolved: 2019-06-12

## 2019-06-12 PROBLEM — Z36.89 ENCOUNTER FOR TRIAGE IN PREGNANT PATIENT: Status: RESOLVED | Noted: 2019-04-29 | Resolved: 2019-06-12

## 2019-06-12 PROCEDURE — 99207 ZZC POST PARTUM EXAM: CPT | Performed by: OBSTETRICS & GYNECOLOGY

## 2019-06-12 PROCEDURE — G0145 SCR C/V CYTO,THINLAYER,RESCR: HCPCS | Performed by: OBSTETRICS & GYNECOLOGY

## 2019-06-12 PROCEDURE — 87624 HPV HI-RISK TYP POOLED RSLT: CPT | Performed by: OBSTETRICS & GYNECOLOGY

## 2019-06-12 PROCEDURE — 99213 OFFICE O/P EST LOW 20 MIN: CPT | Performed by: OBSTETRICS & GYNECOLOGY

## 2019-06-12 RX ORDER — ACETAMINOPHEN AND CODEINE PHOSPHATE 120; 12 MG/5ML; MG/5ML
0.35 SOLUTION ORAL DAILY
Qty: 90 TABLET | Refills: 3 | Status: SHIPPED | OUTPATIENT
Start: 2019-06-12 | End: 2020-10-07

## 2019-06-12 ASSESSMENT — ANXIETY QUESTIONNAIRES
6. BECOMING EASILY ANNOYED OR IRRITABLE: NOT AT ALL
IF YOU CHECKED OFF ANY PROBLEMS ON THIS QUESTIONNAIRE, HOW DIFFICULT HAVE THESE PROBLEMS MADE IT FOR YOU TO DO YOUR WORK, TAKE CARE OF THINGS AT HOME, OR GET ALONG WITH OTHER PEOPLE: NOT DIFFICULT AT ALL
3. WORRYING TOO MUCH ABOUT DIFFERENT THINGS: NOT AT ALL
7. FEELING AFRAID AS IF SOMETHING AWFUL MIGHT HAPPEN: NOT AT ALL
2. NOT BEING ABLE TO STOP OR CONTROL WORRYING: NOT AT ALL
5. BEING SO RESTLESS THAT IT IS HARD TO SIT STILL: NOT AT ALL
GAD7 TOTAL SCORE: 0
1. FEELING NERVOUS, ANXIOUS, OR ON EDGE: NOT AT ALL

## 2019-06-12 ASSESSMENT — PATIENT HEALTH QUESTIONNAIRE - PHQ9
SUM OF ALL RESPONSES TO PHQ QUESTIONS 1-9: 0
5. POOR APPETITE OR OVEREATING: NOT AT ALL

## 2019-06-12 NOTE — PROGRESS NOTES
SUBJECTIVE:  Annie Clark,  is here for a postpartum visit.  She had a  on 19 delivering a healthy baby boy, named Harsha weighing 3 lbs 5 oz at term.      HPI:  Patient is doing well herself. Had a hard time the first 2 weeks after delivery with her mood and hormones and baby in the NICU but now is much better.  Baby is still in the NICU but he's now 6#. Still has a feeding tube so doing a lot of feeding through that. Needs 80% of feeding to be breastfeeding to take out tube and at least 90% to be that to go home.  Just started to give him a bottle of her breast milk and he's taking that  He's latching fine but not much stamina yet to do a good full nursing session  Pumping most of the time, using nipple shields. Just started in the alst couple of days to get a rash around her areola. Has very sensitive skin in general. It's itching. Right worse than left but on both.  2 times right after going home would get out of shower and had a horrible rash on upper/lower legs but then would go away within a few hours. No where else. Only using vanicream. Has pic on phones and has a very extensive red maculopapular rash on her thigh. Hasn't had It in weeks now    Wanted to review her pregnancy, what we would do in future given PTL and PTD and her bicornuate uterus. Wanted to discuss meds, bedrest need, timing of conceiving to have a baby at a certain time of year.    Would like contraception but not sure what.    Had a notebook of several questions to review.    Last PHQ-9 score on record=   PHQ-9 SCORE 2019   PHQ-9 Total Score 0     ROBERT-7 SCORE 2017 12/3/2018 2019   Total Score 0 0 0       Delivery complications:  Yes, PROM,   Breast feeding:  Yes, some supplementing   Bladder problems:  No  Bowel problems/hemorrhoids:  No  Episiotomy/laceration/incision healed? 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    OBJECTIVE:  Vitals: /68   Wt 51.3 kg (113 lb)   LMP 09/25/2018   Breastfeeding? Yes   BMI 22.07 kg/m    BMI= Body mass index is 22.07 kg/m .  General - pleasant female in no acute distress.  Breast -  symmetric, no puckering and HAS A NONCOALESCING PAPULAR MILLIARY RED RASH ON HER RIGHT BREAST AT 12 AND 9 OCLOCK OUTSIDE OF AREOLA. ON THE LEFT THERE ARE JUST A FEW RED BUMPS STARTING TO FORM.  Abdomen - No incision  Pelvic - EG: normal adult female, BUS: within normal limits, Vagina: well rugated, no discharge, Cervix: no lesions or CMT, Uterus: firm, normal sized and nontender, TOWARDS HER RIGHT IT FEELS ANTEFLEXED AND ON HER LEFT IT FEELS RETROFLEXED in position. Adnexae: no masses or tenderness.  Rectovaginal - deferred.    ASSESSMENT:    ICD-10-CM    1. Routine postpartum follow-up Z39.2 Pap imaged thin layer screen with HPV - recommended age 30 - 65     HPV High Risk Types DNA Cervical   2. OCP (oral contraceptive pills) initiation Z30.011 norethindrone (MICRONOR) 0.35 MG tablet   3. Yeast infection of the skin B37.2 APNO CREA ointment   4. Bicornuate uterus Q51.3        PLAN:  May resume normal activities without restrictions.  Pap smear was done today.    Full counseling was provided, and all questions were answered.   Return to clinic in one year for an annual visit.     Discussed different contraception options. IUD not for her given uterine shape, wouldn't do estrogen b/c of nursing and would like to try for kids within 12-14 months. Will do minipill for now as depo and nexplanon are a bigger commitment. When done nursing can call and either stop ocps, swtich to a combo ocp, or stay on leigh with lesser effectiveness if comfortable with that. Discussed all the r/b/a.    Spent about 45 min total with the patient reviewing her PTL  and PPROM, the bicornuate uterus, hospital and home bedrest. Discussed that she'd certainly get ariel, would likely plan cervical length U/S and could do vaginal progesterone as well. Discussed preconception consult with MFM or a consult right when conceives. Will call when she decides which of those she wants.    Patient seems to have had an extreme allergic reaction on her legs but seems like a contact dermatitis. Doesn't seem similar to her breast rash which has more of a yeast appearance to it. Will rx APNO cream and will use that after each nursing session for 1-2 weeks and wipe it off well before nursing/pumping. If not improved will contact me.    Tana Ireland MD

## 2019-06-13 ASSESSMENT — ANXIETY QUESTIONNAIRES: GAD7 TOTAL SCORE: 0

## 2019-06-17 LAB
COPATH REPORT: NORMAL
PAP: NORMAL

## 2019-06-18 PROBLEM — R87.810 CERVICAL HIGH RISK HPV (HUMAN PAPILLOMAVIRUS) TEST POSITIVE: Status: ACTIVE | Noted: 2019-06-12

## 2019-06-18 LAB
FINAL DIAGNOSIS: ABNORMAL
HPV HR 12 DNA CVX QL NAA+PROBE: POSITIVE
HPV16 DNA SPEC QL NAA+PROBE: POSITIVE
HPV18 DNA SPEC QL NAA+PROBE: NEGATIVE
SPECIMEN DESCRIPTION: ABNORMAL
SPECIMEN SOURCE CVX/VAG CYTO: ABNORMAL

## 2019-06-18 NOTE — PROGRESS NOTES
6/12/19 NIL pap, + HPV 16 & other. Plan: colpo  06/19/19 LM for pt to call back (lizbet)   6/20/19 .left msg. mychart sent ([facility])  06/21/19 Pt notified (kevin)  8/22/19 Colpo: Bx-ANH 1.  Plan: cotest in 1 year (ana)

## 2019-08-20 NOTE — PROGRESS NOTES
INDICATIONS:                                                    Is a pregnancy test required: No.  Was a consent obtained?  Yes    Today's PHQ-2 Score:   PHQ-2 ( 1999 Pfizer) 11/30/2018   Q1: Little interest or pleasure in doing things 0   Q2: Feeling down, depressed or hopeless 0   PHQ-2 Score 0   Q1: Little interest or pleasure in doing things Not at all   Q2: Feeling down, depressed or hopeless Not at all   PHQ-2 Score 0     Today's PHQ-9 Score:    PHQ-9 SCORE 6/12/2019   PHQ-9 Total Score 0     Today's GAD7 Score: 0    Annie Clark, is a 34 year old female, who had a recent NIL pap.  HPV 16 & Other HR positive.  No prior history of abnormal pap. Here today for colposcopy. Discussed indication, risks of infection and bleeding.    Her last pap was   Lab Results   Component Value Date    PAP NIL 06/12/2019    .    PROCEDURE:                                                      Cervix is stained with acetic acid and viewed colposcopically. Squamocolumnar junction is visualized in it's entirety. acetowhite lesion(s) noted at 3 o'clock . Biopsy done Yes. Endocervical curretage Not Done         POST PROCEDURE:                                                      IMPRESSION: Patient tolerated procedure well    PLAN : Await the results of the biopsies.    She  tolerated the procedure well. There were no complications. Patient was discharged in stable condition.    Patient advised to call the clinic if excessive bleeding, pelvic pain, or fever.     Follow-up plan based on pathology results.    Discussed in detail that her pap is normal but has 2 strains of HPV. Has never had a pap with HPV testing as her last pap was before age 31 so didn't have it done.  With her  8 yrs now. This pap was done at PP exam. Discussed suppression vs resolution of HPV. Rates or progression and regression. Immune surveillance.    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  His mom had had some type of gyn cancer in her 30s but not sure what. Always thought her dad had prostate cancer but then her brother, who didn't get genetic testing, was just found to have testicular cancer. Now not sure if dad's was prostate or testicular. Patient was tested herself and known positive.  She is still pumping 5x/day so is on micronor. Hasn't had a period come back yet. Likely at least 1 more year until they try again for a baby but may even be 2 more years.   Plan was for formal genetic counseling next year so will f/u on that  Was told she should consider a hyst in her 40's so wondering about that    Discussed salpingectomy for contraception when done having kids and delaying BSO until as late as possible.  Discussed when BSO vs hyst/BSO would be indicated  In the meantime would do an ovulatory suppressing ocp when done nursing so a CARLIE like junel 1.5/30 that she was doing prior to the baby. Was doing it continuously so never got a period and would love to do that again.  Will call the office when she is done pumping and then can change her rx over  Will delay until done with kids and has formal genetic counseling if there is any place for pelvic U/S to screen for ovarian cancer though understands that is very low sensitivity and not routinely recommended.  Spent 15 min 100% of which was in face to face counseling discussing ovarian cancer and genetic mutation surveillance in addition to the colpo.    Tana Ireland MD

## 2019-08-21 ENCOUNTER — OFFICE VISIT (OUTPATIENT)
Dept: OBGYN | Facility: CLINIC | Age: 34
End: 2019-08-21
Payer: COMMERCIAL

## 2019-08-21 VITALS — BODY MASS INDEX: 22.46 KG/M2 | WEIGHT: 115 LBS

## 2019-08-21 DIAGNOSIS — R87.810 CERVICAL HIGH RISK HPV (HUMAN PAPILLOMAVIRUS) TEST POSITIVE: Primary | ICD-10-CM

## 2019-08-21 DIAGNOSIS — Z14.8 CARRIER OF HIGH RISK CANCER GENE MUTATION: ICD-10-CM

## 2019-08-21 PROCEDURE — 57455 BIOPSY OF CERVIX W/SCOPE: CPT | Performed by: OBSTETRICS & GYNECOLOGY

## 2019-08-21 PROCEDURE — 99213 OFFICE O/P EST LOW 20 MIN: CPT | Mod: 25 | Performed by: OBSTETRICS & GYNECOLOGY

## 2019-08-22 PROCEDURE — 88305 TISSUE EXAM BY PATHOLOGIST: CPT | Performed by: OBSTETRICS & GYNECOLOGY

## 2019-08-23 LAB — COPATH REPORT: NORMAL

## 2019-09-27 ENCOUNTER — TELEPHONE (OUTPATIENT)
Dept: OBGYN | Facility: CLINIC | Age: 34
End: 2019-09-27

## 2019-09-27 DIAGNOSIS — Z30.41 USES ORAL CONTRACEPTION: ICD-10-CM

## 2019-09-27 RX ORDER — NORETHINDRONE ACETATE AND ETHINYL ESTRADIOL .03; 1.5 MG/1; MG/1
1 TABLET ORAL DAILY
Qty: 84 TABLET | Refills: 3 | Status: SHIPPED | OUTPATIENT
Start: 2019-09-27 | End: 2020-10-07

## 2019-09-27 NOTE — TELEPHONE ENCOUNTER
Pt called asking to send a message letting Dr. Ireland or her team know that she is now done breast feeding and is ready to have the bc change as previously discussed. Per pt the plan was to change the bc to something stronger once she is done breast feeding and that she should just call once that has happened. Pt requests that the rx be sent to the [facility] in [place], ok to call pt on the primary number and ok to [facility] when rx is done

## 2019-09-27 NOTE — TELEPHONE ENCOUNTER
Left message to clarify the correct OCP and asked pt rto call back    Paige Bunch RN on 9/27/2019 at 10:43 AM

## 2019-09-27 NOTE — TELEPHONE ENCOUNTER
Routing to Dr. Ireland pt requesting OCP-done breastfeeding  Crystal De Guzman, RN on 9/27/2019 at 9:34 AM

## 2019-10-08 ENCOUNTER — TELEPHONE (OUTPATIENT)
Dept: OBGYN | Facility: CLINIC | Age: 34
End: 2019-10-08

## 2020-10-07 ENCOUNTER — OFFICE VISIT (OUTPATIENT)
Dept: OBGYN | Facility: CLINIC | Age: 35
End: 2020-10-07
Payer: COMMERCIAL

## 2020-10-07 VITALS
HEART RATE: 84 BPM | BODY MASS INDEX: 24.56 KG/M2 | DIASTOLIC BLOOD PRESSURE: 86 MMHG | SYSTOLIC BLOOD PRESSURE: 120 MMHG | HEIGHT: 59 IN | WEIGHT: 121.8 LBS

## 2020-10-07 DIAGNOSIS — Z01.419 ENCOUNTER FOR GYNECOLOGICAL EXAMINATION WITHOUT ABNORMAL FINDING: Primary | ICD-10-CM

## 2020-10-07 DIAGNOSIS — Z14.8 CARRIER OF HIGH RISK CANCER GENE MUTATION: ICD-10-CM

## 2020-10-07 DIAGNOSIS — N87.0 CIN I (CERVICAL INTRAEPITHELIAL NEOPLASIA I): ICD-10-CM

## 2020-10-07 DIAGNOSIS — Q51.3 BICORNUATE UTERUS: ICD-10-CM

## 2020-10-07 DIAGNOSIS — Z31.9 PROCREATIVE MANAGEMENT: ICD-10-CM

## 2020-10-07 PROCEDURE — 99395 PREV VISIT EST AGE 18-39: CPT | Performed by: OBSTETRICS & GYNECOLOGY

## 2020-10-07 PROCEDURE — 87624 HPV HI-RISK TYP POOLED RSLT: CPT | Performed by: OBSTETRICS & GYNECOLOGY

## 2020-10-07 PROCEDURE — 88175 CYTOPATH C/V AUTO FLUID REDO: CPT | Performed by: OBSTETRICS & GYNECOLOGY

## 2020-10-07 ASSESSMENT — MIFFLIN-ST. JEOR: SCORE: 1158.11

## 2020-10-07 ASSESSMENT — PATIENT HEALTH QUESTIONNAIRE - PHQ9
SUM OF ALL RESPONSES TO PHQ QUESTIONS 1-9: 0
5. POOR APPETITE OR OVEREATING: NOT AT ALL

## 2020-10-07 ASSESSMENT — ANXIETY QUESTIONNAIRES
7. FEELING AFRAID AS IF SOMETHING AWFUL MIGHT HAPPEN: NOT AT ALL
GAD7 TOTAL SCORE: 0
3. WORRYING TOO MUCH ABOUT DIFFERENT THINGS: NOT AT ALL
5. BEING SO RESTLESS THAT IT IS HARD TO SIT STILL: NOT AT ALL
IF YOU CHECKED OFF ANY PROBLEMS ON THIS QUESTIONNAIRE, HOW DIFFICULT HAVE THESE PROBLEMS MADE IT FOR YOU TO DO YOUR WORK, TAKE CARE OF THINGS AT HOME, OR GET ALONG WITH OTHER PEOPLE: NOT DIFFICULT AT ALL
6. BECOMING EASILY ANNOYED OR IRRITABLE: NOT AT ALL
2. NOT BEING ABLE TO STOP OR CONTROL WORRYING: NOT AT ALL
1. FEELING NERVOUS, ANXIOUS, OR ON EDGE: NOT AT ALL

## 2020-10-07 NOTE — PROGRESS NOTES
Annie is a 35 year old  female who presents for annual exam.     Besides routine health maintenance,  she would like to discuss family planning.    HPI:  The patient's PCP is  Tana Ireland MD.      Patient is doing overall well. 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 Likes it but certainly quite challening with all of the new regulations and PPE especially with special ed. Settled in more now and managing ok.  Son is home with her  during the day and then when she gets home, he works from home, so he logs on and does some work that he didn't get done during the day. Son was with her mom for his childcare until her mom had a surgery in January and is still recovering from that but getting better. Wasn't ready to take him on full time 5x/week so coming over a couple times a week for a few hours to help out and hoping she'll be ready to go back to watching him full time.    Has not been contracepting most recently. Periods are regular at 28 days, last 5. A little heavy for a day or two and some moderate cramps but tolerable. Starting to think about trying for a second but her  especially is really nervous about how everything went last time.  Patient with bicornuate uterus. Had PTL at 27 weeks and admitted 2x and ended up delivering at just past 30 weeks as soon as her mag was turned down even a little. Broke through with ctx that had been almost gone on 2g mag and when down to 1g contractions ramped up very quickly and delivery very quickly thereafter. Was in the NICU for a while but now is meeting all milestones and doing completely fine w/o any issues that they can tell for his age. Wondering what we would do next pregnancy to help decrease recurrence of PTL/PTD    Pap  was nil but HPV 16 and other positive. Had a cvx bx at 3 oclock that was condyloma/ANH I. This is first pap follow up since that.    No fasting labs found in  "recent years. Not fasting this afternoon.  Has a BRIP-1 mutuation of unknown clinical significance. Lots of prostate cancer in her dad, brother and one GM with uterine cancer at a younger age. No breast or ovarian or other Mx    GYNECOLOGIC HISTORY:    Patient's last menstrual period was 2020.    Regular menses? yes  Menses every 28 days.  Length of menses: 5 days    Her current contraception method is: none.  She  reports that she has never smoked. She has never used smokeless tobacco.    Patient is sexually active.  STD testing offered?  Declined  Last PHQ-9 score on record =   PHQ-9 SCORE 10/7/2020   PHQ-9 Total Score 0     Last GAD7 score on record =   ROBERT-7 SCORE 10/7/2020   Total Score 0     Alcohol Score = 2    HEALTH MAINTENANCE:  Cholesterol: (No results found for: CHOL)  Last Mammo: Not applicable, Next Mammo: Due at age 41   Pap: HPV + 16 DNA & OTHER HR  Lab Results   Component Value Date    PAP NIL 2019    PAP NIL 10/30/2015      Colonoscopy:  NA, Next Colonoscopy: AGE 46.  Dexa:  NA    Health maintenance updated:  no, due for a pap smear.     HISTORY:  OB History    Para Term  AB Living   1 1 0 1 0 1   SAB TAB Ectopic Multiple Live Births   0 0 0 0 1      # Outcome Date GA Lbr Leeroy/2nd Weight Sex Delivery Anes PTL Lv   1  05// 31w1d 04:00 / 00:22 1.5 kg (3 lb 4.9 oz) M Vag-Spont Local Y BINA      Birth Comments: FOLLOWED AND delivered by ghada. PTL admit x2 at 27 weeks and then at 30 weeks. then PPROM 30+6 and kicked in to labor at 31+1. 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     Past Surgical History:   Procedure Laterality Date     C TRANSNASAL EUSTACH TUBE INFLATE,CATH       IP DIET ADULT PUREED       PARTIAL " "HYMENECTOMY/REVISION HYMENAL RING  3/5/08    Done by Himanshu     TONSILLECTADOLFO        Social History     Tobacco Use     Smoking status: Never Smoker     Smokeless tobacco: Never Used   Substance Use Topics     Alcohol use: No     Alcohol/week: 0.0 standard drinks      Problem (# of Occurrences) Relation (Name,Age of Onset)    Cancer (1) Paternal Grandmother: had uterus removed at age 30 but unsure if it was uterine cancer or something else    Diabetes (1) Maternal Grandfather    Hyperlipidemia (2) Father, Maternal Grandmother    Osteoporosis (2) Maternal Grandmother, Mother    Prostate Cancer (1) Paternal Grandfather:  from it in his 60s    Testicular cancer (2) Father (63): pt thought this was prostate cancer but then brother had it and realized it was testicular. dad is BRIP-1 carrier, Brother (35): no genetic testing    Thyroid Disease (2) Maternal Grandmother, Mother            Current Outpatient Medications   Medication Sig     BIOTIN PO      Cetirizine-Pseudoephedrine (ZYRTEC-D PO)      Prenatal Vit-Fe Fumarate-FA (PRENATAL 1+1 OR)      VITAMIN D PO      No current facility-administered medications for this visit.      Allergies   Allergen Reactions     Nuts Anaphylaxis and Nausea and Vomiting     Peas Nausea and Vomiting and Swelling     Thimerosal Other (See Comments) and Swelling     Dry skin, redness       Past medical, surgical, social and family histories were reviewed and updated in EPIC.    ROS:   12 point review of systems negative other than symptoms noted below or in the HPI.  No urinary frequency or dysuria, bladder or kidney problems, Normal menstrual cycles    EXAM:  /86 (BP Location: Right arm, Patient Position: Sitting, Cuff Size: Adult Regular)   Pulse 84   Ht 1.499 m (4' 11\")   Wt 55.2 kg (121 lb 12.8 oz)   LMP 2020   Breastfeeding No   BMI 24.60 kg/m     BMI: Body mass index is 24.6 kg/m .    PHYSICAL EXAM:  Constitutional:   Appearance: Well nourished, well developed, " alert, in no acute distress  Neck:  Lymph Nodes:  No lymphadenopathy present    Thyroid:  Gland size normal, nontender, no nodules or masses present  on palpation  Chest:  Respiratory Effort:  Breathing unlabored  Cardiovascular:    Heart: Auscultation:  Regular rate, normal rhythm, no murmurs present  Breasts: Palpation of Breasts and Axillae:  No masses present on palpation, no breast tenderness., Axillary Lymph Nodes:  No lymphadenopathy present. and No nodularity, asymmetry or nipple discharge bilaterally.  Gastrointestinal:   Abdominal Examination:  Abdomen nontender to palpation, tone normal without rigidity or guarding, no masses present, umbilicus without lesions   Liver and Spleen:  No hepatomegaly present, liver nontender to palpation    Hernias:  No hernias present  Lymphatic: Lymph Nodes:  No other lymphadenopathy present  Skin:  General Inspection:  No rashes present, no lesions present, no areas of  discoloration  Neurologic:    Mental Status:  Oriented X3.  Normal strength and tone, sensory exam                grossly normal, mentation intact and speech normal.    Psychiatric:   Mentation appears normal and affect normal/bright.         Pelvic Exam:  External Genitalia:     Normal appearance for age, no discharge present, no tenderness present, no inflammatory lesions present, color normal  Vagina:     Normal vaginal vault without central or paravaginal defects, no discharge present, no inflammatory lesions present, no masses present  Bladder:     Nontender to palpation  Urethra:   Urethral Body:  Urethra palpation normal, urethra structural support normal   Urethral Meatus:  No erythema or lesions present  Cervix:     Appearance healthy, no lesions present, nontender to palpation, no bleeding present  Uterus:     Uterus: firm, normal sized and nontender, anteverted in position.   Adnexa:     No adnexal tenderness present, no adnexal masses present  Perineum:     Perineum within normal limits, no  evidence of trauma, no rashes or skin lesions present  Anus:     Anus within normal limits, no hemorrhoids present  Inguinal Lymph Nodes:     No lymphadenopathy present  Pubic Hair:     Normal pubic hair distribution for age  Genitalia and Groin:     No rashes present, no lesions present, no areas of discoloration, no masses present      COUNSELING:   Reviewed preventive health counseling, as reflected in patient instructions  Special attention given to:        Family planning    BMI: Body mass index is 24.6 kg/m .      ASSESSMENT:  35 year old female with satisfactory annual exam.    ICD-10-CM    1. Encounter for gynecological examination without abnormal finding  Z01.419 HPV High Risk Types DNA Cervical     Pap imaged thin layer screen with HPV - recommended age 30 - 65 years (select HPV order below)   2. Bicornuate uterus  Q51.3    3. Procreative management  Z31.9    4. Carrier of high risk cancer gene mutation  Z14.8    5. ANH I (cervical intraepithelial neoplasia I)  N87.0        PLAN:  Pap and hpv repeated today. Discussed HPV 16 vs the other HR types and their progression/regression rates, spont resolution and average length of time for that. Had confirmed ANH I after her last pap but with covid this f/u was delayed so at 18 months now and hopefully will have cleared it. If not then may have to repeat colpo and should wait to see what that plan needs to be before conceiving just in case need to do any interventions    Discussed her PTL and PTD. Some of it is related to her bicornuate uterus and the shape/stretch of a uterine horn vs normal uterus. Discussed malpresentation (which she didn't have) as the highest risk and discussed pregnancy if in same horn vs in another horn and how that may or may not impact her.  Discussed ariel and would certainly be a candidate for that and would strongly encourage it as anecdotally have had very good success with it for myself and my partners. However did discuss the  current situation with the FDA and that there is some consideration being giving to removing it from the market due to minimal effect in controlled studies and some potential longterm unknown risks. Will have to reassess once she is pregnant and likely have MFM consult  Could consider cervical length U/S though had clear PTL and not cervical incompetence, serial FFN, bedrest or not and the pros/cons from studies for that showing inadequate data to indicate benefit on delivery vs just symptomatic improvement of sx from contractions, etc    Tana Ireland MD

## 2020-10-08 ASSESSMENT — ANXIETY QUESTIONNAIRES: GAD7 TOTAL SCORE: 0

## 2020-10-13 LAB
COPATH REPORT: NORMAL
PAP: NORMAL

## 2020-10-14 ENCOUNTER — PATIENT OUTREACH (OUTPATIENT)
Dept: OBGYN | Facility: CLINIC | Age: 35
End: 2020-10-14

## 2020-10-14 DIAGNOSIS — N87.0 DYSPLASIA OF CERVIX, LOW GRADE (CIN 1): ICD-10-CM

## 2020-10-14 NOTE — TELEPHONE ENCOUNTER
2015 NIL pap  6/12/19 NIL pap, + HPV 16 & other. Plan: colpo  8/22/19 Colpo: Bx-ANH 1.  Plan: cotest in 1 year  10/7/20 NIL pap, + HR HPV 16 & other HR type. Plan colp bef 1/7/21

## 2020-11-06 ENCOUNTER — TRANSFERRED RECORDS (OUTPATIENT)
Dept: HEALTH INFORMATION MANAGEMENT | Facility: CLINIC | Age: 35
End: 2020-11-06

## 2020-12-02 ENCOUNTER — OFFICE VISIT (OUTPATIENT)
Dept: OBGYN | Facility: CLINIC | Age: 35
End: 2020-12-02
Payer: COMMERCIAL

## 2020-12-02 VITALS
BODY MASS INDEX: 24.8 KG/M2 | DIASTOLIC BLOOD PRESSURE: 70 MMHG | WEIGHT: 123 LBS | SYSTOLIC BLOOD PRESSURE: 108 MMHG | HEIGHT: 59 IN

## 2020-12-02 DIAGNOSIS — Z01.812 PRE-PROCEDURE LAB EXAM: ICD-10-CM

## 2020-12-02 DIAGNOSIS — R87.810 CERVICAL HIGH RISK HUMAN PAPILLOMAVIRUS (HPV) DNA TEST POSITIVE: Primary | ICD-10-CM

## 2020-12-02 DIAGNOSIS — N87.0 CIN I (CERVICAL INTRAEPITHELIAL NEOPLASIA I): ICD-10-CM

## 2020-12-02 LAB — HCG UR QL: NEGATIVE

## 2020-12-02 PROCEDURE — 88305 TISSUE EXAM BY PATHOLOGIST: CPT | Performed by: PATHOLOGY

## 2020-12-02 PROCEDURE — 57456 ENDOCERV CURETTAGE W/SCOPE: CPT | Performed by: OBSTETRICS & GYNECOLOGY

## 2020-12-02 PROCEDURE — 81025 URINE PREGNANCY TEST: CPT | Performed by: OBSTETRICS & GYNECOLOGY

## 2020-12-02 ASSESSMENT — MIFFLIN-ST. JEOR: SCORE: 1163.55

## 2020-12-02 NOTE — PROGRESS NOTES
INDICATIONS:                                                    Is a pregnancy test required: Yes.  Was it positive or negative?  Negative  Was a consent obtained?  Yes    Today's PHQ-2 Score:   PHQ-2 ( 1999 Pfizer) 11/30/2018   Q1: Little interest or pleasure in doing things 0   Q2: Feeling down, depressed or hopeless 0   PHQ-2 Score 0   Q1: Little interest or pleasure in doing things Not at all   Q2: Feeling down, depressed or hopeless Not at all   PHQ-2 Score 0     Today's PHQ-9 Score:    PHQ-9 SCORE 10/7/2020   PHQ-9 Total Score 0     Annie Clark, is a 35 year old female, who had a recent NIL pap.  HPV 16 & other positive Yes prior history of abnormal pap. Here today for colposcopy. Discussed indication, risks of infection and bleeding.    2015 NIL pap  6/12/19 NIL pap, + HPV 16 & other. Plan: colpo  8/22/19 Colpo: Bx-ANH 1.  Plan: cotest in 1 year  10/7/20 NIL pap, + HR HPV 16 & other HR type. Plan colp bef 1/7/21    PROCEDURE:                                                      Cervix is stained with acetic acid and viewed colposcopically. Squamocolumnar junction is visualized in it's entirety. no visible lesions . Biopsy done No. Endocervical curretage Done         POST PROCEDURE:                                                      IMPRESSION: Patient tolerated procedure well and colposcopy adequate  The posterior endocervical canal did appear to have some more pronounced whitening but may have been just endocervical glandular cells in general rather than abnormality. No whitening of the ectocervix or any visible lesions.    PLAN : Await the results of the biopsies.  She tolerated the procedure well. There were no complications. Patient was discharged in stable condition.    Patient advised to call the clinic if excessive bleeding, pelvic pain, or fever.     Follow-up based on pathology results.  Tana Ireland MD

## 2020-12-04 LAB — COPATH REPORT: NORMAL

## 2020-12-07 ENCOUNTER — PATIENT OUTREACH (OUTPATIENT)
Dept: OBGYN | Facility: CLINIC | Age: 35
End: 2020-12-07
Payer: COMMERCIAL

## 2020-12-07 DIAGNOSIS — N87.0 DYSPLASIA OF CERVIX, LOW GRADE (CIN 1): ICD-10-CM

## 2020-12-07 NOTE — TELEPHONE ENCOUNTER
2015 NIL pap  6/12/19 NIL pap, + HPV 16 & other. Plan: colpo  8/22/19 Colpo: Bx-ANH 1.  Plan: cotest in 1 year  10/7/20 NIL pap, + HR HPV 16 & other HR type. Plan colp bef 1/7/21  10/15/20 pt notified   12/2/20 [place] - [facility], negative. Plan 1 yr co-test. Notified per MyChart

## 2021-06-08 ENCOUNTER — NURSE TRIAGE (OUTPATIENT)
Dept: NURSING | Facility: CLINIC | Age: 36
End: 2021-06-08

## 2021-06-08 NOTE — TELEPHONE ENCOUNTER
Calling to schedule an appointment for her intial appointment/ did a pregnancy test/ and it was positive/LMP 5/10 / sent to scheduling  .KENDRA bonds

## 2021-06-23 ENCOUNTER — PRENATAL OFFICE VISIT (OUTPATIENT)
Dept: NURSING | Facility: CLINIC | Age: 36
End: 2021-06-23
Payer: COMMERCIAL

## 2021-06-23 VITALS — WEIGHT: 123 LBS | BODY MASS INDEX: 24.84 KG/M2

## 2021-06-23 DIAGNOSIS — O09.90 SUPERVISION OF HIGH-RISK PREGNANCY: Primary | ICD-10-CM

## 2021-06-23 PROCEDURE — 99207 PR NO CHARGE NURSE ONLY: CPT

## 2021-06-25 PROBLEM — O34.00 BICORNUATE UTERUS AFFECTING PREGNANCY, ANTEPARTUM: Status: ACTIVE | Noted: 2019-04-06

## 2021-06-25 PROBLEM — Q51.3 BICORNUATE UTERUS AFFECTING PREGNANCY, ANTEPARTUM: Status: ACTIVE | Noted: 2019-04-06

## 2021-06-25 NOTE — PROGRESS NOTES
SUBJECTIVE:     HPI:    This is a 35 year old female patient,  who presents for her first obstetrical visit.    JAMES: 2022, by Last Menstrual Period.  She is 6w4d weeks.  Her cycles are regular.  Her last menstrual period was normal.   Since her LMP, she has experienced  fatigue).   She denies nausea, emesis, abdominal pain, headache, loss of appetite, vaginal discharge, dysuria, pelvic pain, urinary urgency, lightheadedness, urinary frequency, vaginal bleeding, hemorrhoids and constipation.    Additional History: AMA, Bicornuate uterus    Have you travelled during the pregnancy?No  Have your sexual partner(s) travelled during the pregnancy?No      HISTORY:   Planned Pregnancy: Yes  Marital Status:   Occupation:   Living in Household: Spouse and Children    Past History:  Her past medical history   Past Medical History:   Diagnosis Date     Cervical high risk HPV (human papillomavirus) test positive 2019    10/7/20     History of colposcopy 2019     Monoallelic mutation of BRIP1 gene       labor in third trimester with  delivery 2019    PROM, delivered 31wks 1d     Urinary urgency 10/30/2015   .      She has a history of  pre-term delivery at 31w weeks    Since her last LMP she denies use of alcohol, tobacco and street drugs.    Past medical, surgical, social and family history were reviewed and updated in [facility].        Current Outpatient Medications   Medication     Prenatal Vit-Fe Fumarate-FA (PRENATAL 1+1 OR)     BIOTIN PO     Cetirizine-Pseudoephedrine (ZYRTEC-D PO)     VITAMIN D PO     No current facility-administered medications for this visit.        ROS:   12 point review of systems negative other than symptoms noted below or in the HPI.  Constitutional: Fatigue      OBJECTIVE:     EXAM:  Wt 55.8 kg (123 lb)   LMP 05/10/2021   BMI 24.84 kg/m   Body mass index is 24.84 kg/m .  Nurse phone visit completed. Prenatal book and folder  (containing standard educational hand-outs and brochures) will be given at the next visit to patient. Information in folder reviewed over the phone. Questions answered.  Pt advised to call the clinic if she has any questions or concerns related to her pregnancy. Prenatal labs future ordered. New prenatal visit scheduled on 7/12/21 with ..      Lab Results   Component Value Date    PAP NIL 10/07/2020     PHQ-9 score:    PHQ 6/22/2021   PHQ-9 Total Score 0   Q9: Thoughts of better off dead/self-harm past 2 weeks Not at all       ROBERT-7 SCORE 6/12/2019 10/7/2020 6/22/2021   Total Score - - 0 (minimal anxiety)   Total Score 0 0 0       Patient supplied answers from flow sheet for:  Prenatal OB Questionnaire.     Tabitha Campbell RN

## 2021-07-12 ENCOUNTER — PRENATAL OFFICE VISIT (OUTPATIENT)
Dept: OBGYN | Facility: CLINIC | Age: 36
End: 2021-07-12
Payer: COMMERCIAL

## 2021-07-12 ENCOUNTER — ANCILLARY PROCEDURE (OUTPATIENT)
Dept: ULTRASOUND IMAGING | Facility: CLINIC | Age: 36
End: 2021-07-12
Payer: COMMERCIAL

## 2021-07-12 VITALS
DIASTOLIC BLOOD PRESSURE: 64 MMHG | SYSTOLIC BLOOD PRESSURE: 100 MMHG | WEIGHT: 121 LBS | BODY MASS INDEX: 23.75 KG/M2 | HEIGHT: 60 IN

## 2021-07-12 DIAGNOSIS — O09.91 SUPERVISION OF HIGH RISK PREGNANCY IN FIRST TRIMESTER: ICD-10-CM

## 2021-07-12 DIAGNOSIS — O09.521 AMA (ADVANCED MATERNAL AGE) MULTIGRAVIDA 35+, FIRST TRIMESTER: ICD-10-CM

## 2021-07-12 DIAGNOSIS — O09.90 SUPERVISION OF HIGH-RISK PREGNANCY: ICD-10-CM

## 2021-07-12 DIAGNOSIS — O09.899 HX OF PRETERM DELIVERY, CURRENTLY PREGNANT: Primary | ICD-10-CM

## 2021-07-12 DIAGNOSIS — Q51.3 BICORNUATE UTERUS: Primary | ICD-10-CM

## 2021-07-12 DIAGNOSIS — Z87.51 HISTORY OF PRETERM DELIVERY: ICD-10-CM

## 2021-07-12 DIAGNOSIS — Z13.79 GENETIC SCREENING: ICD-10-CM

## 2021-07-12 DIAGNOSIS — O09.891 HISTORY OF PRETERM DELIVERY, CURRENTLY PREGNANT IN FIRST TRIMESTER: ICD-10-CM

## 2021-07-12 LAB
ALBUMIN UR-MCNC: NEGATIVE MG/DL
APPEARANCE UR: CLEAR
BILIRUB UR QL STRIP: NEGATIVE
COLOR UR AUTO: YELLOW
GLUCOSE UR STRIP-MCNC: NEGATIVE MG/DL
HGB UR QL STRIP: NEGATIVE
KETONES UR STRIP-MCNC: NEGATIVE MG/DL
LEUKOCYTE ESTERASE UR QL STRIP: NEGATIVE
NITRATE UR QL: NEGATIVE
PH UR STRIP: 7 [PH] (ref 5–7)
SP GR UR STRIP: 1.02 (ref 1–1.03)
UROBILINOGEN UR STRIP-ACNC: 0.2 E.U./DL

## 2021-07-12 PROCEDURE — 87491 CHLMYD TRACH DNA AMP PROBE: CPT | Performed by: OBSTETRICS & GYNECOLOGY

## 2021-07-12 PROCEDURE — 99207 PR FIRST OB VISIT: CPT | Performed by: OBSTETRICS & GYNECOLOGY

## 2021-07-12 PROCEDURE — 81003 URINALYSIS AUTO W/O SCOPE: CPT | Performed by: OBSTETRICS & GYNECOLOGY

## 2021-07-12 PROCEDURE — 87591 N.GONORRHOEAE DNA AMP PROB: CPT | Performed by: OBSTETRICS & GYNECOLOGY

## 2021-07-12 PROCEDURE — 76817 TRANSVAGINAL US OBSTETRIC: CPT | Performed by: OBSTETRICS & GYNECOLOGY

## 2021-07-12 ASSESSMENT — MIFFLIN-ST. JEOR: SCORE: 1166.38

## 2021-07-12 NOTE — PROGRESS NOTES
Evaluation for 17P   Is this current pregnancy a douglas pregnancy? {Yes & No:577161}  Has this patient experienced a spontaneous,  birth or  rupture of membranes between 20 - 37 weeks of a douglas pregnancy? {Yes & No:[id number]}    { :[id number]}

## 2021-07-12 NOTE — PATIENT INSTRUCTIONS
BIRTH AND 17-alpha hydroxyprogesterone caproate (17P) TREATMENT    What is  birth?      birth is the delivery of a baby before 37 weeks of gestation.  Approximately 1 in every 12 babies in [place] is born premature (that s approximately 6,000 babies every year)!     birth is often unexpected, but can happen for many reasons. There are some known risk factors, which include:   -pregnancy with twins or triplets   -being  race  -some problems with the uterus or cervix   -some medical problems like high blood pressure   -smoking, drinking, or using illegal drugs while pregnant   -infections like urinary tract infection, bacterial vaginosis and chlamydia while    pregnant  -depression, stress, and anxiety    But the biggest risk factor is having a previous  baby. In fact, women who have one  birth have a 30-50% chance of their next baby coming early too.     What are the risks to a baby that delivers prematurely?     birth is the number one cause of  death worldwide. This risk increases the earlier the baby is born.  Prematurity can also cause serious long term health problems for babies such as breathing problems, infections, bleeding into the brain, as well as long term developmental problems like cerebral palsy, learning impairments, hearing and vision problems.    How can I prevent  birth in my pregnancy?  Overall, the best thing to prevent  delivery is to stay healthy during your pregnancy, and follow up with your doctor for your regular visits and screening tests.  If you have a history of  birth in one of your past pregnancies, you may benefit from weekly injections of 17P.     What is 17P?  The full name is 17-alpha hydroxyprogesterone caproate. This is a form of your body s natural  pregnancy hormone , progesterone. The brand name of this is Zayda, and it is FDA approved for prevention of  birth.  This medication  is given in once weekly injections from week 16-20 through the 36th week of pregnancy. Research shows that women taking 17P can reduce their risk of  birth from 50% to 36%. The treatment has also been shown to reduce the risk of complications after birth, such as bleeding in the brain and need for supplemental oxygen.    It is important to complete the treatment once you start, as the risk of  birth goes up if you stop the injections early.    How can I get started on the Zayda treatment?  First, you should talk with your doctor to find out if this is a good option for you.  It is safe for pregnant women and for the fetus, but if you have some medical problems like uncontrolled blood pressure, breast cancer, or liver disease you should talk about it with your doctor first.  Your clinic will work with you to help determine insurance coverage and preauthorization if needed.  Then you will schedule weekly visits for 17P injections in addition to your routine prenatal visits with your doctor.    Side Effects of Mekena  The most common side effects  reported by [facility] users are   injection site reactions (pain [35%], swelling  [17%], pruritus (itching) [6%], nodule [5%]), urticaria (rash) (12%), pruritus (generalized itching (8%), nausea (6%), and diarrhea (2%).

## 2021-07-12 NOTE — PROGRESS NOTES
HPI:     This is a 35 year old female patient,  who presents for her first obstetrical visit.     JAMES: 2022, by Last Menstrual Period.  She is 9w0d weeks.  Her cycles are regular.  Her last menstrual period was normal.   Since her LMP, she has experienced  fatigue).   She denies nausea, emesis, abdominal pain, headache, loss of appetite, vaginal discharge, dysuria, pelvic pain, urinary urgency, lightheadedness, urinary frequency, vaginal bleeding, hemorrhoids and constipation.     Additional History: AMA, Bicornuate uterus     Have you travelled during the pregnancy?No  Have your sexual partner(s) travelled during the pregnancy?No        HISTORY:   Planned Pregnancy: Yes  Marital Status:   Occupation:   Living in Household: Spouse and Children     Past History:  Her past medical history   Past Medical History        Past Medical History:   Diagnosis Date     Cervical high risk HPV (human papillomavirus) test positive 2019     10/7/20     History of colposcopy 2019     Monoallelic mutation of BRIP1 gene        labor in third trimester with  delivery 2019     PROM, delivered 31wks 1d     Urinary urgency 10/30/2015      .       She has a history of  pre-term delivery at 31w weeks     Since her last LMP she denies use of alcohol, tobacco and street drugs.     Past medical, surgical, social and family history were reviewed and updated in EPIC.               Current Outpatient Medications   Medication     Prenatal Vit-Fe Fumarate-FA (PRENATAL 1+1 OR)     BIOTIN PO     Cetirizine-Pseudoephedrine (ZYRTEC-D PO)     VITAMIN D PO      No current facility-administered medications for this visit.          ROS:   12 point review of systems negative other than symptoms noted below or in the HPI.  Constitutional: Fatigue        OBJECTIVE:      EXAM:  Wt 55.8 kg (123 lb)   LMP 05/10/2021   BMI 24.84 kg/m   Body mass index is 24.84 kg/m .  Nurse phone visit  "completed. Prenatal book and folder (containing standard educational hand-outs and brochures) will be given at the next visit to patient. Information in folder reviewed over the phone. Questions answered.  Pt advised to call the clinic if she has any questions or concerns related to her pregnancy. Prenatal labs future ordered. New prenatal visit scheduled on 7/12/21 with .      OBJECTIVE:     EXAM:  /64   Ht 1.518 m (4' 11.75\")   Wt 54.9 kg (121 lb)   LMP 05/10/2021   BMI 23.83 kg/m   Body mass index is 23.83 kg/m .    GENERAL: healthy, alert and no distress  EYES: Eyes grossly normal to inspection, PERRL and conjunctivae and sclerae normal  HENT: ear canals and TM's normal, nose and mouth without ulcers or lesions  NECK: no adenopathy, no asymmetry, masses, or scars and thyroid normal to palpation  RESP: lungs clear to auscultation - no rales, rhonchi or wheezes  CV: regular rate and rhythm, normal S1 S2, no S3 or S4, no murmur, click or rub, no peripheral edema and peripheral pulses strong  ABDOMEN: soft, nontender, no hepatosplenomegaly, no masses and bowel sounds normal  GYN: normal external genitalia.  GC and chlamydia genprobe swab obtained and sent to lab.   MS: no gross musculoskeletal defects noted, no edema  SKIN: no suspicious lesions or rashes  NEURO: Normal strength and tone, mentation intact and speech normal  PSYCH: mentation appears normal, affect normal/bright    Results for orders placed or performed in visit on 07/12/21   UA reflex to Microscopic     Status: Normal   Result Value Ref Range    Color Urine Yellow Colorless, Straw, Light Yellow, Yellow    Appearance Urine Clear Clear    Glucose Urine Negative Negative mg/dL    Bilirubin Urine Negative Negative    Ketones Urine Negative Negative mg/dL    Specific Gravity Urine 1.020 1.003 - 1.035    Blood Urine Negative Negative    pH Urine 7.0 5.0 - 7.0    Protein Albumin Urine Negative Negative mg/dL    Urobilinogen Urine 0.2 0.2, " 1.0 E.U./dL    Nitrite Urine Negative Negative    Leukocyte Esterase Urine Negative Negative    Narrative    Microscopic not indicated   Results for orders placed or performed in visit on 07/12/21   US OB Transvaginal Only     Status: None    Narrative    US OB Transvaginal Only  Order #: [id number] Accession #: [id number]  Study Notes     Deb Carrie ORLANDO on 7/12/2021 10:17 AM      Obstetrical Ultrasound Report  OB U/S  < 14 Weeks -  Transvaginal  [facility]  Referring Provider:Kallie Birmingham MD   Sonographer: Carrie Boyd RDMS, Berta Cormier   Indication:  Viability check      Dating (mm/dd/yyyy):   LMP: 5/10/2021               EDC:  2/14/2022              GA by LMP:           9w0d     Current Scan On:  7/12/2021          EDC:  2/14/2022              GA by Current Scan:          9w0d  The calculation of the gestational age by current scan was based on CRL.  Anatomy Scan:  Gallegos gestation.  Biometry:  CRL                       2.3 cm                  9w0d                      Yolk Sac               0.4 mm                                                     Fetal heart activity:  Rate and rhythm is within normal limits.  Fetal   heart rate: 168 bpm  Findings: Bicornuate Uterus- viable IUP in RT horn , Probable ERVIN #1 2.7x   0.8x 0.7 #2 2.2x 2.0x 0.4cm      Maternal Structures:  Cervix: The cervix appears long and closed.  Right Ovary: Corpus luteum  Left Ovary: Wnl  Impression:           Gallegos IUP with yolk sac and cardiac activity, measures 9w 0d by   today's ultrasound, EDC 2/14/2022.  Bicornuate uterus with pregnancy in the right horn  Subchorionic hemorrhage noted.    Kallie Birmingham MD           ASSESSMENT/PLAN:       ICD-10-CM    1. Bicornuate uterus  Q51.3 [facility] Referral - Pregnancy   2. Supervision of high risk pregnancy in first trimester  O09.91 UA reflex to Microscopic     Urine Culture Aerobic Bacterial     NEISSERIA GONORRHOEA PCR     CHLAMYDIA TRACHOMATIS PCR   3.  AMA (advanced maternal age) multigravida 35+, first trimester  O09.521    4. History of  delivery  Z87.51 [facility]tr Referral - Pregnancy   5. History of  delivery, currently pregnant in first trimester  O09.891        35 year old , On 2021 patient is 9w0d weeks of pregnancy with JAMES of 2022, by Last Menstrual Period      PLAN/PATIENT INSTRUCTIONS:    Discussed as follows:    Discussed options for screening for and diagnosis of chromosomal anomalies, including first trimester screen, noninvasive prenatal testing/cell-free fetal DNA testing, CVS/amniocentesis, quad screen, and ultrasound or comprehensive Level II US at 18-20 weeks. She is electing noninvasive prenatal testing.  She will return next week for testing in addition to her normal OB labs     Reviewed early pregnancy education, diet, exercise, prenatal vitamins, intercourse. Reviewed the call schedule, labor and delivery, and the schedule of prenatal visits.     Follow up in 4 weeks. She is encouraged to call sooner with questions or concerns.     Recommended weight gain for pregnancy: 25-35 lbs.     Referral to [facility] due to history of pre-term labor and bicornuate uterus.  Patient is interested in Zayda.     Kallie Birmingham MD

## 2021-07-13 LAB
C TRACH DNA SPEC QL NAA+PROBE: NEGATIVE
N GONORRHOEA DNA SPEC QL NAA+PROBE: NEGATIVE

## 2021-07-20 ENCOUNTER — LAB (OUTPATIENT)
Dept: LAB | Facility: CLINIC | Age: 36
End: 2021-07-20
Payer: COMMERCIAL

## 2021-07-20 DIAGNOSIS — O09.90 SUPERVISION OF HIGH-RISK PREGNANCY: ICD-10-CM

## 2021-07-20 DIAGNOSIS — Z13.79 GENETIC SCREENING: ICD-10-CM

## 2021-07-20 DIAGNOSIS — O09.521 AMA (ADVANCED MATERNAL AGE) MULTIGRAVIDA 35+, FIRST TRIMESTER: ICD-10-CM

## 2021-07-20 LAB
ABO/RH(D): NORMAL
ANTIBODY SCREEN: NEGATIVE
ERYTHROCYTE [DISTWIDTH] IN BLOOD BY AUTOMATED COUNT: 11.4 % (ref 10–15)
HCT VFR BLD AUTO: 41.7 % (ref 35–47)
HGB BLD-MCNC: 14.1 G/DL (ref 11.7–15.7)
MCH RBC QN AUTO: 30.9 PG (ref 26.5–33)
MCHC RBC AUTO-ENTMCNC: 33.8 G/DL (ref 31.5–36.5)
MCV RBC AUTO: 91 FL (ref 78–100)
PLATELET # BLD AUTO: 205 10E3/UL (ref 150–450)
RBC # BLD AUTO: 4.56 10E6/UL (ref 3.8–5.2)
SPECIMEN EXPIRATION DATE: NORMAL
WBC # BLD AUTO: 7.8 10E3/UL (ref 4–11)

## 2021-07-20 PROCEDURE — 86762 RUBELLA ANTIBODY: CPT

## 2021-07-20 PROCEDURE — 85027 COMPLETE CBC AUTOMATED: CPT

## 2021-07-20 PROCEDURE — 86900 BLOOD TYPING SEROLOGIC ABO: CPT

## 2021-07-20 PROCEDURE — 86901 BLOOD TYPING SEROLOGIC RH(D): CPT

## 2021-07-20 PROCEDURE — 87389 HIV-1 AG W/HIV-1&-2 AB AG IA: CPT

## 2021-07-20 PROCEDURE — 87340 HEPATITIS B SURFACE AG IA: CPT

## 2021-07-20 PROCEDURE — 86850 RBC ANTIBODY SCREEN: CPT

## 2021-07-20 PROCEDURE — 36415 COLL VENOUS BLD VENIPUNCTURE: CPT

## 2021-07-20 PROCEDURE — 86803 HEPATITIS C AB TEST: CPT

## 2021-07-20 PROCEDURE — 86780 TREPONEMA PALLIDUM: CPT

## 2021-07-21 LAB
HBV SURFACE AG SERPL QL IA: NONREACTIVE
HCV AB SERPL QL IA: NONREACTIVE
HIV 1+2 AB+HIV1 P24 AG SERPL QL IA: NONREACTIVE
RUBV IGG SERPL QL IA: 5.81 INDEX
RUBV IGG SERPL QL IA: POSITIVE
T PALLIDUM AB SER QL: NONREACTIVE

## 2021-07-26 LAB
Lab: NORMAL
PERFORMING LABORATORY: NORMAL
SCANNED LAB RESULT: NORMAL
SPECIMEN STATUS: NORMAL
TEST NAME: NORMAL

## 2021-07-28 ENCOUNTER — MYC MEDICAL ADVICE (OUTPATIENT)
Dept: OBGYN | Facility: CLINIC | Age: 36
End: 2021-07-28

## 2021-07-28 ENCOUNTER — TELEPHONE (OUTPATIENT)
Dept: OBGYN | Facility: CLINIC | Age: 36
End: 2021-07-28

## 2021-08-02 ENCOUNTER — PRE VISIT (OUTPATIENT)
Dept: MATERNAL FETAL MEDICINE | Facility: CLINIC | Age: 36
End: 2021-08-02

## 2021-08-06 ENCOUNTER — OFFICE VISIT (OUTPATIENT)
Dept: MATERNAL FETAL MEDICINE | Facility: CLINIC | Age: 36
End: 2021-08-06
Attending: OBSTETRICS & GYNECOLOGY
Payer: COMMERCIAL

## 2021-08-06 ENCOUNTER — HOSPITAL ENCOUNTER (OUTPATIENT)
Dept: ULTRASOUND IMAGING | Facility: CLINIC | Age: 36
End: 2021-08-06
Attending: OBSTETRICS & GYNECOLOGY
Payer: COMMERCIAL

## 2021-08-06 ENCOUNTER — APPOINTMENT (OUTPATIENT)
Dept: LAB | Facility: CLINIC | Age: 36
End: 2021-08-06
Attending: OBSTETRICS & GYNECOLOGY
Payer: COMMERCIAL

## 2021-08-06 DIAGNOSIS — O09.529 AMA (ADVANCED MATERNAL AGE) MULTIGRAVIDA 35+, UNSPECIFIED TRIMESTER: Primary | ICD-10-CM

## 2021-08-06 DIAGNOSIS — O09.899 HX OF PRETERM DELIVERY, CURRENTLY PREGNANT: ICD-10-CM

## 2021-08-06 DIAGNOSIS — O09.521 MULTIGRAVIDA OF ADVANCED MATERNAL AGE IN FIRST TRIMESTER: Primary | ICD-10-CM

## 2021-08-06 PROCEDURE — 76801 OB US < 14 WKS SINGLE FETUS: CPT | Mod: 26 | Performed by: OBSTETRICS & GYNECOLOGY

## 2021-08-06 PROCEDURE — 76801 OB US < 14 WKS SINGLE FETUS: CPT

## 2021-08-06 PROCEDURE — 99205 OFFICE O/P NEW HI 60 MIN: CPT | Mod: 25 | Performed by: OBSTETRICS & GYNECOLOGY

## 2021-08-06 PROCEDURE — 96040 HC GENETIC COUNSELING, EACH 30 MINUTES: CPT | Performed by: GENETIC COUNSELOR, MS

## 2021-08-06 NOTE — NURSING NOTE
Annie presents to [facility] for GC, 1st tri , [facility] consult. Patient met with Dr Gardiner - see note. Plan for L2 at 18 weeks with TV. FVCW will perform TV at 16 weeks. Discharged ambulatory and stable.    Kaycee Stokes RN

## 2021-08-06 NOTE — PROGRESS NOTES
[facility]  Genetic Counseling Consult    Patient:  Annie Clark YOB: 1985   Date of Service:  21      Annie Clark was seen at the [facility] for genetic consultation as part of her appointment for ultrasound and MFM consultation.  The indication for genetic counseling is advanced maternal age. The patient was accompanied by her partner, Jaya to today's visit.        Impression/Plan:   1. Annie had an ultrasound today.  Please see the ultrasound report for further details. Annie underwent [facility] NIPT earlier in this pregnancy which was low risk, male. She is aware diagnostic testing will remain available to her.     2.  Maternal serum AFP (single marker screen) is recommended after 15 weeks to screen for open neural tube defects. A quad screen should not be performed.    3.  An 18-20 week comprehensive ultrasound is standard of care for all women 35 or older at delivery.    4. Annie has a history of  delivery. She had a MFM consultation today, please see corresponding report for further details regarding ongoing pregnancy management recommendations.     Pregnancy History:   /Parity:    Age at Delivery: 36 year old  JAMES: 2022, by Last Menstrual Period  Gestational Age: 12w4d    No significant complications or exposures were reported in the current pregnancy.    Annie s pregnancy history is significant for one  delivery at 31+1 complicated by PROM. Annie had a MFM consultation today, please see corresponding report for further details regarding ongoing pregnancy management recommendations.     Medical History:   Annie was identified to have a bicornuate uterus.       Family History:   A three-generation pedigree was obtained, and is scanned under the  Media  tab.   The following significant findings were reported by Annie:    Annie's partner, Jaya is 38 and healthy.   Jaya's mother was  reported to have a history of bladder and breast cancer diagnosed in her 60's. Jaya's maternal grandfather was also reported to have a history of cancer and passed away over the age of 70. We discussed how most cancer seen in families occurs sporadically, but about 5-10% may be due to an underlying genetic etiology. Jaya was encouraged to share this family history information with his primary care provider.  Annie's paternal grandfather was reported to have prostate cancer. Annie's father was also reported to have a history of prostate cancer and was found to carry a genetic variant in BRIP1. Annie then underwent testing for this familial variant ([id number]) and was was also identified to carry it. We briefly reviewed that pathogenic variants in the BRIP1 gene are associated with increased risk for ovarian cancer. Annie has a good understanding of this gene and associated cancer risk and shared she has had discussions with her OBGYN to consider possible oophorectomy after 41. Annie was made aware of the [facility]'s cancer risk management clinic and how to request an appointment if she is interested in more information or if she would like to follow with their team regarding this variant. She is aware of the 50% chance for her children to inherit this familial variant and this information should be shared with their primary care providers.     Otherwise, the reported family history is negative for multiple miscarriages, stillbirths, birth defects, intellectual disability, known genetic conditions, and consanguinity.       Carrier Screening:   The patient reports that she and the father of the pregnancy have  ancestry:     Cystic fibrosis is an autosomal recessive genetic condition that occurs with increased frequency in individuals of  ancestry and carrier screening for this condition is available.  In addition,  screening in the [facility] includes cystic  fibrosis.      Expanded carrier screening for mutations in a large panel of genes associated with autosomal recessive conditions including cystic fibrosis, spinal muscular atrophy, and others, is now available.      The patient has declined the carrier screening options reviewed today, however are aware that these remain available.       Risk Assessment for Chromosome Conditions:   We explained that the risk for fetal chromosome abnormalities increases with maternal age. We discussed specific features of common chromosome abnormalities, including Down syndrome, trisomy 13, trisomy 18, and sex chromosome trisomies.      - At age 36 at midtrimester, the risk to have a baby with Down syndrome is 1 in 274.     - At age 36 at midtrimester, the risk to have a baby with any chromosome abnormality is 1 in 135.       Annie had maternal serum screening earlier in pregnancy.     Non-invasive Prenatal Testing (NIPT)    Maternal plasma cell-free DNA testing    Screens for fetal trisomy 21, trisomy 13, trisomy 18, and sex chromosome aneuploidy    First trimester ultrasound was performed today.     Annie had an [facility] NIPT earlier in pregnancy; we reviewed the results today, which are normal for chromosome 13, chromosome 18, chromosome 21 and sex chromosomes (no aneuploidy detected)    Given the accuracy of this test, these results greatly decrease the chance for certain fetal chromosome abnormalities    We discussed the limitations of normal NIPT results    MSAFP is recommended after 15 weeks for open neural tube defect screening         Testing Options:   We discussed the following options:   Chorionic villus sampling (CVS)    Invasive procedure typically performed in the first trimester by which placental villi are obtained for the purpose of chromosome analysis and/or other prenatal genetic analysis    Diagnostic results; >99% sensitivity for fetal chromosome abnormalities    Cannot test for open neural tube defects; maternal  serum AFP after 15 weeks is recommended     Genetic Amniocentesis    Invasive procedure typically performed in the second trimester by which amniotic fluid is obtained for the purpose of chromosome analysis and/or other prenatal genetic analysis    Diagnostic results; >99% sensitivity for fetal chromosome abnormalities    AFAFP measurement tests for open neural tube defects     Comprehensive (Level II) ultrasound: Detailed ultrasound performed between 18-22 weeks gestation to screen for major birth defects and markers for aneuploidy.      We reviewed the benefits and limitations of this testing.  Screening tests provide a risk assessment specific to the pregnancy for certain fetal chromosome abnormalities, but cannot definitively diagnose or exclude a fetal chromosome abnormality.  Follow-up genetic counseling and consideration of diagnostic testing is recommended with any abnormal screening result.     Diagnostic tests carry inherent risks- including risk of miscarriage- that require careful consideration.  These tests can detect fetal chromosome abnormalities with greater than 99% certainty.  Results can be compromised by maternal cell contamination or mosaicism, and are limited by the resolution of cytogenetic G-banding technology.  There is no screening nor diagnostic test that can detect all forms of birth defects or mental disability.    It was a pleasure to be involved with Annie s care. Face-to-face time of the meeting was 35 minutes.      Rose Mcclellan MS, [facility]  Licensed Genetic Counselor  [facility]  Maternal Fetal Medicine  Ph: [phone]  [email or web address]

## 2021-08-06 NOTE — PROGRESS NOTES
"RE: Annie Clark  : 1985  MRUN: [id number]    2021    Dear Dr. Ireland,    Thank you for referring your patient Annie Clark for a Maternal-Fetal Medicine consultation today.  As you know, she is a 36 year old  at 12 weeks and 4 days gestation with an estimated date of delivery of 2022 by LMP consistent with 9 week ultrasound.  She came to me today to discuss recommendations as she has a history of spontaneous  birth as well as a bicornuate uterus.  Today she feels well.  She denies contractions, leakage of fluid and vaginal bleeding.      Annie's last pregnancy was complicated by several admissions for  labor.  She ultimately experienced PPROM at 30w6 days followed by  labor at delivery at 31w1d despite magnesium and nifedipine.  Her son stayed in the [facility] for [facility] days.  He is currently an \"awesome\" 3 year old who was followed in the NICU grad clinic and is now meeting all milestones.      Obstetrical History:    # Outcome Date GA Lbr Leeroy/2nd Weight Sex Delivery Anes PTL Lv   2 Current            1  19 31w1d 04:00 / 00:22 1.5 kg (3 lb 4.9 oz) M Vag-Spont Local Y BINA      Name: Harsha \"JT\"      Apgar1: 8  Apgar5: 8     Gynecological History:    Known bicornuate uterus.    Regular cycles.    She denies a history of frequent urinary tract infections, vaginal infections or sexually transmitted infections.    She denies a history of cervical procedures/surgeries.    No known history of myomas.    Medical History:   Diagnosis Date     Cervical high risk HPV (human papillomavirus) test positive 2019     History of colposcopy 2019     Monoallelic mutation of BRIP1 gene      Surgical History:   Procedure Laterality Date     C TRANSNASAL EUSTACH TUBE INFLATE,CATH       MANDIBLE SURGERY      lower and upper jaw surgery     PARTIAL HYMENECTOMY/REVISION HYMENAL RING  2008     TONSILLECTOMY       Medications:      Prenatal Vit-Fe " Fumarate-FA (PRENATAL 1+1 OR)    Allergies:   Allergen Reactions     Nuts Anaphylaxis and Nausea and Vomiting     Peas Nausea and Vomiting and Swelling     Thimerosal Other (See Comments) and Swelling     Dry skin, redness     Social History:    She  reports that she has never smoked. She has never used smokeless tobacco. She reports that she does not drink alcohol and does not use drugs.    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      Family History:     Family history BRIP1 (see genetic counseling note)    Otherwise, she denies a family history of motor/intellectual impairment, stillbirth, genetic or chromosome abnormalities or congenital anomalies.   No known family history of a bleeding or clotting disorder.      Partner History:    He denies a family history of motor/intellectual impairment, stillbirth, genetic or chromosome abnormalities or congenital anomalies.       Review of Systems:    10 point review of systems negative except as noted in the HPI    Data Reviewed:      BMI:24.21 kg/m2    Ultrasound:    Please see imaging tab/separate report for ultrasound performed at [facility] today    Physical examination was deferred at this time.    In light of the patient s history as listed above my recommendations can be summarized briefly as follows:    History of Spontaneous  Birth (PTB)     Today we discussed the incidence and epidemiology of  birth (PTB).  There are multiple etiologies of spontaneous PTB, including but not limited to infection, bleeding, uterine distension, cervical insufficiency and stress.   We reviewed that a bicornuate uterus is a risk factor.  A history of prior  delivery is a significant risk factor for recurrence in a subsequent pregnancy.  Recurrent  delivery has been linked to maternal ethnicity, genitourinary infection, and especially gestational age at the first  delivery: the earlier the delivery, the greater the  likelihood of recurrence.  We discussed that recurrent  birth can happen at the same gestational age as a prior  birth but that it cannot be predicted and it could recur at an earlier gestational age.  We also discussed the concept of cervical insufficiency which is another cause of  birth and is classically described as painless cervical dilation.  Most women with cervical insufficiency which is either undiagnosed or those undergoing expectant management will eventually develop symptoms, which may be pressure, back pain, cramping, leakage of fluid, vaginal bleeding and ultimately contractions.  Mid-trimester cervical dilation increases the risk of intra-amniotic infection which can then cause contractions and labor.      We discussed the increased  morbidity and mortality with prematurity which is the rationale for trying to prevent recurrent  birth.  We follow cervical lengths every two from 16 to 23 weeks in women with a history of PTB; an ultrasound indicated cerclage may be considered if shortening is detected prior to 23-24 weeks, given that all cases of cervical insufficiency may not fit into the classic patterns by history.    We discussed the data regarding the use of weekly intramuscular injections of 17 hydroxyprogesterone caproate (17-OHP) supplementation as a means to modify the risk of recurrent  birth.  A multicenter, double-blind randomized controlled trial found a 34% reduction in recurrent  birth <37 weeks (Nayely et al, 2003) with the use of 17-OHP .  This trial also found a significant reduction in recurrent  birth <35 and <32 weeks.  A more recent international, double-blind randomized controlled trial (PROLONG) found no reduction in the rate of recurrent  birth <35 weeks (Rupesh et al, 2019), calling into question the established use of 17-OHP.  Importantly, both studies indicate that 17-OHP is safe, at least in the short term.        The Society for Maternal Fetal Medicine interprets these disparate results as possibly partially related to the different populations in the two studies (59% black in [place] versus 90% white in [place]; 50%  in [place] versus 90% in [place]; 20% tobacco use in [place] versus 8% in [place]; 32% had more than one prior PTB in [place] versus 12% in [place]; 91% with at least on additional risk factor for PTB in [place] versus 48% in [place]).  The current conclusion of SMFM is that it is reasonable to offer 17-OHP to women with a profile more representative of the very high risk patient, but that all women at risk of recurrent  birth should have a discussion of the risks/benefits and undergo a shared decision-making process.    After the publication of the [place] (PROLONG) study an FDA Advisory Committee recommended withdrawing McLean (17 - OHP) off the market.  The FDA decision is still pending.  [facility] did not change their position.     Importantly, we discussed that her bicornuate uterus was likely a factor and that 17-OHP is not going to impact the shape of her uterus or prevent  birth related to a Mullerian anomaly.  We discussed that it is impossible to say whether she would have had a  birth if she had not had a bicornuate uterus.  Importantly we re-emphasized that the medication is thought to be safe    Recommendations:      Annie will think about our 17-hydroxyprogesterone caproate discussion and discuss it further with you.      Baseline transvaginal cervical length at 16 weeks' gestation with measurements every 2 weeks through 23 weeks; these to be done at your office except for the 18 week one which will be done with MFM.       Comprehensive ultrasound with CL is scheduled with [facility] at 18 weeks    Monthly ultrasound assessment of fetal growth starting at 24 weeks    Refer back to [facility] if shortening <25 mm is diagnosed <24 weeks.    Administration of  corticosteroids if the  patient is deemed to be at increased risk of imminent  delivery.    Urine culture every trimester with aggressive treatment of bacteriuria.    Clinical evaluation of  labor symptoms.      Annie Clark is vaccinated against COVID-19 and on-going precautions were reviewed in light of the delta variant.  I emphasized masking, hand hygiene and physical distancing.  I have discouraged her from working [place] [employer] [place] [employer].       At the end of our discussion, Annie Clark indicated that her questions were answered and she seemed satisfied with our discussion.  Thank you for the opportunity to participate in your patient s care.  If I can be of any further assistance, please do not hesitate to contact me.    Sincerely,      Zita Gardiner MD  , OB/GYN  Maternal-Fetal Medicine  [email or web address]  [phone] (Main M Office)  [phone] or [phone] (for 24 hour [facility] questions)  [phone] (Pager)      Time Spent on this Encounter   I spent a total 60 minutes on the encounter with Annie Clark today   More than 50% of my time was spent on counseling and/or coordination of care, with a total of 30 minutes was spent face to face with Annie Clark today   - Counseling the patient and/or family regarding: diagnosis, diagnostic results, prognosis and risks and benefits of management options   - Coordination of care with the: the genetic counselor, the sonographer, the nurse and patient and her     Date of service (when I saw the patient): 2021

## 2021-08-09 ENCOUNTER — PRENATAL OFFICE VISIT (OUTPATIENT)
Dept: OBGYN | Facility: CLINIC | Age: 36
End: 2021-08-09
Payer: COMMERCIAL

## 2021-08-09 VITALS — DIASTOLIC BLOOD PRESSURE: 50 MMHG | SYSTOLIC BLOOD PRESSURE: 92 MMHG | BODY MASS INDEX: 24.42 KG/M2 | WEIGHT: 124 LBS

## 2021-08-09 DIAGNOSIS — O09.521 SUPERVISION OF HIGH RISK ELDERLY MULTIGRAVIDA IN FIRST TRIMESTER: Primary | ICD-10-CM

## 2021-08-09 DIAGNOSIS — O34.01 BICORNUATE UTERUS AFFECTING PREGNANCY IN FIRST TRIMESTER, ANTEPARTUM: ICD-10-CM

## 2021-08-09 DIAGNOSIS — O09.211 HIGH RISK PREGNANCY DUE TO HISTORY OF PRETERM LABOR IN FIRST TRIMESTER: ICD-10-CM

## 2021-08-09 DIAGNOSIS — Q51.3 BICORNUATE UTERUS AFFECTING PREGNANCY IN FIRST TRIMESTER, ANTEPARTUM: ICD-10-CM

## 2021-08-09 PROCEDURE — 99207 PR PRENATAL VISIT: CPT | Performed by: OBSTETRICS & GYNECOLOGY

## 2021-08-09 RX ORDER — HYDROXYPROGESTERONE CAPROATE 250 MG/ML
250 INJECTION INTRAMUSCULAR
Status: DISCONTINUED | OUTPATIENT
Start: 2021-08-23 | End: 2022-01-31

## 2021-08-09 NOTE — PROGRESS NOTES
"No nausea. Some bloating and def craving only carbs and not meat or veggies. Discussed first vs subs. 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 Discussed vaxx, delta, patrons leaning in to order from her and discussed masking or at least face shields. [facility] has recommended again her working this job. Recommend the same if based solely on medical issues. However if financially needs to pursue this than at minimum masking +/-shields  Discussed prev PTL. Admit at 27 weeks and then again at 30+5 and eventual deliver at 31+1.  Discussed data on ariel vs clinical experience with it. Given her bicornuate uterus that may be part of the PTL as well so could impact ariel effectiveness. Despite lit to show no benefit, clinically and at her prev early GA do think I'd recommend it as there is no known harm.  Patient would lke to do it so will place order. Lives in [place] so hoping could do weekly shots at a [facility] clinic closer to home  LII with [facility] sched at 18 weeks. Return here in 3 weeks with AFP  NIPT normal and reviewed results with them  Evaluation for 17P   Is this current pregnancy a douglas pregnancy? Yes  Has this patient experienced a spontaneous,  birth or  rupture of membranes between 20 - 37 weeks of a douglas pregnancy? Yes    Both answers are \"Yes\" the patient may be a candidate for \"17P\" [place].        Assessment:  Annie is a 36 year old  at 13w0d with a history of prior spontaneous douglas  birth.  Given this history, Annie is at risk for recurrent  birth in this pregnancy.  I discussed the availability of 17-alpha hydroxyprogesterone caproate (17P), which has been demonstrated to reduce the incidence of recurrent  birth and Annie does meet criteria for weekly 17P administration in this pregnancy.    Patient has no contraindications to 17P.    Informed patient that 17P requires a commitment to weekly injection which " should begin before 20+6 weeks and abrupt discontinuation can increase the risk for  birth.  Patient agrees to proceed with weekly 17P injections.      Plan:   17P 250mg IM weekly beginning between 16-20 weeks through 36 weeks gestation  Serial transvaginal ultrasound for cervical length from 16 through 22-23 weeks gestation  Screen for asymptomatic bacteriuria at first prenatal visit and treat with appropriate antibiotics if present  Smoking risk discussed. Non smoking household.

## 2021-08-09 NOTE — Clinical Note
Put in ariel order. She is currently 13 weeks. She's in [place]. Hoping maybe to do her shots at a [facility] clinic closer to home if we can arrange it for her once PA goes through    AJ

## 2021-08-09 NOTE — PATIENT INSTRUCTIONS
BIRTH AND 17-alpha hydroxyprogesterone caproate (17P) TREATMENT    What is  birth?      birth is the delivery of a baby before 37 weeks of gestation.  Approximately 1 in every 12 babies in [place] is born premature (that s approximately 6,000 babies every year)!     birth is often unexpected, but can happen for many reasons. There are some known risk factors, which include:   -pregnancy with twins or triplets   -being  race  -some problems with the uterus or cervix   -some medical problems like high blood pressure   -smoking, drinking, or using illegal drugs while pregnant   -infections like urinary tract infection, bacterial vaginosis and chlamydia while    pregnant  -depression, stress, and anxiety    But the biggest risk factor is having a previous  baby. In fact, women who have one  birth have a 30-50% chance of their next baby coming early too.     What are the risks to a baby that delivers prematurely?     birth is the number one cause of  death worldwide. This risk increases the earlier the baby is born.  Prematurity can also cause serious long term health problems for babies such as breathing problems, infections, bleeding into the brain, as well as long term developmental problems like cerebral palsy, learning impairments, hearing and vision problems.    How can I prevent  birth in my pregnancy?  Overall, the best thing to prevent  delivery is to stay healthy during your pregnancy, and follow up with your doctor for your regular visits and screening tests.  If you have a history of  birth in one of your past pregnancies, you may benefit from weekly injections of 17P.     What is 17P?  The full name is 17-alpha hydroxyprogesterone caproate. This is a form of your body s natural  pregnancy hormone , progesterone. The brand name of this is Zayda, and it is FDA approved for prevention of  birth.  This medication  is given in once weekly injections from week 16-20 through the 36th week of pregnancy. Research shows that women taking 17P can reduce their risk of  birth from 50% to 36%. The treatment has also been shown to reduce the risk of complications after birth, such as bleeding in the brain and need for supplemental oxygen.    It is important to complete the treatment once you start, as the risk of  birth goes up if you stop the injections early.    How can I get started on the Zayda treatment?  First, you should talk with your doctor to find out if this is a good option for you.  It is safe for pregnant women and for the fetus, but if you have some medical problems like uncontrolled blood pressure, breast cancer, or liver disease you should talk about it with your doctor first.  Your clinic will work with you to help determine insurance coverage and preauthorization if needed.  Then you will schedule weekly visits for 17P injections in addition to your routine prenatal visits with your doctor.    Side Effects of Mekena  The most common side effects  reported by [place] users are   injection site reactions (pain [35%], swelling  [17%], pruritus (itching) [6%], nodule [5%]), urticaria (rash) (12%), pruritus (generalized itching (8%), nausea (6%), and diarrhea (2%).

## 2021-08-30 ENCOUNTER — PRENATAL OFFICE VISIT (OUTPATIENT)
Dept: OBGYN | Facility: CLINIC | Age: 36
End: 2021-08-30
Payer: COMMERCIAL

## 2021-08-30 VITALS — BODY MASS INDEX: 24.89 KG/M2 | WEIGHT: 126.4 LBS | SYSTOLIC BLOOD PRESSURE: 100 MMHG | DIASTOLIC BLOOD PRESSURE: 62 MMHG

## 2021-08-30 DIAGNOSIS — O09.212 HIGH RISK PREGNANCY DUE TO HISTORY OF PRETERM LABOR IN SECOND TRIMESTER: ICD-10-CM

## 2021-08-30 DIAGNOSIS — Q51.3 BICORNUATE UTERUS AFFECTING PREGNANCY IN SECOND TRIMESTER, ANTEPARTUM: ICD-10-CM

## 2021-08-30 DIAGNOSIS — O09.522 SUPERVISION OF HIGH RISK ELDERLY MULTIGRAVIDA IN SECOND TRIMESTER: Primary | ICD-10-CM

## 2021-08-30 DIAGNOSIS — O34.02 BICORNUATE UTERUS AFFECTING PREGNANCY IN SECOND TRIMESTER, ANTEPARTUM: ICD-10-CM

## 2021-08-30 PROCEDURE — 99207 PR PRENATAL VISIT: CPT | Performed by: OBSTETRICS & GYNECOLOGY

## 2021-08-31 ENCOUNTER — MYC MEDICAL ADVICE (OUTPATIENT)
Dept: OBGYN | Facility: CLINIC | Age: 36
End: 2021-08-31

## 2021-09-01 ENCOUNTER — ALLIED HEALTH/NURSE VISIT (OUTPATIENT)
Dept: NURSING | Facility: CLINIC | Age: 36
End: 2021-09-01
Payer: COMMERCIAL

## 2021-09-01 DIAGNOSIS — O09.92 SUPERVISION OF HIGH RISK PREGNANCY IN SECOND TRIMESTER: Primary | ICD-10-CM

## 2021-09-01 PROCEDURE — 96372 THER/PROPH/DIAG INJ SC/IM: CPT | Performed by: OBSTETRICS & GYNECOLOGY

## 2021-09-01 PROCEDURE — 99207 PR NO CHARGE NURSE ONLY: CPT

## 2021-09-01 RX ADMIN — HYDROXYPROGESTERONE CAPROATE 250 MG: 250 INJECTION INTRAMUSCULAR at 09:59

## 2021-09-01 NOTE — PROGRESS NOTES
Clinic Administered Medication Documentation    Administrations This Visit     HYDROXYprogesterone caproate (SOCO) intramuscular injection 250 mg     Admin Date  09/01/2021 Action  Given Dose  250 mg Route  Intramuscular Site  Right Ventrogluteal Administered By  Crystal De Guzman RN    Ordering Provider: Tana Ireland MD    NDC: 9462-9739-41    Lot#: [id number]    : [employer]    Patient Supplied?: No                  Injectable Medication Documentation    Patient was given Hydroxyprogesterone caproate . Prior to medication administration, verified patients identity using patient s name and date of birth. Please see MAR and medication order for additional information. Patient instructed to remain in clinic for 15 minutes and report any adverse reaction to staff immediately .      Was entire vial of medication used? Yes  Vial/Syringe: Single dose vial  Expiration Date:  2/2022  Was this medication supplied by the patient? No   Crystal De Guzman RN on 9/1/2021 at 10:02 AM

## 2021-09-06 NOTE — PROGRESS NOTES
Patient is overall doing well.   Decided not to work the [employer] after all as didn't feel like the safest option now that pregnant  Slow but now having some weight gain and feeling much better with no nausea and improving fatigue  No cramping or ctx.  Lengthy discussion of ariel and the initial study and the re-eval of the study with the pros/cons. The evidence from study shows that there is no statistical benefit but clinical experience of most ob/gyns shows benefit. With how early she delivered I would still recommend she do it. We had decided to do this and patient asked if could receive her weekly shots at EP  clinic as closer to home/school and do recall discussing this with our triage RNs'. However patient never heard back from them and would be due for first shot today  As end of the day and triage no longer here, have informed her that we will figure it out on our end connect with her to hopefully get her her first shot this week  Discussed AFP and patient declines  Has her LII with MFM upcoming at 18+ weeks  No other concerns at this time  Return in 4 weeks and weekly for ariel in between

## 2021-09-08 ENCOUNTER — ALLIED HEALTH/NURSE VISIT (OUTPATIENT)
Dept: NURSING | Facility: CLINIC | Age: 36
End: 2021-09-08
Payer: COMMERCIAL

## 2021-09-08 DIAGNOSIS — O09.92 SUPERVISION OF HIGH RISK PREGNANCY IN SECOND TRIMESTER: Primary | ICD-10-CM

## 2021-09-08 PROCEDURE — 99207 PR NO CHARGE NURSE ONLY: CPT

## 2021-09-08 PROCEDURE — 96372 THER/PROPH/DIAG INJ SC/IM: CPT | Performed by: OBSTETRICS & GYNECOLOGY

## 2021-09-08 RX ADMIN — HYDROXYPROGESTERONE CAPROATE 250 MG: 250 INJECTION INTRAMUSCULAR at 15:48

## 2021-09-08 NOTE — PROGRESS NOTES
Clinic Administered Medication Documentation    Administrations This Visit     HYDROXYprogesterone caproate (SOCO) intramuscular injection 250 mg     Admin Date  09/08/2021 Action  Given Dose  250 mg Route  Intramuscular Site  Right Ventrogluteal Administered By  Crystal De Guzman RN    Ordering Provider: Tana Ireland MD    NDC: 8691-5670-49    Lot#: [id number]    : [employer]    Patient Supplied?: No                  Injectable Medication Documentation    Patient was given Hydroxypregesterone caproate. Prior to medication administration, verified patients identity using patient s name and date of birth. Please see MAR and medication order for additional information. Patient instructed to report any adverse reaction to staff immediately .      Was entire vial of medication used? Yes  Vial/Syringe: Single dose vial  Expiration Date:  11/2022  Was this medication supplied by the patient? No   Crystal De Guzman RN on 9/8/2021 at 3:52 PM

## 2021-09-16 ENCOUNTER — OFFICE VISIT (OUTPATIENT)
Dept: MATERNAL FETAL MEDICINE | Facility: CLINIC | Age: 36
End: 2021-09-16
Attending: OBSTETRICS & GYNECOLOGY
Payer: COMMERCIAL

## 2021-09-16 ENCOUNTER — HOSPITAL ENCOUNTER (OUTPATIENT)
Dept: ULTRASOUND IMAGING | Facility: CLINIC | Age: 36
End: 2021-09-16
Attending: OBSTETRICS & GYNECOLOGY
Payer: COMMERCIAL

## 2021-09-16 ENCOUNTER — ALLIED HEALTH/NURSE VISIT (OUTPATIENT)
Dept: NURSING | Facility: CLINIC | Age: 36
End: 2021-09-16
Payer: COMMERCIAL

## 2021-09-16 DIAGNOSIS — O09.899 HX OF PRETERM DELIVERY, CURRENTLY PREGNANT: ICD-10-CM

## 2021-09-16 DIAGNOSIS — O34.02 BICORNUATE UTERUS AFFECTING PREGNANCY IN SECOND TRIMESTER, ANTEPARTUM: ICD-10-CM

## 2021-09-16 DIAGNOSIS — O09.529 AMA (ADVANCED MATERNAL AGE) MULTIGRAVIDA 35+, UNSPECIFIED TRIMESTER: Primary | ICD-10-CM

## 2021-09-16 DIAGNOSIS — O09.92 SUPERVISION OF HIGH RISK PREGNANCY IN SECOND TRIMESTER: Primary | ICD-10-CM

## 2021-09-16 DIAGNOSIS — Q51.3 BICORNUATE UTERUS AFFECTING PREGNANCY IN SECOND TRIMESTER, ANTEPARTUM: ICD-10-CM

## 2021-09-16 PROCEDURE — 76811 OB US DETAILED SNGL FETUS: CPT | Mod: 26 | Performed by: OBSTETRICS & GYNECOLOGY

## 2021-09-16 PROCEDURE — 99207 PR NO CHARGE NURSE ONLY: CPT

## 2021-09-16 PROCEDURE — 76817 TRANSVAGINAL US OBSTETRIC: CPT

## 2021-09-16 PROCEDURE — 96372 THER/PROPH/DIAG INJ SC/IM: CPT | Performed by: OBSTETRICS & GYNECOLOGY

## 2021-09-16 PROCEDURE — 76817 TRANSVAGINAL US OBSTETRIC: CPT | Mod: 26 | Performed by: OBSTETRICS & GYNECOLOGY

## 2021-09-16 RX ADMIN — HYDROXYPROGESTERONE CAPROATE 250 MG: 250 INJECTION INTRAMUSCULAR at 13:53

## 2021-09-16 NOTE — NURSING NOTE
Clinic Administered Medication Documentation    Administrations This Visit     HYDROXYprogesterone caproate (SOCO) intramuscular injection 250 mg     Admin Date  09/16/2021 Action  Given Dose  250 mg Route  Intramuscular Site  Left Ventrogluteal Administered By  Jenna Ashley RN    Ordering Provider: Tana Ireland MD    NDC: 3211-0183-07    Lot#: [id number]    : [employer]    Patient Supplied?: No                  Injectable Medication Documentation    Patient was given Hydroxyprogesterone Caproate. Prior to medication administration, verified patients identity using patient s name and date of birth. Please see MAR and medication order for additional information. Patient instructed to report any adverse reaction to staff immediately .      Was entire vial of medication used? Yes  Vial/Syringe: Single dose vial  Expiration Date:  11/1/2022  Was this medication supplied by the patient? No     Patient tolerated injection without incident and discharged home.  Jenna Ashley RN on 9/16/2021 at 1:58 PM

## 2021-09-16 NOTE — PROGRESS NOTES
The patient was seen for an ultrasound in the Maternal-Fetal Medicine Center at the [facility] today.  For a detailed report of the ultrasound examination, please see the ultrasound report which can be found under the imaging tab.    Genevieve Oliveira MD  , OB/GYN  Maternal-Fetal Medicine  [phone] (Pager)

## 2021-09-22 ENCOUNTER — ALLIED HEALTH/NURSE VISIT (OUTPATIENT)
Dept: NURSING | Facility: CLINIC | Age: 36
End: 2021-09-22
Payer: COMMERCIAL

## 2021-09-22 DIAGNOSIS — O09.92 SUPERVISION OF HIGH RISK PREGNANCY IN SECOND TRIMESTER: Primary | ICD-10-CM

## 2021-09-22 PROCEDURE — 96372 THER/PROPH/DIAG INJ SC/IM: CPT | Performed by: OBSTETRICS & GYNECOLOGY

## 2021-09-22 PROCEDURE — 99207 PR NO CHARGE NURSE ONLY: CPT

## 2021-09-22 RX ADMIN — HYDROXYPROGESTERONE CAPROATE 250 MG: 250 INJECTION INTRAMUSCULAR at 15:47

## 2021-09-22 NOTE — PROGRESS NOTES
Clinic Administered Medication Documentation    Administrations This Visit     HYDROXYprogesterone caproate (SOCO) intramuscular injection 250 mg     Admin Date  09/22/2021 Action  Given Dose  250 mg Route  Intramuscular Site  Right Ventrogluteal Administered By  Crystal De Guzman RN    Ordering Provider: Tana Ireland MD    NDC: 7880-5732-68    Lot#: [id number]    : [employer]    Patient Supplied?: No                  Injectable Medication Documentation    Patient was given Hydroxyprogesterone caproate. Prior to medication administration, verified patients identity using patient s name and date of birth. Please see MAR and medication order for additional information. Patient instructed to report any adverse reaction to staff immediately .      Was entire vial of medication used? Yes  Vial/Syringe: Single dose vial  Expiration Date:  2/2023  Was this medication supplied by the patient? No   Crystal De Guzman RN on 9/22/2021 at 3:50 PM

## 2021-09-27 ENCOUNTER — ALLIED HEALTH/NURSE VISIT (OUTPATIENT)
Dept: NURSING | Facility: CLINIC | Age: 36
End: 2021-09-27
Payer: COMMERCIAL

## 2021-09-27 ENCOUNTER — PRENATAL OFFICE VISIT (OUTPATIENT)
Dept: OBGYN | Facility: CLINIC | Age: 36
End: 2021-09-27
Payer: COMMERCIAL

## 2021-09-27 VITALS — SYSTOLIC BLOOD PRESSURE: 111 MMHG | BODY MASS INDEX: 25.64 KG/M2 | DIASTOLIC BLOOD PRESSURE: 71 MMHG | WEIGHT: 130.2 LBS

## 2021-09-27 VITALS
WEIGHT: 130.2 LBS | SYSTOLIC BLOOD PRESSURE: 111 MMHG | DIASTOLIC BLOOD PRESSURE: 71 MMHG | HEART RATE: 84 BPM | BODY MASS INDEX: 25.64 KG/M2

## 2021-09-27 DIAGNOSIS — O09.212 HIGH RISK PREGNANCY DUE TO HISTORY OF PRETERM LABOR IN SECOND TRIMESTER: ICD-10-CM

## 2021-09-27 DIAGNOSIS — Q51.3 BICORNUATE UTERUS AFFECTING PREGNANCY IN SECOND TRIMESTER, ANTEPARTUM: ICD-10-CM

## 2021-09-27 DIAGNOSIS — O34.02 BICORNUATE UTERUS AFFECTING PREGNANCY IN SECOND TRIMESTER, ANTEPARTUM: ICD-10-CM

## 2021-09-27 DIAGNOSIS — O09.92 SUPERVISION OF HIGH RISK PREGNANCY IN SECOND TRIMESTER: Primary | ICD-10-CM

## 2021-09-27 DIAGNOSIS — Z23 NEED FOR PROPHYLACTIC VACCINATION AND INOCULATION AGAINST INFLUENZA: ICD-10-CM

## 2021-09-27 DIAGNOSIS — O09.522 SUPERVISION OF HIGH RISK ELDERLY MULTIGRAVIDA IN SECOND TRIMESTER: Primary | ICD-10-CM

## 2021-09-27 PROCEDURE — 96372 THER/PROPH/DIAG INJ SC/IM: CPT | Performed by: OBSTETRICS & GYNECOLOGY

## 2021-09-27 PROCEDURE — 90471 IMMUNIZATION ADMIN: CPT | Performed by: OBSTETRICS & GYNECOLOGY

## 2021-09-27 PROCEDURE — 90686 IIV4 VACC NO PRSV 0.5 ML IM: CPT | Performed by: OBSTETRICS & GYNECOLOGY

## 2021-09-27 PROCEDURE — 99207 PR NO CHARGE NURSE ONLY: CPT

## 2021-09-27 PROCEDURE — 99207 PR PRENATAL VISIT: CPT | Performed by: OBSTETRICS & GYNECOLOGY

## 2021-09-27 RX ADMIN — HYDROXYPROGESTERONE CAPROATE 250 MG: 250 INJECTION INTRAMUSCULAR at 13:55

## 2021-09-27 NOTE — PROGRESS NOTES
Clinic Administered Medication Documentation    Administrations This Visit     HYDROXYprogesterone caproate (SOCO) intramuscular injection 250 mg     Admin Date  09/27/2021 Action  Given Dose  250 mg Route  Intramuscular Site  Left Ventrogluteal Administered By  Crystal De Guzman RN    Ordering Provider: Tana Ireland MD    NDC: 2982-2989-23    Lot#: [id number]    : [employer]    Patient Supplied?: No                  Injectable Medication Documentation    Patient was given Hydroxyprogesterone caproate. Prior to medication administration, verified patients identity using patient s name and date of birth. Please see MAR and medication order for additional information. Patient instructed to report any adverse reaction to staff immediately .      Was entire vial of medication used? Yes  Vial/Syringe: Single dose vial  Expiration Date:  2/2023  Was this medication supplied by the patient? No   Crystal De Guzman RN on 9/27/2021 at 1:58 PM

## 2021-09-30 ENCOUNTER — ANCILLARY PROCEDURE (OUTPATIENT)
Dept: ULTRASOUND IMAGING | Facility: CLINIC | Age: 36
End: 2021-09-30
Attending: OBSTETRICS & GYNECOLOGY
Payer: COMMERCIAL

## 2021-09-30 DIAGNOSIS — Q51.3 BICORNUATE UTERUS AFFECTING PREGNANCY IN SECOND TRIMESTER, ANTEPARTUM: ICD-10-CM

## 2021-09-30 DIAGNOSIS — O09.212 HIGH RISK PREGNANCY DUE TO HISTORY OF PRETERM LABOR IN SECOND TRIMESTER: ICD-10-CM

## 2021-09-30 DIAGNOSIS — O34.02 BICORNUATE UTERUS AFFECTING PREGNANCY IN SECOND TRIMESTER, ANTEPARTUM: ICD-10-CM

## 2021-09-30 DIAGNOSIS — O09.522 SUPERVISION OF HIGH RISK ELDERLY MULTIGRAVIDA IN SECOND TRIMESTER: ICD-10-CM

## 2021-09-30 NOTE — PROGRESS NOTES
Had her LII with MFM at 18+3 and normal anatomy with posterior placenta and normal and closed cervix  Their report never came to me so, and likely to DO as she was on for primary having done her NOB so didn't realize they rec doing q2-4 week cervical length U/S as they typically do those at [facility] nor growth scan every 4 weeks b/c of her bicornuate uterus  So cervical length scan wasn't ordered for today.  Since rec was q2-4 will have her return in 2 weeks for vaginal cervical length scan and then when back at 24 weeks will repeat it, do growth and see me  Will continue to do her weekly ariel. Is getting a local skin reaction that is hot and itchy and reassured this isn't an allergy as much as local skin reaction to the oil in ariel. Discussed ice, topical benadryl and cortisone and could do rx for triamcinolone if worsens  Having good FM. No cramping or ctx  Addressed si/sx of PTL that she should come in for. Admits at 27 and 30 weeks last time and then delivered 31+1.   If we get past 30 weeks and hasn't delivered, would recommend a c/s as had near CPD at even just 31 weeks so late  or full term would not be amenable to  attempt  Reviewed visitor policy at [facility], covid testing, masking, no children allowed as visitors now, etc  Flu shot today  Return 2 wks

## 2021-10-05 ENCOUNTER — ALLIED HEALTH/NURSE VISIT (OUTPATIENT)
Dept: NURSING | Facility: CLINIC | Age: 36
End: 2021-10-05
Payer: COMMERCIAL

## 2021-10-05 DIAGNOSIS — O09.92 SUPERVISION OF HIGH RISK PREGNANCY IN SECOND TRIMESTER: Primary | ICD-10-CM

## 2021-10-05 PROCEDURE — 99207 PR NO CHARGE NURSE ONLY: CPT

## 2021-10-05 PROCEDURE — 96372 THER/PROPH/DIAG INJ SC/IM: CPT | Performed by: OBSTETRICS & GYNECOLOGY

## 2021-10-05 RX ADMIN — HYDROXYPROGESTERONE CAPROATE 250 MG: 250 INJECTION INTRAMUSCULAR at 15:30

## 2021-10-05 NOTE — PROGRESS NOTES
Clinic Administered Medication Documentation    Administrations This Visit     HYDROXYprogesterone caproate (SOCO) intramuscular injection 250 mg     Admin Date  10/05/2021 Action  Given Dose  250 mg Route  Intramuscular Site  Left Ventrogluteal Administered By  Crystal De Guzman RN    Ordering Provider: Tana Ireland MD    NDC: 9860-5672-83    Lot#: [id number]    : [employer]    Patient Supplied?: No    Comments: R ventrogluteal still slightly bruised and some itching-will do R side next week                  Injectable Medication Documentation    Patient was given Hydroxyprogesterone caproate. Prior to medication administration, verified patients identity using patient s name and date of birth. Please see MAR and medication order for additional information. Patient instructed to report any adverse reaction to staff immediately .      Was entire vial of medication used? Yes  Vial/Syringe: Single dose vial  Expiration Date:  2/2023  Was this medication supplied by the patient? No   Crystal De Guzman RN on 10/5/2021 at 3:34 PM

## 2021-10-13 ENCOUNTER — ALLIED HEALTH/NURSE VISIT (OUTPATIENT)
Dept: NURSING | Facility: CLINIC | Age: 36
End: 2021-10-13
Payer: COMMERCIAL

## 2021-10-13 ENCOUNTER — ANCILLARY PROCEDURE (OUTPATIENT)
Dept: ULTRASOUND IMAGING | Facility: CLINIC | Age: 36
End: 2021-10-13
Payer: COMMERCIAL

## 2021-10-13 ENCOUNTER — PRENATAL OFFICE VISIT (OUTPATIENT)
Dept: OBGYN | Facility: CLINIC | Age: 36
End: 2021-10-13
Payer: COMMERCIAL

## 2021-10-13 VITALS — SYSTOLIC BLOOD PRESSURE: 98 MMHG | DIASTOLIC BLOOD PRESSURE: 58 MMHG | WEIGHT: 132 LBS | BODY MASS INDEX: 26 KG/M2

## 2021-10-13 DIAGNOSIS — O09.212 HIGH RISK PREGNANCY DUE TO HISTORY OF PRETERM LABOR IN SECOND TRIMESTER: ICD-10-CM

## 2021-10-13 DIAGNOSIS — Q51.3 BICORNUATE UTERUS AFFECTING PREGNANCY IN SECOND TRIMESTER, ANTEPARTUM: ICD-10-CM

## 2021-10-13 DIAGNOSIS — O09.522 SUPERVISION OF HIGH RISK ELDERLY MULTIGRAVIDA IN SECOND TRIMESTER: Primary | ICD-10-CM

## 2021-10-13 DIAGNOSIS — O34.02 BICORNUATE UTERUS AFFECTING PREGNANCY IN SECOND TRIMESTER, ANTEPARTUM: ICD-10-CM

## 2021-10-13 DIAGNOSIS — O09.92 SUPERVISION OF HIGH RISK PREGNANCY IN SECOND TRIMESTER: Primary | ICD-10-CM

## 2021-10-13 DIAGNOSIS — O09.522 SUPERVISION OF HIGH RISK ELDERLY MULTIGRAVIDA IN SECOND TRIMESTER: ICD-10-CM

## 2021-10-13 PROCEDURE — 99207 PR PRENATAL VISIT: CPT | Performed by: OBSTETRICS & GYNECOLOGY

## 2021-10-13 PROCEDURE — 99207 PR NO CHARGE NURSE ONLY: CPT

## 2021-10-13 PROCEDURE — 96372 THER/PROPH/DIAG INJ SC/IM: CPT | Performed by: OBSTETRICS & GYNECOLOGY

## 2021-10-13 PROCEDURE — 76817 TRANSVAGINAL US OBSTETRIC: CPT

## 2021-10-13 PROCEDURE — 76816 OB US FOLLOW-UP PER FETUS: CPT | Performed by: OBSTETRICS & GYNECOLOGY

## 2021-10-13 RX ADMIN — HYDROXYPROGESTERONE CAPROATE 250 MG: 250 INJECTION INTRAMUSCULAR at 14:36

## 2021-10-13 NOTE — PROGRESS NOTES
Clinic Administered Medication Documentation    Administrations This Visit     HYDROXYprogesterone caproate (SOCO) intramuscular injection 250 mg     Admin Date  10/13/2021 Action  Given Dose  250 mg Route  Intramuscular Site  Right Ventrogluteal Administered By  Tabitha Campbell RN    Ordering Provider: Tana Ireland MD    NDC: 5224-4569-88    Lot#: [id number]    : [employer]    Patient Supplied?: No                  Injectable Medication Documentation    Patient was given Soco. Prior to medication administration, verified patients identity using patient s name and date of birth. Please see MAR and medication order for additional information. Patient instructed to report any adverse reaction to staff immediately .      Was entire vial of medication used? Yes  Vial/Syringe: Single dose vial  Expiration Date:  2/2023  Was this medication supplied by the patient? No     Tabitha Campbell RN

## 2021-10-14 NOTE — PROGRESS NOTES
Today was actually supposed to just be a cervical length scan but it got confused b/c her LII was at 18 weeks so a full growth with cervical length was done  Normal growth at 1-2# 61%, cephalic, normal MVP and cervix is closed and long at 3.9cm  Got her ariel today  Has not had vaginal pressure or pain, no significant contractions or BH even. No VB or LOF or change in discharge  Feeling tons of good fM  Discussed c/s planning. Can't schedule anything before 39 weeks which is unlikely for her. If get's past 31 weeks then very likely would need one but could certainly just allow her to labor, when she does, and see what happens and if CPD issues then move to C/S or could schedule for 39 weeks and then move it up if/when PROM/PTL were to start/not be stopped. Will hold off on that for now  Normal weight gain and BP  Return 2 weeks with just cervix length, ariel and me and then at 28 weeks will do growth and GCT, etc  Discussed oral nifedipine long term vs short term and only would do it for sx control as not indicated for PTL termination

## 2021-10-18 ENCOUNTER — ALLIED HEALTH/NURSE VISIT (OUTPATIENT)
Dept: NURSING | Facility: CLINIC | Age: 36
End: 2021-10-18
Payer: COMMERCIAL

## 2021-10-18 DIAGNOSIS — Z87.51 HISTORY OF PRETERM DELIVERY: ICD-10-CM

## 2021-10-18 DIAGNOSIS — O09.92 SUPERVISION OF HIGH RISK PREGNANCY IN SECOND TRIMESTER: Primary | ICD-10-CM

## 2021-10-18 PROCEDURE — 96372 THER/PROPH/DIAG INJ SC/IM: CPT | Performed by: OBSTETRICS & GYNECOLOGY

## 2021-10-18 PROCEDURE — 99207 PR NO CHARGE NURSE ONLY: CPT

## 2021-10-18 RX ADMIN — HYDROXYPROGESTERONE CAPROATE 250 MG: 250 INJECTION INTRAMUSCULAR at 15:40

## 2021-10-18 NOTE — NURSING NOTE
Clinic Administered Medication Documentation    Administrations This Visit     HYDROXYprogesterone caproate (SOCO) intramuscular injection 250 mg     Admin Date  10/18/2021 Action  Given Dose  250 mg Route  Intramuscular Site  Left Ventrogluteal Administered By  Jenna Ashley RN    Ordering Provider: Tana Ireland MD    NDC: 7805-8144-84    Lot#: [id number]    : [employer]    Patient Supplied?: No                  Injectable Medication Documentation    Patient was given Hydroxyprogesterone Caproate. Prior to medication administration, verified patients identity using patient s name and date of birth. Please see MAR and medication order for additional information. Patient instructed to report any adverse reaction to staff immediately .      Was entire vial of medication used? Yes  Vial/Syringe: Single dose vial  Expiration Date:  11/1/2022  Was this medication supplied by the patient? No     Patient tolerated injection without incident and discharged home.  Ice applied to injection site.    Jenna Ashley RN on 10/18/2021 at 3:42 PM

## 2021-10-25 ENCOUNTER — TRANSFERRED RECORDS (OUTPATIENT)
Dept: HEALTH INFORMATION MANAGEMENT | Facility: CLINIC | Age: 36
End: 2021-10-25

## 2021-10-25 ENCOUNTER — PRENATAL OFFICE VISIT (OUTPATIENT)
Dept: OBGYN | Facility: CLINIC | Age: 36
End: 2021-10-25
Payer: COMMERCIAL

## 2021-10-25 ENCOUNTER — ALLIED HEALTH/NURSE VISIT (OUTPATIENT)
Dept: NURSING | Facility: CLINIC | Age: 36
End: 2021-10-25
Payer: COMMERCIAL

## 2021-10-25 VITALS — WEIGHT: 133 LBS | BODY MASS INDEX: 26.19 KG/M2 | DIASTOLIC BLOOD PRESSURE: 62 MMHG | SYSTOLIC BLOOD PRESSURE: 108 MMHG

## 2021-10-25 DIAGNOSIS — O34.02 BICORNUATE UTERUS AFFECTING PREGNANCY IN SECOND TRIMESTER, ANTEPARTUM: ICD-10-CM

## 2021-10-25 DIAGNOSIS — Q51.3 BICORNUATE UTERUS AFFECTING PREGNANCY IN SECOND TRIMESTER, ANTEPARTUM: ICD-10-CM

## 2021-10-25 DIAGNOSIS — O09.212 HIGH RISK PREGNANCY DUE TO HISTORY OF PRETERM LABOR IN SECOND TRIMESTER: ICD-10-CM

## 2021-10-25 DIAGNOSIS — O09.522 SUPERVISION OF HIGH RISK ELDERLY MULTIGRAVIDA IN SECOND TRIMESTER: Primary | ICD-10-CM

## 2021-10-25 DIAGNOSIS — O09.92 SUPERVISION OF HIGH RISK PREGNANCY IN SECOND TRIMESTER: Primary | ICD-10-CM

## 2021-10-25 PROCEDURE — 99207 PR NO CHARGE NURSE ONLY: CPT

## 2021-10-25 PROCEDURE — 96372 THER/PROPH/DIAG INJ SC/IM: CPT | Performed by: OBSTETRICS & GYNECOLOGY

## 2021-10-25 PROCEDURE — 99207 PR PRENATAL VISIT: CPT | Performed by: OBSTETRICS & GYNECOLOGY

## 2021-10-25 RX ADMIN — HYDROXYPROGESTERONE CAPROATE 250 MG: 250 INJECTION INTRAMUSCULAR at 14:49

## 2021-10-25 NOTE — PROGRESS NOTES
Clinic Administered Medication Documentation    Administrations This Visit     HYDROXYprogesterone caproate (SOCO) intramuscular injection 250 mg     Admin Date  10/25/2021 Action  Given Dose  250 mg Route  Intramuscular Site  Right Ventrogluteal Administered By  Tabitha Campbell RN    Ordering Provider: Tana Ireland MD    NDC: 3222-3215-33    Lot#: [id number]    : [employer]    Patient Supplied?: No                  Injectable Medication Documentation    Patient was given [place]. Prior to medication administration, verified patients identity using patient s name and date of birth. Please see MAR and medication order for additional information. Patient instructed to report any adverse reaction to staff immediately .      Was entire vial of medication used? Yes  Vial/Syringe: Single dose vial  Expiration Date:  02/28/2022  Was this medication supplied by the patient? No     Tabitha Campbell RN

## 2021-10-31 NOTE — PROGRESS NOTES
Patient was supposed to have cervical length only 2 weeks ago but had a growth and cervix length. Today had a cervical length both abd and TVUS at subrads and normal at 3.8cm. closed w/o funneling or beaking  Due for her ariel today.   Notes that last week thurs/friday and into the weekend, is having more ctx in the evening as trying to go to bed. Hasn't timed them as they are a bit more vague at start/stop. No pain at all but just really tight and can feel if come and release. This is starting earlier than it did with tanisha when it happened at 27 weeks for the first time  Wonders if after ariel there are fewer ctx and reported to her that though this isn't it's technical mech of action but most report that this is the case and that BH/Ctx do ramp up further away from last dose and prior to next one  Reviewed BH in a multip vs true PTL and with her history and should call/come in if getting more than 5 timeable ctx in an hour.  Should try to hydrate, change positions, if night time can do tylenol PM or just benadryl, warm tub soak, etc. If those things don't stop ctx should be seen given her earlier delivery  Good FM  Discussed c/s and  again. Have not scheduled a c/s as can't do it unti 39 weeks and unrealistic that she will go that far. However, given the CPD with just a 31 week delivery and 3-5# baby, if makes it past 32 weeks, realistically should plan for a c/s.   Can assess pelvis and dilation and PTL if/when that should happen to see if pelvis would continue to stretch and accommodate >31-32 week GA for  but have low threshhold for c/s delivery to avoid FTP, dystocia, extensive tearing and patient understands reasoning though would love to have another  if she could  Return 4 weeks with gCT, tdap, cbc but could return in 2 weeks if any change in sx or concerns given first PTL admit was 27 weeks with her first

## 2021-11-01 ENCOUNTER — ALLIED HEALTH/NURSE VISIT (OUTPATIENT)
Dept: NURSING | Facility: CLINIC | Age: 36
End: 2021-11-01
Payer: COMMERCIAL

## 2021-11-01 ENCOUNTER — TELEPHONE (OUTPATIENT)
Dept: OBGYN | Facility: CLINIC | Age: 36
End: 2021-11-01

## 2021-11-01 DIAGNOSIS — Z87.51 HISTORY OF PRETERM DELIVERY: Primary | ICD-10-CM

## 2021-11-01 PROCEDURE — 99207 PR NO CHARGE NURSE ONLY: CPT

## 2021-11-01 PROCEDURE — 96372 THER/PROPH/DIAG INJ SC/IM: CPT | Performed by: OBSTETRICS & GYNECOLOGY

## 2021-11-01 RX ADMIN — HYDROXYPROGESTERONE CAPROATE 250 MG: 250 INJECTION INTRAMUSCULAR at 16:03

## 2021-11-01 NOTE — TELEPHONE ENCOUNTER
Type of Paperwork received:  FMLA     Date Rcvd:  11/1/2021    Rcvd From (Company name): [id number]    Provider:  Shu    Placed on Provider Cart Date:  11/1/2021

## 2021-11-01 NOTE — PROGRESS NOTES
Clinic Administered Medication Documentation    Administrations This Visit     HYDROXYprogesterone caproate (SOCO) intramuscular injection 250 mg     Admin Date  11/01/2021 Action  Given Dose  250 mg Route  Intramuscular Site  Right Ventrogluteal Administered By  Crystal De Guzman RN    Ordering Provider: Tana Ireland MD    NDC: 4133-6082-19    Lot#: [id number]    : [employer]    Patient Supplied?: No                  Injectable Medication Documentation    Patient was given Hydroxyprogesterone. Prior to medication administration, verified patients identity using patient s name and date of birth. Please see MAR and medication order for additional information. Patient instructed to report any adverse reaction to staff immediately .      Was entire vial of medication used? Yes  Vial/Syringe: Single dose vial  Expiration Date:  2/23  Was this medication supplied by the patient? No   Crystal De Guzman RN on 11/1/2021 at 4:05 PM

## 2021-11-10 ENCOUNTER — ALLIED HEALTH/NURSE VISIT (OUTPATIENT)
Dept: NURSING | Facility: CLINIC | Age: 36
End: 2021-11-10
Payer: COMMERCIAL

## 2021-11-10 DIAGNOSIS — Z87.51 HISTORY OF PRETERM DELIVERY: Primary | ICD-10-CM

## 2021-11-10 PROCEDURE — 96372 THER/PROPH/DIAG INJ SC/IM: CPT | Performed by: OBSTETRICS & GYNECOLOGY

## 2021-11-10 PROCEDURE — 99207 PR NO CHARGE NURSE ONLY: CPT

## 2021-11-10 RX ADMIN — HYDROXYPROGESTERONE CAPROATE 250 MG: 250 INJECTION INTRAMUSCULAR at 16:00

## 2021-11-10 NOTE — NURSING NOTE
Clinic Administered Medication Documentation    Administrations This Visit     HYDROXYprogesterone caproate (SOCO) intramuscular injection 250 mg     Admin Date  11/10/2021 Action  Given Dose  250 mg Route  Intramuscular Site  Left Ventrogluteal Administered By  Jenna Ashley RN    Ordering Provider: Tana Ireland MD    NDC: 1042-2723-65    Lot#: [id number]    : [employer]    Patient Supplied?: No                  Injectable Medication Documentation    Patient was given hydroxyprogesterone caproate. Prior to medication administration, verified patients identity using patient s name and date of birth. Please see MAR and medication order for additional information. Patient instructed to report any adverse reaction to staff immediately .      Was entire vial of medication used? Yes  Vial/Syringe: Single dose vial  Expiration Date:  2/25/2023  Was this medication supplied by the patient? No     Patient tolerated injection without incident and discharged home.  Jenna Ashley RN on 11/10/2021 at 4:02 PM

## 2021-11-12 ENCOUNTER — TELEPHONE (OUTPATIENT)
Dept: OBGYN | Facility: CLINIC | Age: 36
End: 2021-11-12
Payer: COMMERCIAL

## 2021-11-12 ENCOUNTER — HOSPITAL ENCOUNTER (OUTPATIENT)
Facility: CLINIC | Age: 36
Discharge: HOME | End: 2021-11-12
Attending: OBSTETRICS & GYNECOLOGY | Admitting: OBSTETRICS & GYNECOLOGY
Payer: COMMERCIAL

## 2021-11-12 VITALS — SYSTOLIC BLOOD PRESSURE: 128 MMHG | RESPIRATION RATE: 16 BRPM | TEMPERATURE: 97.7 F | DIASTOLIC BLOOD PRESSURE: 78 MMHG

## 2021-11-12 LAB
ALBUMIN UR-MCNC: NEGATIVE MG/DL
APPEARANCE UR: CLEAR
BILIRUB UR QL STRIP: NEGATIVE
COLOR UR AUTO: ABNORMAL
FFN SPECIMEN INTEGRITY: NORMAL
FIBRONECTIN FETAL VAG QL: NEGATIVE
GLUCOSE UR STRIP-MCNC: NEGATIVE MG/DL
HGB UR QL STRIP: NEGATIVE
KETONES UR STRIP-MCNC: 10 MG/DL
LEUKOCYTE ESTERASE UR QL STRIP: NEGATIVE
MUCOUS THREADS #/AREA URNS LPF: PRESENT /LPF
NITRATE UR QL: NEGATIVE
PH UR STRIP: 6.5 [PH] (ref 5–7)
RBC URINE: 0 /HPF
SP GR UR STRIP: 1.01 (ref 1–1.03)
SQUAMOUS EPITHELIAL: 1 /HPF
UROBILINOGEN UR STRIP-MCNC: NORMAL MG/DL
WBC URINE: <1 /HPF

## 2021-11-12 PROCEDURE — 82731 ASSAY OF FETAL FIBRONECTIN: CPT | Performed by: OBSTETRICS & GYNECOLOGY

## 2021-11-12 PROCEDURE — 59025 FETAL NON-STRESS TEST: CPT

## 2021-11-12 PROCEDURE — G0463 HOSPITAL OUTPT CLINIC VISIT: HCPCS

## 2021-11-12 PROCEDURE — 81001 URINALYSIS AUTO W/SCOPE: CPT | Performed by: OBSTETRICS & GYNECOLOGY

## 2021-11-12 PROCEDURE — 999N000105 HC STATISTIC NO DOCUMENTATION TO SUPPORT CHARGE

## 2021-11-12 PROCEDURE — 258N000003 HC RX IP 258 OP 636: Performed by: OBSTETRICS & GYNECOLOGY

## 2021-11-12 RX ORDER — ONDANSETRON 4 MG/1
4 TABLET, ORALLY DISINTEGRATING ORAL EVERY 6 HOURS PRN
Status: DISCONTINUED | OUTPATIENT
Start: 2021-11-12 | End: 2021-11-12 | Stop reason: HOSPADM

## 2021-11-12 RX ORDER — POLYETHYLENE GLYCOL 3350 17 G/17G
1 POWDER, FOR SOLUTION ORAL DAILY
COMMUNITY
End: 2023-03-22

## 2021-11-12 RX ORDER — PROCHLORPERAZINE 25 MG
25 SUPPOSITORY, RECTAL RECTAL EVERY 12 HOURS PRN
Status: DISCONTINUED | OUTPATIENT
Start: 2021-11-12 | End: 2021-11-12 | Stop reason: HOSPADM

## 2021-11-12 RX ORDER — PROCHLORPERAZINE MALEATE 5 MG
10 TABLET ORAL EVERY 6 HOURS PRN
Status: DISCONTINUED | OUTPATIENT
Start: 2021-11-12 | End: 2021-11-12 | Stop reason: HOSPADM

## 2021-11-12 RX ORDER — METOCLOPRAMIDE HYDROCHLORIDE 5 MG/ML
10 INJECTION INTRAMUSCULAR; INTRAVENOUS EVERY 6 HOURS PRN
Status: DISCONTINUED | OUTPATIENT
Start: 2021-11-12 | End: 2021-11-12 | Stop reason: HOSPADM

## 2021-11-12 RX ORDER — METOCLOPRAMIDE 10 MG/1
10 TABLET ORAL EVERY 6 HOURS PRN
Status: DISCONTINUED | OUTPATIENT
Start: 2021-11-12 | End: 2021-11-12 | Stop reason: HOSPADM

## 2021-11-12 RX ORDER — ONDANSETRON 2 MG/ML
4 INJECTION INTRAMUSCULAR; INTRAVENOUS EVERY 6 HOURS PRN
Status: DISCONTINUED | OUTPATIENT
Start: 2021-11-12 | End: 2021-11-12 | Stop reason: HOSPADM

## 2021-11-12 RX ADMIN — SODIUM CHLORIDE, POTASSIUM CHLORIDE, SODIUM LACTATE AND CALCIUM CHLORIDE 1000 ML: 600; 310; 30; 20 INJECTION, SOLUTION INTRAVENOUS at 18:19

## 2021-11-12 NOTE — TELEPHONE ENCOUNTER
Cramping all day today-states belly gets hard every 2 mins or so   Went home from work to her mother's house felt a little better but still having contractions/cramping    Denies LOF, vaginal bleeding  Active baby     Hx of PTL/delivery, bicornuate uterus-needs eval in [facility]  Pt will go to [facility]-they have been notified.     Will route to Dr. Ireland who is her primary OB and Dr. Birmingham who is on call    Crystal De Guzman RN on 11/12/2021 at 4:20 PM

## 2021-11-13 NOTE — DISCHARGE INSTRUCTIONS
Discharge Instruction for Undelivered Patients      You were seen for: Labor Assessment  We Consulted: Dr Birmingham  You had (Test or Medicine):Fetal and uterine monitoring, labwork     Diet:   Drink 8 to 12 glasses of liquids (milk, juice, water) every day.  You may eat meals and snacks.     Activity:  Call your doctor or nurse midwife if your baby is moving less than usual.     Call your provider if you notice:  Swelling in your face or increased swelling in your hands or legs.  Headaches that are not relieved by Tylenol (acetaminophen).  Changes in your vision (blurring: seeing spots or stars.)  Nausea (sick to your stomach) and vomiting (throwing up).   Weight gain of 5 pounds or more per week.  Heartburn that doesn't go away.  Signs of bladder infection: pain when you urinate (use the toilet), need to go more often and more urgently.  The bag of kingsley (rupture of membranes) breaks, or you notice leaking in your underwear.  Bright red blood in your underwear.  Abdominal (lower belly) or stomach pain.  Second (plus) baby: Contractions (tightening) less than 10 minutes apart and getting stronger.  *If less than 34 weeks: Contractions (tightening) more than 6 times in one hour.  Increase or change in vaginal discharge (note the color and amount)    Follow-up:  As scheduled in the clinic, sooner if needed.

## 2021-11-13 NOTE — PLAN OF CARE
Annie is a 35yo  26w4d presents with cxns that started at 1300 at work today, q 2-10 mins. Have lessened in intensity since coming in the car. Denies bleeding, LOF, recent intercourse, trauma to her abdomen, changes in bowel or bladder. 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 Delivered at 31wks. . Is taking Zayda. Is nervous due to her hx. Here with mother who is supportive. Her  is 2 hours away, hunting. Pt states she has been eating well, staying hydrated.     -Dr Birmingham updated. Orders received.     -FFN collected via speculum exam. SVE 1cm external ox, closed internal os, thick, cannot tell presenting part. -3 station    1702-Dr Birmingham updated with uterine activity, lab results. Discharge to home.    -Discharge instructions and precautions reviewed with pt. States understanding. Discharged to home.

## 2021-11-15 DIAGNOSIS — Z36.9 ENCOUNTER FOR ANTENATAL SCREENING OF MOTHER: Primary | ICD-10-CM

## 2021-11-15 DIAGNOSIS — Z23 NEED FOR TDAP VACCINATION: ICD-10-CM

## 2021-11-17 ENCOUNTER — ALLIED HEALTH/NURSE VISIT (OUTPATIENT)
Dept: NURSING | Facility: CLINIC | Age: 36
End: 2021-11-17
Payer: COMMERCIAL

## 2021-11-17 DIAGNOSIS — O09.90 SUPERVISION OF HIGH-RISK PREGNANCY: Primary | ICD-10-CM

## 2021-11-17 PROCEDURE — 99207 PR NO CHARGE NURSE ONLY: CPT

## 2021-11-17 PROCEDURE — 96372 THER/PROPH/DIAG INJ SC/IM: CPT | Performed by: OBSTETRICS & GYNECOLOGY

## 2021-11-17 RX ADMIN — HYDROXYPROGESTERONE CAPROATE 250 MG: 250 INJECTION INTRAMUSCULAR at 16:06

## 2021-11-17 NOTE — PROGRESS NOTES
Clinic Administered Medication Documentation    Administrations This Visit     HYDROXYprogesterone caproate (SOCO) intramuscular injection 250 mg     Admin Date  11/17/2021 Action  Given Dose  250 mg Route  Intramuscular Site  Right Ventrogluteal Administered By  Tabitha Campbell RN    Ordering Provider: Tana Ireland MD    NDC: 71783-155-84    Lot#: [id number]    : [employer]    Patient Supplied?: No                  Injectable Medication Documentation    Patient was given Soco. Prior to medication administration, verified patients identity using patient s name and date of birth. Please see MAR and medication order for additional information. Patient instructed to report any adverse reaction to staff immediately .      Was entire vial of medication used? Yes  Vial/Syringe: Single dose vial  Expiration Date:  12/21  Was this medication supplied by the patient? No     Tabitha Campbell RN

## 2021-11-18 PROCEDURE — 87210 SMEAR WET MOUNT SALINE/INK: CPT | Performed by: OBSTETRICS & GYNECOLOGY

## 2021-11-19 ENCOUNTER — TELEPHONE (OUTPATIENT)
Dept: OBGYN | Facility: CLINIC | Age: 36
End: 2021-11-19
Payer: COMMERCIAL

## 2021-11-19 ENCOUNTER — HOSPITAL ENCOUNTER (OUTPATIENT)
Facility: CLINIC | Age: 36
End: 2021-11-19
Admitting: OBSTETRICS & GYNECOLOGY
Payer: COMMERCIAL

## 2021-11-19 ENCOUNTER — HOSPITAL ENCOUNTER (OUTPATIENT)
Facility: CLINIC | Age: 36
Discharge: HOME | End: 2021-11-19
Attending: OBSTETRICS & GYNECOLOGY | Admitting: OBSTETRICS & GYNECOLOGY
Payer: COMMERCIAL

## 2021-11-19 VITALS — SYSTOLIC BLOOD PRESSURE: 120 MMHG | RESPIRATION RATE: 18 BRPM | DIASTOLIC BLOOD PRESSURE: 83 MMHG | TEMPERATURE: 98.8 F

## 2021-11-19 DIAGNOSIS — O47.00 PRETERM CONTRACTIONS: ICD-10-CM

## 2021-11-19 DIAGNOSIS — B96.89 BV (BACTERIAL VAGINOSIS): Primary | ICD-10-CM

## 2021-11-19 DIAGNOSIS — N76.0 BV (BACTERIAL VAGINOSIS): Primary | ICD-10-CM

## 2021-11-19 LAB
ALBUMIN UR-MCNC: NEGATIVE MG/DL
APPEARANCE UR: CLEAR
BACTERIA #/AREA URNS HPF: ABNORMAL /HPF
BILIRUB UR QL STRIP: NEGATIVE
CLUE CELLS: PRESENT
COLOR UR AUTO: ABNORMAL
FFN SPECIMEN INTEGRITY: NORMAL
FIBRONECTIN FETAL VAG QL: NEGATIVE
GLUCOSE UR STRIP-MCNC: NEGATIVE MG/DL
HGB UR QL STRIP: NEGATIVE
KETONES UR STRIP-MCNC: NEGATIVE MG/DL
LEUKOCYTE ESTERASE UR QL STRIP: NEGATIVE
NITRATE UR QL: NEGATIVE
PH UR STRIP: 6.5 [PH] (ref 5–7)
RBC URINE: <1 /HPF
SP GR UR STRIP: 1 (ref 1–1.03)
SQUAMOUS EPITHELIAL: 9 /HPF
TRICHOMONAS, WET PREP: ABNORMAL
UROBILINOGEN UR STRIP-MCNC: NORMAL MG/DL
WBC URINE: 3 /HPF
WBC'S/HIGH POWER FIELD, WET PREP: ABNORMAL
YEAST, WET PREP: ABNORMAL

## 2021-11-19 PROCEDURE — G0463 HOSPITAL OUTPT CLINIC VISIT: HCPCS

## 2021-11-19 PROCEDURE — 81001 URINALYSIS AUTO W/SCOPE: CPT | Performed by: OBSTETRICS & GYNECOLOGY

## 2021-11-19 PROCEDURE — 82731 ASSAY OF FETAL FIBRONECTIN: CPT | Performed by: OBSTETRICS & GYNECOLOGY

## 2021-11-19 RX ORDER — METRONIDAZOLE 500 MG/1
500 TABLET ORAL 2 TIMES DAILY
Qty: 14 TABLET | Refills: 0 | Status: SHIPPED | OUTPATIENT
Start: 2021-11-19 | End: 2021-11-26

## 2021-11-19 RX ORDER — HYDROXYZINE PAMOATE 50 MG/1
50-100 CAPSULE ORAL 3 TIMES DAILY PRN
Qty: 30 CAPSULE | Refills: 1 | Status: ON HOLD | OUTPATIENT
Start: 2021-11-19 | End: 2022-02-12

## 2021-11-19 NOTE — TELEPHONE ENCOUNTER
"27w4d  Started having cx's after lunch; home from work all week.  Last wk had negative FFN in [facility] and sent home on bed rest-no work.    Has had contractions now for an hour, every 10\".  Sitting in a tub and contractions not going away. Uterus feeling tight and feeling stronger than she had experienced on Sunday and Wednesday of this week  No LOF and VB  Reporting active FM.  Trying to hydrate as well.    Instructed pt to be evaluated in [facility].    Pt verbalized understanding, in agreement with plan, and voiced no further questions.    [facility] notified pt coming and to contact Dr Ireland.    Jenna Ashley, RN on 11/19/2021 at 2:41 PM    "

## 2021-11-19 NOTE — PLAN OF CARE
"1602-Pt arrived ambulatory and accompanied by her  Jaya stating \"I'm lisset\" Pt to [facility] room 1, oriented to room and call light. EFM/toco monitors applied with verbal consent.  t's 145    Pt is a , 27w4d of Dr. Ireland with a h/o PPROM and delivery at 31 weeks. Pt states that she has been lisset over the past few days and was seen here on  for evaluation of  labor. Pt reports UCs every 10 minutes at home. She took a bath and they decreased in intensity but was advised to come and be evaluated. Pt denies VB or LOF, symptoms of preeclampsia, bladder or bowel changes, or symptoms of illness.    - Dr. Maya updated regarding pts report, uterine activity, t's CAT I. Orders received for FFN, SVE, UA. Plan discussed with pt. Pt agrees. Speculum exam performed with pts consent, FFN collected. White milky discharge was noted upon exam. A wet prep was also collected. UA collected and sent.     -SVE performed with verbal consent. Unchanged from previous exam on the . No fluid or bleeding  visualized.     Dr. Maya updated regarding lab results and SVE. Discharge orders received. MD to call in a rx for Bacterial Vaginosis. Discharge instructions reviewed with the pt and her . Both verbalize understanding and agreement. Pt d/c'd ambulatory.    "

## 2021-11-20 NOTE — DISCHARGE INSTRUCTIONS
Discharge Instruction for Undelivered Patients      You were seen for: Labor Assessment  We Consulted: Dr. Porsche Maya  You had (Test or Medicine):Fetal and uterine monitoring, Fetal fibronectin, cervical exam, UA, Wet Prep     Diet:   Drink 8 to 12 glasses of liquids (milk, juice, water) every day.  You may eat meals and snacks.     Activity:  Count fetal kicks everyday (see handout)  Call your doctor or nurse midwife if your baby is moving less than usual.     Call your provider if you notice:  Swelling in your face or increased swelling in your hands or legs.  Headaches that are not relieved by Tylenol (acetaminophen).  Changes in your vision (blurring: seeing spots or stars.)  Nausea (sick to your stomach) and vomiting (throwing up).   Weight gain of 5 pounds or more per week.  Heartburn that doesn't go away.  Signs of bladder infection: pain when you urinate (use the toilet), need to go more often and more urgently.  The bag of kingsley (rupture of membranes) breaks, or you notice leaking in your underwear.  Bright red blood in your underwear.  Abdominal (lower belly) or stomach pain.  *If less than 34 weeks: Contractions (tightening) more than 6 times in one hour.  Increase or change in vaginal discharge (note the color and amount)      Follow-up:  As scheduled in the clinic    prescription for Flagyl at [facility]

## 2021-11-22 ENCOUNTER — ANCILLARY PROCEDURE (OUTPATIENT)
Dept: ULTRASOUND IMAGING | Facility: CLINIC | Age: 36
End: 2021-11-22
Payer: COMMERCIAL

## 2021-11-22 ENCOUNTER — LAB (OUTPATIENT)
Dept: LAB | Facility: CLINIC | Age: 36
End: 2021-11-22
Payer: COMMERCIAL

## 2021-11-22 ENCOUNTER — PRENATAL OFFICE VISIT (OUTPATIENT)
Dept: OBGYN | Facility: CLINIC | Age: 36
End: 2021-11-22
Payer: COMMERCIAL

## 2021-11-22 ENCOUNTER — ALLIED HEALTH/NURSE VISIT (OUTPATIENT)
Dept: NURSING | Facility: CLINIC | Age: 36
End: 2021-11-22
Payer: COMMERCIAL

## 2021-11-22 VITALS
SYSTOLIC BLOOD PRESSURE: 125 MMHG | HEIGHT: 60 IN | TEMPERATURE: 98.4 F | WEIGHT: 137 LBS | HEART RATE: 108 BPM | DIASTOLIC BLOOD PRESSURE: 77 MMHG | BODY MASS INDEX: 26.9 KG/M2

## 2021-11-22 DIAGNOSIS — O09.523 SUPERVISION OF HIGH RISK ELDERLY MULTIGRAVIDA IN THIRD TRIMESTER: Primary | ICD-10-CM

## 2021-11-22 DIAGNOSIS — G47.01 INSOMNIA DUE TO MEDICAL CONDITION: ICD-10-CM

## 2021-11-22 DIAGNOSIS — O09.519 SUPERVISION OF HIGH-RISK PREGNANCY OF ELDERLY PRIMIGRAVIDA: ICD-10-CM

## 2021-11-22 DIAGNOSIS — Z36.9 ENCOUNTER FOR ANTENATAL SCREENING OF MOTHER: ICD-10-CM

## 2021-11-22 DIAGNOSIS — O09.213 HIGH RISK PREGNANCY DUE TO HISTORY OF PRETERM LABOR IN THIRD TRIMESTER: ICD-10-CM

## 2021-11-22 DIAGNOSIS — Q51.3 BICORNUATE UTERUS AFFECTING PREGNANCY IN THIRD TRIMESTER, ANTEPARTUM: ICD-10-CM

## 2021-11-22 DIAGNOSIS — O47.9 BRAXTON HICK'S CONTRACTION: ICD-10-CM

## 2021-11-22 DIAGNOSIS — O09.93 SUPERVISION OF HIGH RISK PREGNANCY IN THIRD TRIMESTER: Primary | ICD-10-CM

## 2021-11-22 DIAGNOSIS — O34.03 BICORNUATE UTERUS AFFECTING PREGNANCY IN THIRD TRIMESTER, ANTEPARTUM: ICD-10-CM

## 2021-11-22 DIAGNOSIS — Z23 NEED FOR TDAP VACCINATION: ICD-10-CM

## 2021-11-22 LAB
ERYTHROCYTE [DISTWIDTH] IN BLOOD BY AUTOMATED COUNT: 11.8 % (ref 10–15)
GLUCOSE 1H P 50 G GLC PO SERPL-MCNC: 86 MG/DL (ref 70–129)
HCT VFR BLD AUTO: 40.3 % (ref 35–47)
HGB BLD-MCNC: 13.6 G/DL (ref 11.7–15.7)
MCH RBC QN AUTO: 31.2 PG (ref 26.5–33)
MCHC RBC AUTO-ENTMCNC: 33.7 G/DL (ref 31.5–36.5)
MCV RBC AUTO: 92 FL (ref 78–100)
PLATELET # BLD AUTO: 184 10E3/UL (ref 150–450)
RBC # BLD AUTO: 4.36 10E6/UL (ref 3.8–5.2)
WBC # BLD AUTO: 11.7 10E3/UL (ref 4–11)

## 2021-11-22 PROCEDURE — 76817 TRANSVAGINAL US OBSTETRIC: CPT | Performed by: OBSTETRICS & GYNECOLOGY

## 2021-11-22 PROCEDURE — 90715 TDAP VACCINE 7 YRS/> IM: CPT | Performed by: OBSTETRICS & GYNECOLOGY

## 2021-11-22 PROCEDURE — 99207 PR NO CHARGE NURSE ONLY: CPT

## 2021-11-22 PROCEDURE — 90471 IMMUNIZATION ADMIN: CPT | Performed by: OBSTETRICS & GYNECOLOGY

## 2021-11-22 PROCEDURE — 82950 GLUCOSE TEST: CPT

## 2021-11-22 PROCEDURE — 76816 OB US FOLLOW-UP PER FETUS: CPT | Performed by: OBSTETRICS & GYNECOLOGY

## 2021-11-22 PROCEDURE — 99207 PR PRENATAL VISIT: CPT | Performed by: OBSTETRICS & GYNECOLOGY

## 2021-11-22 PROCEDURE — 96372 THER/PROPH/DIAG INJ SC/IM: CPT | Performed by: OBSTETRICS & GYNECOLOGY

## 2021-11-22 PROCEDURE — 85027 COMPLETE CBC AUTOMATED: CPT

## 2021-11-22 PROCEDURE — 36415 COLL VENOUS BLD VENIPUNCTURE: CPT

## 2021-11-22 RX ADMIN — HYDROXYPROGESTERONE CAPROATE 250 MG: 250 INJECTION INTRAMUSCULAR at 13:44

## 2021-11-22 ASSESSMENT — MIFFLIN-ST. JEOR: SCORE: 1233.96

## 2021-11-22 NOTE — PROGRESS NOTES
"BPD 7.26 cm 29w1d 74.7%   HC 26.88 cm 29w2d 60.8%   AC 23.98 cm 28w2d 50.7%   FL 5.18 cm 27w5d 25.2%   EFW (lbs/oz) 2 lbs               10ozs       EFW (g) 1192 g 45.5%        Fetal heart rate: 153 bpm  Fetal presentation: Cephalic  Amniotic fluid: 5.48cm MVP    Patient's vitals were obtained but erroneously not entered but were normal.  Patient has had a really stressful couple of weeks. 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 Coming often and close together and then has come in to [facility] x2. Cervix has been closed both times though did have some irritatbility and irregular ctx noted.  Patient decided that she wasn't going to return to work until after maternity leave. Discussed that we know that bed rest is not indicated for PTL prevention though certainly it helps to minimize sx BH that can prompt visits and health care time which is also not desireable.  Patient and her  report that she is having a lot of anxiety and \"PTSD\" about her PTL and weeks in the NICU with tanisha. Very short staffed at work so no breaks. 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 Does not need her job financially and have decided that she should take a leave for both physical and emotional/mental benefit  Discussed PTL recurrence at this time last time as also triggering. If/when gets past her previous GA for delivery will likely feel better  Reassured her that her cervix was long and closed on U/S and she is getting ariel this time also  Discussed when to come in and what things to try at home before needing to do so  Has not worked this week so far and already has had significant fewer ctx  Also given vistaril at last mac visit and working great for sleep. Discussed taking small dose during the day if has ctx and larger at night for sleep. Has capsules so can't take a half tab so will send in " new rx for tablets that she can split or double up as needed  Will write a letter for work. Though can't state that it is medically necessary for her to take a leave, since clearly bedrest is not being recommended. Can advise work as to the medical facts and regardless would be doing unpaid leave  If/when she makes it to 32 weeks, would likely then schedule a c/s for 39 due to the CPD at even 31 weeks.   If went in to labor before then, could certainly assess pelvic space and dilation/station and then cancel a c/s if felt it was appropriate to try delivering vaginally again  Discussed when BMZ is indicated so wouldn't give it preventatively unless supsicion for delivery within 7 days  Return 2 weeks, no cervix length U/S needed any longer and would do digital exams only if indicated  Growth repeat at 32/36  GCT, cbc, tdap today. Got her covid booster yesterday and had some s.e but already improving

## 2021-11-22 NOTE — NURSING NOTE
Syphilis is a sexually transmitted disease that can cause birth defects in the babies of untreated mothers. Every pregnant patient is tested for syphilis early in each pregnancy as part of the routine lab work. The [place] Department of [facility] has seen an increase in the rate of syphilis in [place]. The [facility] now recommends testing for syphilis 3 times during a pregnancy, the new prenatal visit, 28 weeks and when admitted for delivery. Patient declines lab testing for syphilis.  Porsche Jefferson MA on 11/22/2021 at 1:51 PM

## 2021-11-22 NOTE — Clinical Note
Can we contact chely and find out how she'd like this letter? Fax to school/to her/[facility]etc?

## 2021-11-22 NOTE — PROGRESS NOTES
Clinic Administered Medication Documentation    Administrations This Visit     HYDROXYprogesterone caproate (SOCO) intramuscular injection 250 mg     Admin Date  11/22/2021 Action  Given Dose  250 mg Route  Intramuscular Site   Administered By  Crystal De Guzman RN    Ordering Provider: Tana Ireland MD    NDC: 7324-0895-50    Lot#: [id number]    : [employer]    Patient Supplied?: No                  Injectable Medication Documentation    Patient was given Hydroxyprogesterone. Prior to medication administration, verified patients identity using patient s name and date of birth. Please see MAR and medication order for additional information. Patient instructed to remain in clinic for 15 minutes and report any adverse reaction to staff immediately .      Was entire vial of medication used? Yes  Vial/Syringe: Single dose vial  Expiration Date:  11/22  Was this medication supplied by the patient? No   Crystal De Guzman RN on 11/22/2021 at 1:47 PM

## 2021-11-22 NOTE — NURSING NOTE
Prior to immunization administration, verified patients identity using patient s name and date of birth. Please see Immunization Activity for additional information.     Screening Questionnaire for Adult Immunization    Are you sick today?   No   Do you have allergies to medications, food, a vaccine component or latex?   No   Have you ever had a serious reaction after receiving a vaccination?   No   Do you have a long-term health problem with heart, lung, kidney, or metabolic disease (e.g., diabetes), asthma, a blood disorder, no spleen, complement component deficiency, a cochlear implant, or a spinal fluid leak?  Are you on long-term aspirin therapy?   No   Do you have cancer, leukemia, HIV/AIDS, or any other immune system problem?   No   Do you have a parent, brother, or sister with an immune system problem?   No   In the past 3 months, have you taken medications that affect  your immune system, such as prednisone, other steroids, or anticancer drugs; drugs for the treatment of rheumatoid arthritis, Crohn s disease, or psoriasis; or have you had radiation treatments?   No   Have you had a seizure, or a brain or other nervous system problem?   No   During the past year, have you received a transfusion of blood or blood    products, or been given immune (gamma) globulin or antiviral drug?   No   For women: Are you pregnant or is there a chance you could become       pregnant during the next month?   Yes   Have you received any vaccinations in the past 4 weeks?   No     Immunization questionnaire was positive for at least one answer.  Notified Dr. Ireland.        Per orders of Dr. Ireland, injection of Adacel given by Porsche Jefferson CMA. Patient instructed to remain in clinic for 15 minutes afterwards, and to report any adverse reaction to me immediately.       Screening performed by Porsche Jefferson CMA on 11/22/2021 at 5:01 PM.

## 2021-11-22 NOTE — LETTER
To whom it may concern,    I am writing this letter on behalf of my patient Annie Clark ( 87). Annie is currently pregnant at 28 weeks. With her previous pregnancy she began to have  labor at 27 weeks and went on to deliver her first quite prematurely at only 31 weeks.    With her current pregnancy she is having significant contractions that worsen as the work day progresses. 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  Though bedrest is not indicated for history of  delivery and current  contractions, I do feel that even the episodes that are not true  labor are leading to many hospital visits and office appts for evaluation and leading to a significant amount of mental and emotional stress given her history.    Especially at this critical time, when she previously had  labor and delivery at the same weeks of gestation, I have advised her to consider taking time off from work to minimize risk of recurrent  delivery, but moreso the many triggers for needing to seek medical care that may be less necessary if not on her feet and working all day.    If there is any additional information that I can provide, please let me know.    Sincerely,    MD SHEA MejiaOROSENDA  22

## 2021-11-23 ENCOUNTER — DOCUMENTATION ONLY (OUTPATIENT)
Dept: OBGYN | Facility: CLINIC | Age: 36
End: 2021-11-23
Payer: COMMERCIAL

## 2021-11-24 RX ORDER — HYDROXYZINE HYDROCHLORIDE 50 MG/1
TABLET, FILM COATED ORAL
Qty: 100 TABLET | Refills: 1 | Status: ON HOLD | OUTPATIENT
Start: 2021-11-24 | End: 2022-02-12

## 2021-11-29 ENCOUNTER — ALLIED HEALTH/NURSE VISIT (OUTPATIENT)
Dept: NURSING | Facility: CLINIC | Age: 36
End: 2021-11-29
Payer: COMMERCIAL

## 2021-11-29 DIAGNOSIS — Z87.51 HISTORY OF PRETERM DELIVERY: Primary | ICD-10-CM

## 2021-11-29 PROCEDURE — 99207 PR NO CHARGE NURSE ONLY: CPT

## 2021-11-29 PROCEDURE — 96372 THER/PROPH/DIAG INJ SC/IM: CPT | Performed by: OBSTETRICS & GYNECOLOGY

## 2021-11-29 RX ADMIN — HYDROXYPROGESTERONE CAPROATE 250 MG: 250 INJECTION INTRAMUSCULAR at 15:44

## 2021-11-29 NOTE — PROGRESS NOTES
Clinic Administered Medication Documentation    Administrations This Visit     HYDROXYprogesterone caproate (SOCO) intramuscular injection 250 mg     Admin Date  11/29/2021 Action  Given Dose  250 mg Route  Intramuscular Site  Left Ventrogluteal Administered By  Crystal De Guzman RN    Ordering Provider: Tana Ireland MD    NDC: 7260-2779-48    Lot#: [id number]    : [employer]    Patient Supplied?: No    Comments: Right Ventrogluteal area irritated, given L side                  Injectable Medication Documentation    Patient was given Hydroxyprogesterone . Prior to medication administration, verified patients identity using patient s name and date of birth. Please see MAR and medication order for additional information. Patient instructed to remain in clinic for 15 minutes and report any adverse reaction to staff immediately .      Was entire vial of medication used? Yes  Vial/Syringe: Single dose vial  Expiration Date:  11/22  Was this medication supplied by the patient? No   Crystal De Guzman RN on 11/29/2021 at 3:48 PM

## 2021-12-06 ENCOUNTER — ALLIED HEALTH/NURSE VISIT (OUTPATIENT)
Dept: NURSING | Facility: CLINIC | Age: 36
End: 2021-12-06
Payer: COMMERCIAL

## 2021-12-06 ENCOUNTER — PRENATAL OFFICE VISIT (OUTPATIENT)
Dept: OBGYN | Facility: CLINIC | Age: 36
End: 2021-12-06
Payer: COMMERCIAL

## 2021-12-06 VITALS
DIASTOLIC BLOOD PRESSURE: 84 MMHG | BODY MASS INDEX: 27.22 KG/M2 | HEART RATE: 89 BPM | SYSTOLIC BLOOD PRESSURE: 128 MMHG | WEIGHT: 138.2 LBS

## 2021-12-06 DIAGNOSIS — Z87.51 HISTORY OF PRETERM DELIVERY: Primary | ICD-10-CM

## 2021-12-06 DIAGNOSIS — Q51.3 BICORNUATE UTERUS AFFECTING PREGNANCY IN THIRD TRIMESTER, ANTEPARTUM: ICD-10-CM

## 2021-12-06 DIAGNOSIS — O09.213 HIGH RISK PREGNANCY DUE TO HISTORY OF PRETERM LABOR IN THIRD TRIMESTER: ICD-10-CM

## 2021-12-06 DIAGNOSIS — O34.03 BICORNUATE UTERUS AFFECTING PREGNANCY IN THIRD TRIMESTER, ANTEPARTUM: ICD-10-CM

## 2021-12-06 DIAGNOSIS — O09.523 SUPERVISION OF HIGH RISK ELDERLY MULTIGRAVIDA IN THIRD TRIMESTER: Primary | ICD-10-CM

## 2021-12-06 PROCEDURE — 96372 THER/PROPH/DIAG INJ SC/IM: CPT | Performed by: OBSTETRICS & GYNECOLOGY

## 2021-12-06 PROCEDURE — 99207 PR NO CHARGE NURSE ONLY: CPT

## 2021-12-06 PROCEDURE — 99207 PR PRENATAL VISIT: CPT | Performed by: OBSTETRICS & GYNECOLOGY

## 2021-12-06 RX ADMIN — HYDROXYPROGESTERONE CAPROATE 250 MG: 250 INJECTION INTRAMUSCULAR at 13:41

## 2021-12-06 NOTE — Clinical Note
I think you did chely's vitals yesterday? They didn't get into her flowsheet. Could you enter them for me?  [place]  AJ

## 2021-12-06 NOTE — PROGRESS NOTES
Clinic Administered Medication Documentation    Administrations This Visit     HYDROXYprogesterone caproate (SOCO) intramuscular injection 250 mg     Admin Date  12/06/2021 Action  Given Dose  250 mg Route  Intramuscular Site  Right Ventrogluteal Administered By  Crystal De Guzman RN    Ordering Provider: Tana Ireland MD    NDC: 2442-2122-93    Lot#: [id number]    : [employer]    Patient Supplied?: No                  Injectable Medication Documentation    Patient was given Hydroxyprogesterone Caproate. Prior to medication administration, verified patients identity using patient s name and date of birth. Please see MAR and medication order for additional information. Patient instructed to report any adverse reaction to staff immediately .      Was entire vial of medication used? Yes  Vial/Syringe: Single dose vial  Expiration Date:  11/22  Was this medication supplied by the patient? No   Crystal De Guzman RN on 12/6/2021 at 1:43 PM

## 2021-12-07 ENCOUNTER — TELEPHONE (OUTPATIENT)
Dept: OBGYN | Facility: CLINIC | Age: 36
End: 2021-12-07
Payer: COMMERCIAL

## 2021-12-07 ENCOUNTER — PREP FOR PROCEDURE (OUTPATIENT)
Dept: OBGYN | Facility: CLINIC | Age: 36
End: 2021-12-07
Payer: COMMERCIAL

## 2021-12-07 VITALS — SYSTOLIC BLOOD PRESSURE: 128 MMHG | DIASTOLIC BLOOD PRESSURE: 84 MMHG | WEIGHT: 138 LBS | BODY MASS INDEX: 27.18 KG/M2

## 2021-12-07 DIAGNOSIS — O09.523 SUPERVISION OF HIGH RISK ELDERLY MULTIGRAVIDA IN THIRD TRIMESTER: Primary | ICD-10-CM

## 2021-12-07 NOTE — PROGRESS NOTES
Patient has now not been working since she was here last and feels like her BH and overall ctx and comfort are dramatically improved with being able to rest, void as needed, hydrate, etc  Encouraged to still do some general moving around the house to avoid constipation and deconditioning/muscle atrophy, etc and she is doing that  Good FM  Last week  was in bed most of the day because of ctx. Were fairly frequent but not in a regular pattern and pain was not increasing so just rested and have improved since  Doing 50mg hydroxyzine every night the last few nights after doing every other for a few. Really helps to fall asleep and can get back to sleep if wakes in the night easier. Sleep not as great in early AM hours. Reassured ok to take 100mg if needed, or can do another 25-50 mid middle of the night if wakes up, though will be groggy the next day, but now that not working and Jaya is managing Harsha this is ok  ariel done today and continue weekly  Return in 2 weeks and repeat growth scan at that time  Will not do cervix check unless regular and painful ctx and if ends up in MAC for such would then tocolyze and BMZ but wouldn't do it preemptively at this point  Will schedule a c/s for 39 weeks as can't schedule before that anyway. If labor <34 weeks then could consider attempting . If >34 weeks will have to eval options of attempting  or proceeding with c/s due to CPD last time at only 31 weeks

## 2021-12-07 NOTE — TELEPHONE ENCOUNTER
Type of surgery: PRIMARY CS  Location of surgery: [facility] OR  Date and time of surgery: 2/8/2022 4p  Surgeon: KARIME huber/ELLIE  Pre-Op Appt Date: HOSPITAL  Post-Op Appt Date: 3/21/2022 9a   Packet sent out: MAILED 12/7/2021  Pre-cert/Authorization completed:  TBD  Date: 12/7/2021 Marychuy w/Lilly    [facility]OVID [facility]  [facility] Scheduler          ERAS BAG GIVEN No  ERAS INSTRUCTIONS EXPLAINED No    ASSIST: ellie if available o/w could do alone    H&P TO BE COMPLETED BY:   Surgeon  FOR H&P TO BE DONE BY SURGEON   UPDATE VISIT ON DATE AT HOSPITAL  SAME DAY/OBSERVATION/INPATIENT: ADMIT  EQUIPMENT: routine  VENDOR NEEDED AT CASE: na  IF IUD WHAT BRAND na  LABS/SPECIAL INSTRUCTIONS: routine  TIME OFF WORK: 8 weeks  ESTIMATED DOCTOR TIME NEEDED IN MINUTES 90 min  POST OP TO BE SCHEDULED AT 6 WEEKS AFTER SX APPOINTMENT LENGTH IN MINUTES 30

## 2021-12-13 ENCOUNTER — ALLIED HEALTH/NURSE VISIT (OUTPATIENT)
Dept: NURSING | Facility: CLINIC | Age: 36
End: 2021-12-13
Payer: COMMERCIAL

## 2021-12-13 DIAGNOSIS — Z87.51 HISTORY OF PRETERM DELIVERY: Primary | ICD-10-CM

## 2021-12-13 PROCEDURE — 99207 PR NO CHARGE NURSE ONLY: CPT

## 2021-12-13 PROCEDURE — 96372 THER/PROPH/DIAG INJ SC/IM: CPT | Performed by: OBSTETRICS & GYNECOLOGY

## 2021-12-13 RX ADMIN — HYDROXYPROGESTERONE CAPROATE 250 MG: 250 INJECTION INTRAMUSCULAR at 15:41

## 2021-12-13 NOTE — PROGRESS NOTES
Clinic Administered Medication Documentation    Administrations This Visit     HYDROXYprogesterone caproate (SOCO) intramuscular injection 250 mg     Admin Date  12/13/2021 Action  Given Dose  250 mg Route  Intramuscular Site  Left Ventrogluteal Administered By  Tabitha Campbell RN    Ordering Provider: Tana Ireland MD    NDC: 0424-6826-49    Lot#: [id number]    : [employer]    Patient Supplied?: No                  Injectable Medication Documentation    Patient was given Hydroxyprogesterone Caproate([place]). Prior to medication administration, verified patients identity using patient s name and date of birth. Please see MAR and medication order for additional information. Patient instructed to report any adverse reaction to staff immediately .      Was entire vial of medication used? Yes  Vial/Syringe: Single dose vial  Expiration Date:  11/2022  Was this medication supplied by the patient? No     Tabitha Campbell RN

## 2021-12-20 ENCOUNTER — ANCILLARY PROCEDURE (OUTPATIENT)
Dept: ULTRASOUND IMAGING | Facility: CLINIC | Age: 36
End: 2021-12-20
Payer: COMMERCIAL

## 2021-12-20 ENCOUNTER — PATIENT OUTREACH (OUTPATIENT)
Dept: OBGYN | Facility: CLINIC | Age: 36
End: 2021-12-20

## 2021-12-20 ENCOUNTER — ALLIED HEALTH/NURSE VISIT (OUTPATIENT)
Dept: NURSING | Facility: CLINIC | Age: 36
End: 2021-12-20
Payer: COMMERCIAL

## 2021-12-20 ENCOUNTER — PRENATAL OFFICE VISIT (OUTPATIENT)
Dept: OBGYN | Facility: CLINIC | Age: 36
End: 2021-12-20
Payer: COMMERCIAL

## 2021-12-20 VITALS — SYSTOLIC BLOOD PRESSURE: 122 MMHG | WEIGHT: 138.8 LBS | BODY MASS INDEX: 27.33 KG/M2 | DIASTOLIC BLOOD PRESSURE: 74 MMHG

## 2021-12-20 DIAGNOSIS — O09.523 SUPERVISION OF HIGH RISK ELDERLY MULTIGRAVIDA IN THIRD TRIMESTER: Primary | ICD-10-CM

## 2021-12-20 DIAGNOSIS — O34.03 BICORNUATE UTERUS AFFECTING PREGNANCY IN THIRD TRIMESTER, ANTEPARTUM: ICD-10-CM

## 2021-12-20 DIAGNOSIS — O09.93 SUPERVISION OF HIGH RISK PREGNANCY IN THIRD TRIMESTER: Primary | ICD-10-CM

## 2021-12-20 DIAGNOSIS — O09.213 HIGH RISK PREGNANCY DUE TO HISTORY OF PRETERM LABOR IN THIRD TRIMESTER: ICD-10-CM

## 2021-12-20 DIAGNOSIS — Q51.3 BICORNUATE UTERUS AFFECTING PREGNANCY IN THIRD TRIMESTER, ANTEPARTUM: ICD-10-CM

## 2021-12-20 PROCEDURE — 99207 PR PRENATAL VISIT: CPT | Performed by: OBSTETRICS & GYNECOLOGY

## 2021-12-20 PROCEDURE — 96372 THER/PROPH/DIAG INJ SC/IM: CPT | Performed by: OBSTETRICS & GYNECOLOGY

## 2021-12-20 PROCEDURE — 59426 ANTEPARTUM CARE ONLY: CPT | Performed by: OBSTETRICS & GYNECOLOGY

## 2021-12-20 PROCEDURE — 99207 PR NO CHARGE NURSE ONLY: CPT

## 2021-12-20 PROCEDURE — 76816 OB US FOLLOW-UP PER FETUS: CPT | Performed by: OBSTETRICS & GYNECOLOGY

## 2021-12-20 RX ADMIN — HYDROXYPROGESTERONE CAPROATE 250 MG: 250 INJECTION INTRAMUSCULAR at 13:18

## 2021-12-20 NOTE — PROGRESS NOTES
.  Clinic Administered Medication Documentation    Administrations This Visit     HYDROXYprogesterone caproate (SOCO) intramuscular injection 250 mg     Admin Date  12/20/2021 Action  Given Dose  250 mg Route  Intramuscular Site  Right Ventrogluteal Administered By  Meera Nava RN    Ordering Provider: Tana Ireland MD    NDC: 9635-0463-42    Lot#: [id number]    : [employer]    Patient Supplied?: No                  Injectable Medication Documentation    Patient was given Hydroxprogerone Caproate. Prior to medication administration, verified patients identity using patient s name and date of birth. Please see MAR and medication order for additional information. Patient instructed to report any adverse reaction to staff immediately .      Was entire vial of medication used? Yes  Vial/Syringe: Single dose vial  Expiration Date:  11/2022  Was this medication supplied by the patient? NO    Pt verbalized understanding, in agreement with plan, and voiced no further questions.    John Nava RN on 12/20/2021 at 1:23 PM

## 2021-12-20 NOTE — TELEPHONE ENCOUNTER
Hi Dr Ireland,    37 yo pt is due for cotesting 1 year post normal colposcopy. She is currently 32w0d and is scheduled for a  on 22. Would you like to do her cotest at her next visit or push this to the postpartum visit? Thanks!     NIL pap  19 NIL pap, + HPV 16 & other. Plan: colpo  19 Colpo: Bx-ANH 1.  Plan: cotest in 1 year  10/7/20 NIL pap, + HR HPV 16 & other HR type. Plan colp bef 21 [place] - [facility], negative. Plan 1 yr co-test.    Greta Carmona RN BSN, Pap Tracking

## 2021-12-20 NOTE — PROGRESS NOTES
BPD 8.32 cm 33w3d 82.4%   HC 29.31 cm 32w2d 21.7%   AC 27.01 cm 31w1d 23%   FL 6.04 cm 31w3d 21.9%   EFW (lbs/oz) 3 lbs               15ozs       EFW (g) 1781 g 25.4%        Fetal heart rate: 161 bpm  Fetal presentation: Cephalic  Amniotic fluid: 5.34cm MVP    Growth is spot on with harsha who was born one week earlier than current preg GA and he was 3-5# and this is an EFW of 3-15# or 25% with AC of 23%. Normal MVP  Good FM and just pushing much more into her RUQ and ribs/liver. Was never this far along with her first so assumes just normal as progresses. Has fewer BH and cramps now than she did which is good. Doing some household things but then resting. Harsha going to her mom's during the day so she can rest more  Getting her ariel every Friday.  No cervix check unless indicated or will stir up BH and worry her. Discussed tocolysis with mag and BMZ and when/how that is determined  Other than ariel shot, will return in 2 weeks unless has any regular or timeable and painful ctx at any point after tryng typical interventions at home like rest, hydration, etc

## 2021-12-21 NOTE — TELEPHONE ENCOUNTER
"Copied note from provider:    \"  Tana Ireland MD  You 14 hours ago (5:47 PM)     AJ    Postpartum     AJ    Message text    \"  "

## 2021-12-27 ENCOUNTER — ALLIED HEALTH/NURSE VISIT (OUTPATIENT)
Dept: NURSING | Facility: CLINIC | Age: 36
End: 2021-12-27
Payer: COMMERCIAL

## 2021-12-27 DIAGNOSIS — O09.93 SUPERVISION OF HIGH RISK PREGNANCY IN THIRD TRIMESTER: Primary | ICD-10-CM

## 2021-12-27 PROCEDURE — 99207 PR NO CHARGE NURSE ONLY: CPT

## 2021-12-27 PROCEDURE — 96372 THER/PROPH/DIAG INJ SC/IM: CPT | Performed by: OBSTETRICS & GYNECOLOGY

## 2021-12-27 RX ADMIN — HYDROXYPROGESTERONE CAPROATE 250 MG: 250 INJECTION INTRAMUSCULAR at 14:39

## 2021-12-27 NOTE — PROGRESS NOTES
Clinic Administered Medication Documentation    Administrations This Visit     HYDROXYprogesterone caproate (SOCO) intramuscular injection 250 mg     Admin Date  12/27/2021 Action  Given Dose  250 mg Route  Intramuscular Site  Left Ventrogluteal Administered By  Tabitha Campbell RN    Ordering Provider: Tana Ireland MD    NDC: 4916-3155-41    Lot#: [id number]    : [employer]    Patient Supplied?: No                  Injectable Medication Documentation    Patient was given [place]. Prior to medication administration, verified patients identity using patient s name and date of birth. Please see MAR and medication order for additional information. Patient instructed to report any adverse reaction to staff immediately .      Was entire vial of medication used? Yes  Vial/Syringe: Single dose vial  Expiration Date:  11/2022  Was this medication supplied by the patient? No     Tabitha Campbell RN

## 2022-01-03 ENCOUNTER — ALLIED HEALTH/NURSE VISIT (OUTPATIENT)
Dept: NURSING | Facility: CLINIC | Age: 37
End: 2022-01-03
Payer: COMMERCIAL

## 2022-01-03 ENCOUNTER — PRENATAL OFFICE VISIT (OUTPATIENT)
Dept: OBGYN | Facility: CLINIC | Age: 37
End: 2022-01-03
Payer: COMMERCIAL

## 2022-01-03 VITALS — BODY MASS INDEX: 27.89 KG/M2 | SYSTOLIC BLOOD PRESSURE: 112 MMHG | WEIGHT: 141.6 LBS | DIASTOLIC BLOOD PRESSURE: 70 MMHG

## 2022-01-03 DIAGNOSIS — O09.523 SUPERVISION OF ELDERLY MULTIGRAVIDA IN THIRD TRIMESTER: Primary | ICD-10-CM

## 2022-01-03 DIAGNOSIS — O09.93 SUPERVISION OF HIGH RISK PREGNANCY IN THIRD TRIMESTER: Primary | ICD-10-CM

## 2022-01-03 DIAGNOSIS — O09.213 HIGH RISK PREGNANCY DUE TO HISTORY OF PRETERM LABOR IN THIRD TRIMESTER: ICD-10-CM

## 2022-01-03 DIAGNOSIS — O34.03 BICORNUATE UTERUS AFFECTING PREGNANCY IN THIRD TRIMESTER, ANTEPARTUM: ICD-10-CM

## 2022-01-03 DIAGNOSIS — Q51.3 BICORNUATE UTERUS AFFECTING PREGNANCY IN THIRD TRIMESTER, ANTEPARTUM: ICD-10-CM

## 2022-01-03 PROCEDURE — 99207 PR PRENATAL VISIT: CPT | Performed by: OBSTETRICS & GYNECOLOGY

## 2022-01-03 PROCEDURE — 96372 THER/PROPH/DIAG INJ SC/IM: CPT | Performed by: OBSTETRICS & GYNECOLOGY

## 2022-01-03 PROCEDURE — 99207 PR NO CHARGE NURSE ONLY: CPT

## 2022-01-03 RX ADMIN — HYDROXYPROGESTERONE CAPROATE 250 MG: 250 INJECTION INTRAMUSCULAR at 13:09

## 2022-01-03 NOTE — PROGRESS NOTES
Clinic Administered Medication Documentation    Administrations This Visit     HYDROXYprogesterone caproate (SOCO) intramuscular injection 250 mg     Admin Date  01/03/2022 Action  Given Dose  250 mg Route  Intramuscular Site  Right Ventrogluteal Administered By  Tabitha Campbell RN    Ordering Provider: Tana Ireland MD    NDC: 0244-2720-78    Lot#: [id number]    : [employer]    Patient Supplied?: No                  Injectable Medication Documentation    Patient was given Soco. Prior to medication administration, verified patients identity using patient s name and date of birth. Please see MAR and medication order for additional information. Patient instructed to report any adverse reaction to staff immediately .      Was entire vial of medication used? Yes  Vial/Syringe: Single dose vial  Expiration Date:  2/2023  Was this medication supplied by the patient? No       Tabitha Campbell RN

## 2022-01-04 NOTE — PROGRESS NOTES
Patient is frankly shocked that she's made it this far and can't even believe it  Having some painless tightening and just frankly running out of space and baby is pushing into her right rib cage and liver and also into her pelvis. Urinating constantly and urgency and then goes and barely antyhing there. Reassured this is all normal for her GA and just never made it this far before and she is very petite  Now as getting closer to a possible c/s at 39 weeks discussed it in detail, ERAS bag given. Will still attempt to see if can move it up as currently scheduled for 1600.  ariel today and then has one more next week and at 36 weeks and that would be the last one  No HAs, no vision changes, minimal swelling, BPs normal and weight is appropriate gain  Feels cephalic on leopold's  Plan is to return in 2 weeks with growth scan and GBS/Cervix check. At that point will try to assess the level of pelvic space she has. Discussed FTD and dystocia and how each is it's own type of dx and risk and timing of dx and FTD is just an unscheduled c/s whereas dystocia is an emergency and what I worry about more with CPD in her when only 3-5# had such a tight fit through the bones and not just the degree of tearing.

## 2022-01-10 ENCOUNTER — ALLIED HEALTH/NURSE VISIT (OUTPATIENT)
Dept: NURSING | Facility: CLINIC | Age: 37
End: 2022-01-10
Payer: COMMERCIAL

## 2022-01-10 DIAGNOSIS — Z87.51 HISTORY OF PRETERM DELIVERY: ICD-10-CM

## 2022-01-10 DIAGNOSIS — O09.93 SUPERVISION OF HIGH RISK PREGNANCY IN THIRD TRIMESTER: Primary | ICD-10-CM

## 2022-01-10 PROCEDURE — 99207 PR NO CHARGE NURSE ONLY: CPT

## 2022-01-10 PROCEDURE — 96372 THER/PROPH/DIAG INJ SC/IM: CPT | Performed by: OBSTETRICS & GYNECOLOGY

## 2022-01-10 RX ADMIN — HYDROXYPROGESTERONE CAPROATE 250 MG: 250 INJECTION INTRAMUSCULAR at 14:35

## 2022-01-10 NOTE — NURSING NOTE
Clinic Administered Medication Documentation    Administrations This Visit     HYDROXYprogesterone caproate (SOCO) intramuscular injection 250 mg     Admin Date  01/10/2022 Action  Given Dose  250 mg Route  Intramuscular Site  Left Ventrogluteal Administered By  Jenna Ashley RN    Ordering Provider: Tana Ireland MD    NDC: 8284-3608-46    Lot#: [id number]    : [employer]    Patient Supplied?: No                  Injectable Medication Documentation    Patient was given Hydroxyprogesterone Caproate. Prior to medication administration, verified patients identity using patient s name and date of birth. Please see MAR and medication order for additional information. Patient instructed to report any adverse reaction to staff immediately .      Was entire vial of medication used? Yes  Vial/Syringe: Single dose vial  Expiration Date:  11/2022  Was this medication supplied by the patient? No     Patient tolerated injection without incident and discharged home.  Jenna Ashley RN on 1/10/2022 at 2:37 PM

## 2022-01-11 NOTE — TELEPHONE ENCOUNTER
DUE TO 4pm TIME on 2/8 this patient has been moved as follows per Dr Ireland request  Marychuy w/Areli at [facility] for changes    CS 2/9/2022 7:15a    LVM for pt with details and requested CB to confirm    Shawanda Lopez  Surgery Scheduler

## 2022-01-17 ENCOUNTER — ALLIED HEALTH/NURSE VISIT (OUTPATIENT)
Dept: NURSING | Facility: CLINIC | Age: 37
End: 2022-01-17
Payer: COMMERCIAL

## 2022-01-17 ENCOUNTER — ANCILLARY PROCEDURE (OUTPATIENT)
Dept: ULTRASOUND IMAGING | Facility: CLINIC | Age: 37
End: 2022-01-17
Payer: COMMERCIAL

## 2022-01-17 ENCOUNTER — PRENATAL OFFICE VISIT (OUTPATIENT)
Dept: OBGYN | Facility: CLINIC | Age: 37
End: 2022-01-17
Payer: COMMERCIAL

## 2022-01-17 VITALS — SYSTOLIC BLOOD PRESSURE: 120 MMHG | DIASTOLIC BLOOD PRESSURE: 84 MMHG | WEIGHT: 143 LBS | BODY MASS INDEX: 28.16 KG/M2

## 2022-01-17 DIAGNOSIS — O09.523 SUPERVISION OF ELDERLY MULTIGRAVIDA IN THIRD TRIMESTER: Primary | ICD-10-CM

## 2022-01-17 DIAGNOSIS — Z36.85 SCREENING, ANTENATAL, FOR STREPTOCOCCUS B: ICD-10-CM

## 2022-01-17 DIAGNOSIS — O09.213 HIGH RISK PREGNANCY DUE TO HISTORY OF PRETERM LABOR IN THIRD TRIMESTER: ICD-10-CM

## 2022-01-17 DIAGNOSIS — Q51.3 BICORNUATE UTERUS AFFECTING PREGNANCY IN THIRD TRIMESTER, ANTEPARTUM: ICD-10-CM

## 2022-01-17 DIAGNOSIS — Z87.51 HISTORY OF PRETERM DELIVERY: Primary | ICD-10-CM

## 2022-01-17 DIAGNOSIS — O34.03 BICORNUATE UTERUS AFFECTING PREGNANCY IN THIRD TRIMESTER, ANTEPARTUM: ICD-10-CM

## 2022-01-17 PROCEDURE — 99207 PR PRENATAL VISIT: CPT | Performed by: OBSTETRICS & GYNECOLOGY

## 2022-01-17 PROCEDURE — 76816 OB US FOLLOW-UP PER FETUS: CPT | Performed by: OBSTETRICS & GYNECOLOGY

## 2022-01-17 PROCEDURE — 87653 STREP B DNA AMP PROBE: CPT | Performed by: OBSTETRICS & GYNECOLOGY

## 2022-01-17 PROCEDURE — 99207 PR NO CHARGE NURSE ONLY: CPT

## 2022-01-17 PROCEDURE — 96372 THER/PROPH/DIAG INJ SC/IM: CPT | Performed by: OBSTETRICS & GYNECOLOGY

## 2022-01-17 RX ADMIN — HYDROXYPROGESTERONE CAPROATE 250 MG: 250 INJECTION INTRAMUSCULAR at 10:00

## 2022-01-17 NOTE — NURSING NOTE
Clinic Administered Medication Documentation    Administrations This Visit     HYDROXYprogesterone caproate (SOCO) intramuscular injection 250 mg     Admin Date  01/17/2022 Action  Given Dose  250 mg Route  Intramuscular Site  Right Ventrogluteal Administered By  Jenna Ashley RN    Ordering Provider: Tana Ireland MD    NDC: 0935-1158-67    Lot#: [id number]    : [employer]    Patient Supplied?: No                  Injectable Medication Documentation    Patient was given Hydroxyprogesterone Caproate. Prior to medication administration, verified patients identity using patient s name and date of birth. Please see MAR and medication order for additional information. Patient instructed to report any adverse reaction to staff immediately .      Was entire vial of medication used? Yes  Vial/Syringe: Single dose vial  Expiration Date:  11/2022  Was this medication supplied by the patient? No     Patient tolerated injection without incident and discharged home.  Jenna Ashley RN on 1/17/2022 at 10:01 AM

## 2022-01-17 NOTE — PROGRESS NOTES
BPD 8.93 cm 36w1d 62.6%   HC 31.80 cm 35w5d 16.1%   AC 31.33 cm 35w2d 38.4%   FL 6.79 cm 34w6d 18.8%   EFW (lbs/oz) 5 lbs               13ozs       EFW (g) 2645 g 32.7%        Fetal heart rate: 153 bpm  Fetal presentation: Cephalic  Amniotic fluid: 4.02cm MVP    Growth scan is very normal overall EFW of 5-13# or 33% and likely is closer to 5-5.5#. the AC is proportional to that. However tanisha was 3-5# and was a very tight fight despite small size  Have discussed over the course of pregnancy that if she is able to make it to full term that a baby much larger than her first may be problematic but no way to predict that. Plan today was GBS and a cervix check and to assess pelvic dimensions to see if could help determine if attempt at another  was a safe option, vs planned c/s as we have right now on  which is 39+2 at 715 as  only 1600 available  Last ariel shot today  Tons of BH when gets up but when lays back down and rests they are better  No VB or LOF. Lots of pressure vaginally and in pubic bone  Discussed ERAS fully and had given her the bag last time  GBS done and patient hardly tolerated just swab inside the introitus.  One finger check was tolerated and head was quite low at -1 to 0 but cervix is quite posterior so attempted a 2 finger check and with 2 fingers vertically there was almost no way to even pass them  Though has adequate pelvic inlet space as per one finger check it is the outlet that has always concerned me more for arrest of descent but mostly for dystocia  After today's exam we all feel that proceeding with planned c/s if should go to 39+2 but even if should had PROM/labor prior to that is the best option  Will make an appointment with me on  and will do T&S and CBC and covid test that day in the office in prep for  c/s  If labor before then or PROM will do those things on arrival to hospital  Can cancel her next week's ariel as today is her last indicated one  Return 1 week  and discussed si/sx of labor/PROM to come in for

## 2022-01-19 LAB
GP B STREP DNA SPEC QL NAA+PROBE: NEGATIVE
PATIENT PENICILLIN, AMOXICILLIN, CEPHALOSPORINS ALLERGY: NO

## 2022-01-20 ENCOUNTER — TELEPHONE (OUTPATIENT)
Dept: OBGYN | Facility: CLINIC | Age: 37
End: 2022-01-20
Payer: COMMERCIAL

## 2022-01-20 NOTE — TELEPHONE ENCOUNTER
Pt called stating there was a mistake on the scheduling of her feb. Appointment before her , that she was accidentally scheduled for the appt on  in the afternoon but her surgery is actually that morning. Shu doesn't have anything open on the  which was the date she thought she was scheduled for. Pt is wondering if she should be worked in, see someone else, or if not necessary- should she just come in and do the covid test on  as she had planned. Please advise.

## 2022-01-20 NOTE — TELEPHONE ENCOUNTER
Returning patient call, explained that patient does need to be seen on 2/7/22 for pre-op for c/s, covid testing, lab work, and information given on scheduled c/s on 2/9/22.  Patient really wanting to see Dr Ireland, but her schedule is full. Patient wants to know if there is any way that she could be worked in on 2/7/22?  Encouraged patient to schedule with another provider at this time so that she can have a spot in the schedule and we will go from there. Wondering if Dr JAZZY Birmingham can see her? Transferred patient to scheduling.    Pt verbalized understanding, in agreement with plan, and voiced no further questions.  John Nava RN on 1/20/2022 at 8:29 AM

## 2022-01-22 NOTE — TELEPHONE ENCOUNTER
So for an appointment specifically 2 days before having a c/s, she should be seen by her own provider which is why her and I made those plans.    My 9 am patient that day who is supposed to be having a postop check, doesn't actually need it and we were supposed to let her know that. Please have scheduling call that patient and see if she really intends to come in and if not, chely can have her 9am spot. If the other patient wanted to be seen just to discuss then chely can be seen at 9 and the postop can either do in person 915 or virtual 915

## 2022-01-24 NOTE — TELEPHONE ENCOUNTER
This information was given to scheduling and they are taking care of it and will contact patient.    Jenna Ashley RN on 1/24/2022 at 9:03 AM

## 2022-01-26 ENCOUNTER — PRENATAL OFFICE VISIT (OUTPATIENT)
Dept: OBGYN | Facility: CLINIC | Age: 37
End: 2022-01-26
Payer: COMMERCIAL

## 2022-01-26 VITALS — SYSTOLIC BLOOD PRESSURE: 118 MMHG | DIASTOLIC BLOOD PRESSURE: 80 MMHG | BODY MASS INDEX: 27.97 KG/M2 | WEIGHT: 142 LBS

## 2022-01-26 DIAGNOSIS — Q51.3 BICORNUATE UTERUS AFFECTING PREGNANCY IN THIRD TRIMESTER, ANTEPARTUM: ICD-10-CM

## 2022-01-26 DIAGNOSIS — O34.03 BICORNUATE UTERUS AFFECTING PREGNANCY IN THIRD TRIMESTER, ANTEPARTUM: ICD-10-CM

## 2022-01-26 DIAGNOSIS — O09.523 HIGH-RISK PREGNANCY, ELDERLY MULTIGRAVIDA IN THIRD TRIMESTER: Primary | ICD-10-CM

## 2022-01-26 DIAGNOSIS — O09.213 HIGH RISK PREGNANCY DUE TO HISTORY OF PRETERM LABOR IN THIRD TRIMESTER: ICD-10-CM

## 2022-01-26 PROCEDURE — 99207 PR PRENATAL VISIT: CPT | Performed by: OBSTETRICS & GYNECOLOGY

## 2022-01-28 DIAGNOSIS — Z11.59 ENCOUNTER FOR SCREENING FOR OTHER VIRAL DISEASES: Primary | ICD-10-CM

## 2022-01-29 RX ORDER — METHYLERGONOVINE MALEATE 0.2 MG/ML
200 INJECTION INTRAVENOUS
Status: CANCELLED | OUTPATIENT
Start: 2022-01-29

## 2022-01-29 RX ORDER — ACETAMINOPHEN 325 MG/1
975 TABLET ORAL ONCE
Status: CANCELLED | OUTPATIENT
Start: 2022-01-29 | End: 2022-01-29

## 2022-01-29 RX ORDER — CITRIC ACID/SODIUM CITRATE 334-500MG
30 SOLUTION, ORAL ORAL
Status: CANCELLED | OUTPATIENT
Start: 2022-01-29

## 2022-01-29 RX ORDER — SODIUM CHLORIDE, SODIUM LACTATE, POTASSIUM CHLORIDE, CALCIUM CHLORIDE 600; 310; 30; 20 MG/100ML; MG/100ML; MG/100ML; MG/100ML
INJECTION, SOLUTION INTRAVENOUS CONTINUOUS
Status: CANCELLED | OUTPATIENT
Start: 2022-01-29

## 2022-01-29 RX ORDER — LIDOCAINE 40 MG/G
CREAM TOPICAL
Status: CANCELLED | OUTPATIENT
Start: 2022-01-29

## 2022-01-29 RX ORDER — OXYTOCIN/0.9 % SODIUM CHLORIDE 30/500 ML
100-340 PLASTIC BAG, INJECTION (ML) INTRAVENOUS CONTINUOUS PRN
Status: CANCELLED | OUTPATIENT
Start: 2022-01-29

## 2022-01-29 RX ORDER — OXYTOCIN/0.9 % SODIUM CHLORIDE 30/500 ML
340 PLASTIC BAG, INJECTION (ML) INTRAVENOUS CONTINUOUS PRN
Status: CANCELLED | OUTPATIENT
Start: 2022-01-29

## 2022-01-29 RX ORDER — MISOPROSTOL 200 UG/1
800 TABLET ORAL
Status: CANCELLED | OUTPATIENT
Start: 2022-01-29

## 2022-01-29 RX ORDER — CARBOPROST TROMETHAMINE 250 UG/ML
250 INJECTION, SOLUTION INTRAMUSCULAR
Status: CANCELLED | OUTPATIENT
Start: 2022-01-29

## 2022-01-29 RX ORDER — MISOPROSTOL 100 UG/1
400 TABLET ORAL
Status: CANCELLED | OUTPATIENT
Start: 2022-01-29

## 2022-01-29 RX ORDER — OXYTOCIN 10 [USP'U]/ML
10 INJECTION, SOLUTION INTRAMUSCULAR; INTRAVENOUS
Status: CANCELLED | OUTPATIENT
Start: 2022-01-29

## 2022-01-29 NOTE — PROGRESS NOTES
Got her last ariel last week so done now  Can't believe she made it to full term and is definitely much more uncomfortable and tight than she ever was first time  Still getting BH whenever she is up a lot and standing/walking and end of day but always resolves right away when lays down  Hasn't had any VB or LOF or change in vaginal discharge  Baby is still moving quite a bit and no concerns there  No swelling, no pre-e sx, normal BP  FH is all on her right with bicornuate uterus so FH is not very accurate but is small. However growth scan just over a week ago was normal and c/w patient's own small size  She is now feeling much more comfortable about our c/s plan so declines cervix check today  If should have regular ctx and true labor, or SROM would come in and do c/s then, o/w scheduled in 2 weeks and seeing me 2 days before so as to do covid testing  Return 1 wk

## 2022-01-31 ENCOUNTER — PRENATAL OFFICE VISIT (OUTPATIENT)
Dept: OBGYN | Facility: CLINIC | Age: 37
End: 2022-01-31
Payer: COMMERCIAL

## 2022-01-31 VITALS — DIASTOLIC BLOOD PRESSURE: 74 MMHG | SYSTOLIC BLOOD PRESSURE: 122 MMHG | WEIGHT: 142 LBS | BODY MASS INDEX: 27.97 KG/M2

## 2022-01-31 DIAGNOSIS — Q51.3 BICORNUATE UTERUS AFFECTING PREGNANCY IN THIRD TRIMESTER, ANTEPARTUM: ICD-10-CM

## 2022-01-31 DIAGNOSIS — O09.523 HIGH-RISK PREGNANCY, ELDERLY MULTIGRAVIDA IN THIRD TRIMESTER: Primary | ICD-10-CM

## 2022-01-31 DIAGNOSIS — O09.213 HIGH RISK PREGNANCY DUE TO HISTORY OF PRETERM LABOR IN THIRD TRIMESTER: ICD-10-CM

## 2022-01-31 DIAGNOSIS — O34.03 BICORNUATE UTERUS AFFECTING PREGNANCY IN THIRD TRIMESTER, ANTEPARTUM: ICD-10-CM

## 2022-01-31 PROCEDURE — 99207 PR PRENATAL VISIT: CPT | Performed by: OBSTETRICS & GYNECOLOGY

## 2022-02-05 NOTE — PROGRESS NOTES
Still completely shocked that hasn't delivered  Has a lot of BH whenever she walks or sits for too long and especially in the evening but always dies down when sits/lays down  No VB or LOF or change in vaginal discharge  Has nore vaginal pressure this week but also still very tight and high in her upper abdomen with baby all on the right with her bicornuate uterus  Reviewed si/sx of labor or PROM in the next 9 days before her c/s and when to come now is just for true real labor or LOF or bleeding or fetal movement concerns and nothing short of that  Good FM still  Reviewed the process of the c/s again and the recovery, pain meds, covid testing, etc  She will return once more in a week, which is 2 days before her c/s, and do covid testing and labs for preop and then c/s is on 2/9 at 715

## 2022-02-07 ENCOUNTER — PRENATAL OFFICE VISIT (OUTPATIENT)
Dept: OBGYN | Facility: CLINIC | Age: 37
End: 2022-02-07
Payer: COMMERCIAL

## 2022-02-07 ENCOUNTER — TELEPHONE (OUTPATIENT)
Dept: OBGYN | Facility: CLINIC | Age: 37
End: 2022-02-07

## 2022-02-07 VITALS — SYSTOLIC BLOOD PRESSURE: 116 MMHG | BODY MASS INDEX: 27.77 KG/M2 | WEIGHT: 141 LBS | DIASTOLIC BLOOD PRESSURE: 80 MMHG

## 2022-02-07 DIAGNOSIS — Q51.3 BICORNUATE UTERUS AFFECTING PREGNANCY IN THIRD TRIMESTER, ANTEPARTUM: ICD-10-CM

## 2022-02-07 DIAGNOSIS — O34.03 BICORNUATE UTERUS AFFECTING PREGNANCY IN THIRD TRIMESTER, ANTEPARTUM: ICD-10-CM

## 2022-02-07 DIAGNOSIS — O09.523 HIGH-RISK PREGNANCY, ELDERLY MULTIGRAVIDA IN THIRD TRIMESTER: Primary | ICD-10-CM

## 2022-02-07 DIAGNOSIS — O09.213 HIGH RISK PREGNANCY DUE TO HISTORY OF PRETERM LABOR IN THIRD TRIMESTER: ICD-10-CM

## 2022-02-07 DIAGNOSIS — Z11.59 ENCOUNTER FOR SCREENING FOR OTHER VIRAL DISEASES: ICD-10-CM

## 2022-02-07 DIAGNOSIS — Z01.818 PREOP TESTING: ICD-10-CM

## 2022-02-07 LAB
ABO/RH(D): NORMAL
ANTIBODY SCREEN: NEGATIVE
ERYTHROCYTE [DISTWIDTH] IN BLOOD BY AUTOMATED COUNT: 11.9 % (ref 10–15)
HCT VFR BLD AUTO: 45 % (ref 35–47)
HGB BLD-MCNC: 15.5 G/DL (ref 11.7–15.7)
MCH RBC QN AUTO: 30.9 PG (ref 26.5–33)
MCHC RBC AUTO-ENTMCNC: 34.4 G/DL (ref 31.5–36.5)
MCV RBC AUTO: 90 FL (ref 78–100)
PLATELET # BLD AUTO: 184 10E3/UL (ref 150–450)
RBC # BLD AUTO: 5.01 10E6/UL (ref 3.8–5.2)
SARS-COV-2 RNA RESP QL NAA+PROBE: NEGATIVE
SPECIMEN EXPIRATION DATE: NORMAL
WBC # BLD AUTO: 12.1 10E3/UL (ref 4–11)

## 2022-02-07 PROCEDURE — 85027 COMPLETE CBC AUTOMATED: CPT | Performed by: OBSTETRICS & GYNECOLOGY

## 2022-02-07 PROCEDURE — U0005 INFEC AGEN DETEC AMPLI PROBE: HCPCS | Performed by: OBSTETRICS & GYNECOLOGY

## 2022-02-07 PROCEDURE — 86780 TREPONEMA PALLIDUM: CPT | Performed by: OBSTETRICS & GYNECOLOGY

## 2022-02-07 PROCEDURE — 86900 BLOOD TYPING SEROLOGIC ABO: CPT | Performed by: OBSTETRICS & GYNECOLOGY

## 2022-02-07 PROCEDURE — 86850 RBC ANTIBODY SCREEN: CPT | Performed by: OBSTETRICS & GYNECOLOGY

## 2022-02-07 PROCEDURE — 59425 ANTEPARTUM CARE ONLY: CPT | Performed by: OBSTETRICS & GYNECOLOGY

## 2022-02-07 PROCEDURE — 86901 BLOOD TYPING SEROLOGIC RH(D): CPT | Performed by: OBSTETRICS & GYNECOLOGY

## 2022-02-07 PROCEDURE — U0003 INFECTIOUS AGENT DETECTION BY NUCLEIC ACID (DNA OR RNA); SEVERE ACUTE RESPIRATORY SYNDROME CORONAVIRUS 2 (SARS-COV-2) (CORONAVIRUS DISEASE [COVID-19]), AMPLIFIED PROBE TECHNIQUE, MAKING USE OF HIGH THROUGHPUT TECHNOLOGIES AS DESCRIBED BY CMS-2020-01-R: HCPCS | Performed by: OBSTETRICS & GYNECOLOGY

## 2022-02-07 PROCEDURE — 36415 COLL VENOUS BLD VENIPUNCTURE: CPT | Performed by: OBSTETRICS & GYNECOLOGY

## 2022-02-07 NOTE — TELEPHONE ENCOUNTER
Patient calling because she thinks she lost her mucus plug but is scheduled for a c section on 2/9 - wants to discuss.

## 2022-02-07 NOTE — TELEPHONE ENCOUNTER
Type of Paperwork received:  FMLA    Date Rcvd:  2/7/2022    Rcvd From (Company name): [employer]    Provider:  Dr. Ireland    Placed on Provider Cart Date:  2/7/2022

## 2022-02-07 NOTE — TELEPHONE ENCOUNTER
39w0d    Saw Dr Ireland this morning.  Had some contractions this morning prior to apt. Discussed mucous plug even at her visit.  Wanting to pass by nurse as she experienced some extra clear mucous passed when she got home. Thicker, denies being watery.  No blood.  No contractions.  Active FM    Reassured this is normal in a term pregnancy.  Stay hydrated and monitor for other sxs and call:  Cramping/contractions 6 or more an hour, bleeding, LOF or DFM.    Pt verbalized understanding, in agreement with plan, and voiced no further questions.    Informed pt will let Dr Ireland know she called.    Jenna Ashley RN on 2/7/2022 at 2:26 PM

## 2022-02-08 ENCOUNTER — ANESTHESIA EVENT (OUTPATIENT)
Dept: OBGYN | Facility: CLINIC | Age: 37
End: 2022-02-08
Payer: COMMERCIAL

## 2022-02-08 PROBLEM — N87.0 DYSPLASIA OF CERVIX, LOW GRADE (CIN 1): Status: ACTIVE | Noted: 2019-08-22

## 2022-02-08 LAB — T PALLIDUM AB SER QL: NONREACTIVE

## 2022-02-09 ENCOUNTER — HOSPITAL ENCOUNTER (INPATIENT)
Facility: CLINIC | Age: 37
LOS: 3 days | Discharge: HOME | End: 2022-02-12
Attending: OBSTETRICS & GYNECOLOGY | Admitting: OBSTETRICS & GYNECOLOGY
Payer: COMMERCIAL

## 2022-02-09 ENCOUNTER — ANESTHESIA (OUTPATIENT)
Dept: OBGYN | Facility: CLINIC | Age: 37
End: 2022-02-09
Payer: COMMERCIAL

## 2022-02-09 DIAGNOSIS — Z98.891 S/P CESAREAN SECTION: Primary | ICD-10-CM

## 2022-02-09 DIAGNOSIS — Q51.3 BICORNUATE UTERUS AFFECTING PREGNANCY IN THIRD TRIMESTER, ANTEPARTUM: ICD-10-CM

## 2022-02-09 DIAGNOSIS — O09.213 HIGH RISK PREGNANCY DUE TO HISTORY OF PRETERM LABOR IN THIRD TRIMESTER: ICD-10-CM

## 2022-02-09 DIAGNOSIS — O34.03 BICORNUATE UTERUS AFFECTING PREGNANCY IN THIRD TRIMESTER, ANTEPARTUM: ICD-10-CM

## 2022-02-09 DIAGNOSIS — O09.523 HIGH-RISK PREGNANCY, ELDERLY MULTIGRAVIDA IN THIRD TRIMESTER: ICD-10-CM

## 2022-02-09 PROCEDURE — 250N000011 HC RX IP 250 OP 636: Performed by: OBSTETRICS & GYNECOLOGY

## 2022-02-09 PROCEDURE — 258N000003 HC RX IP 258 OP 636: Performed by: NURSE ANESTHETIST, CERTIFIED REGISTERED

## 2022-02-09 PROCEDURE — 250N000013 HC RX MED GY IP 250 OP 250 PS 637

## 2022-02-09 PROCEDURE — 370N000017 HC ANESTHESIA TECHNICAL FEE, PER MIN: Performed by: OBSTETRICS & GYNECOLOGY

## 2022-02-09 PROCEDURE — 250N000013 HC RX MED GY IP 250 OP 250 PS 637: Performed by: OBSTETRICS & GYNECOLOGY

## 2022-02-09 PROCEDURE — 710N000009 HC RECOVERY PHASE 1, LEVEL 1, PER MIN: Performed by: OBSTETRICS & GYNECOLOGY

## 2022-02-09 PROCEDURE — 250N000009 HC RX 250: Performed by: OBSTETRICS & GYNECOLOGY

## 2022-02-09 PROCEDURE — 360N000076 HC SURGERY LEVEL 3, PER MIN: Performed by: OBSTETRICS & GYNECOLOGY

## 2022-02-09 PROCEDURE — 59515 CESAREAN DELIVERY: CPT | Performed by: OBSTETRICS & GYNECOLOGY

## 2022-02-09 PROCEDURE — 250N000009 HC RX 250

## 2022-02-09 PROCEDURE — 258N000003 HC RX IP 258 OP 636: Performed by: OBSTETRICS & GYNECOLOGY

## 2022-02-09 PROCEDURE — 120N000012 HC R&B POSTPARTUM

## 2022-02-09 PROCEDURE — 272N000001 HC OR GENERAL SUPPLY STERILE: Performed by: OBSTETRICS & GYNECOLOGY

## 2022-02-09 PROCEDURE — 250N000011 HC RX IP 250 OP 636: Performed by: NURSE ANESTHETIST, CERTIFIED REGISTERED

## 2022-02-09 PROCEDURE — 250N000009 HC RX 250: Performed by: NURSE ANESTHETIST, CERTIFIED REGISTERED

## 2022-02-09 RX ORDER — DEXTROSE, SODIUM CHLORIDE, SODIUM LACTATE, POTASSIUM CHLORIDE, AND CALCIUM CHLORIDE 5; .6; .31; .03; .02 G/100ML; G/100ML; G/100ML; G/100ML; G/100ML
1000 INJECTION, SOLUTION INTRAVENOUS ONCE
Status: COMPLETED | OUTPATIENT
Start: 2022-02-09 | End: 2022-02-09

## 2022-02-09 RX ORDER — TRANEXAMIC ACID 10 MG/ML
1 INJECTION, SOLUTION INTRAVENOUS EVERY 30 MIN PRN
Status: DISCONTINUED | OUTPATIENT
Start: 2022-02-09 | End: 2022-02-12 | Stop reason: HOSPADM

## 2022-02-09 RX ORDER — MISOPROSTOL 200 UG/1
400 TABLET ORAL
Status: DISCONTINUED | OUTPATIENT
Start: 2022-02-09 | End: 2022-02-12 | Stop reason: HOSPADM

## 2022-02-09 RX ORDER — HYDROMORPHONE HCL IN WATER/PF 6 MG/30 ML
PATIENT CONTROLLED ANALGESIA SYRINGE INTRAVENOUS
Status: DISCONTINUED
Start: 2022-02-09 | End: 2022-02-09 | Stop reason: HOSPADM

## 2022-02-09 RX ORDER — OXYTOCIN 10 [USP'U]/ML
10 INJECTION, SOLUTION INTRAMUSCULAR; INTRAVENOUS
Status: DISCONTINUED | OUTPATIENT
Start: 2022-02-09 | End: 2022-02-12 | Stop reason: HOSPADM

## 2022-02-09 RX ORDER — AMOXICILLIN 250 MG
1 CAPSULE ORAL 2 TIMES DAILY
Status: DISCONTINUED | OUTPATIENT
Start: 2022-02-09 | End: 2022-02-12 | Stop reason: HOSPADM

## 2022-02-09 RX ORDER — MODIFIED LANOLIN
OINTMENT (GRAM) TOPICAL
Status: DISCONTINUED | OUTPATIENT
Start: 2022-02-09 | End: 2022-02-12 | Stop reason: HOSPADM

## 2022-02-09 RX ORDER — EPHEDRINE SULFATE 50 MG/ML
INJECTION, SOLUTION INTRAMUSCULAR; INTRAVENOUS; SUBCUTANEOUS PRN
Status: DISCONTINUED | OUTPATIENT
Start: 2022-02-09 | End: 2022-02-09

## 2022-02-09 RX ORDER — NALOXONE HYDROCHLORIDE 0.4 MG/ML
0.4 INJECTION, SOLUTION INTRAMUSCULAR; INTRAVENOUS; SUBCUTANEOUS
Status: DISCONTINUED | OUTPATIENT
Start: 2022-02-09 | End: 2022-02-12 | Stop reason: HOSPADM

## 2022-02-09 RX ORDER — MISOPROSTOL 200 UG/1
800 TABLET ORAL
Status: DISCONTINUED | OUTPATIENT
Start: 2022-02-09 | End: 2022-02-09

## 2022-02-09 RX ORDER — ONDANSETRON 2 MG/ML
INJECTION INTRAMUSCULAR; INTRAVENOUS PRN
Status: DISCONTINUED | OUTPATIENT
Start: 2022-02-09 | End: 2022-02-09

## 2022-02-09 RX ORDER — TRANEXAMIC ACID 10 MG/ML
1 INJECTION, SOLUTION INTRAVENOUS EVERY 30 MIN PRN
Status: DISCONTINUED | OUTPATIENT
Start: 2022-02-09 | End: 2022-02-09

## 2022-02-09 RX ORDER — OXYTOCIN 10 [USP'U]/ML
10 INJECTION, SOLUTION INTRAMUSCULAR; INTRAVENOUS
Status: DISCONTINUED | OUTPATIENT
Start: 2022-02-09 | End: 2022-02-09

## 2022-02-09 RX ORDER — METHYLERGONOVINE MALEATE 0.2 MG/ML
200 INJECTION INTRAVENOUS
Status: DISCONTINUED | OUTPATIENT
Start: 2022-02-09 | End: 2022-02-12 | Stop reason: HOSPADM

## 2022-02-09 RX ORDER — BISACODYL 10 MG
10 SUPPOSITORY, RECTAL RECTAL DAILY PRN
Status: DISCONTINUED | OUTPATIENT
Start: 2022-02-11 | End: 2022-02-12 | Stop reason: HOSPADM

## 2022-02-09 RX ORDER — METHYLERGONOVINE MALEATE 0.2 MG/ML
200 INJECTION INTRAVENOUS
Status: DISCONTINUED | OUTPATIENT
Start: 2022-02-09 | End: 2022-02-09

## 2022-02-09 RX ORDER — METOCLOPRAMIDE HYDROCHLORIDE 5 MG/ML
10 INJECTION INTRAMUSCULAR; INTRAVENOUS EVERY 6 HOURS PRN
Status: DISCONTINUED | OUTPATIENT
Start: 2022-02-09 | End: 2022-02-12 | Stop reason: HOSPADM

## 2022-02-09 RX ORDER — HYDROCORTISONE 2.5 %
CREAM (GRAM) TOPICAL 3 TIMES DAILY PRN
Status: DISCONTINUED | OUTPATIENT
Start: 2022-02-09 | End: 2022-02-12 | Stop reason: HOSPADM

## 2022-02-09 RX ORDER — MISOPROSTOL 200 UG/1
400 TABLET ORAL
Status: DISCONTINUED | OUTPATIENT
Start: 2022-02-09 | End: 2022-02-09

## 2022-02-09 RX ORDER — LIDOCAINE 40 MG/G
CREAM TOPICAL
Status: DISCONTINUED | OUTPATIENT
Start: 2022-02-09 | End: 2022-02-12 | Stop reason: HOSPADM

## 2022-02-09 RX ORDER — PROCHLORPERAZINE MALEATE 10 MG
10 TABLET ORAL EVERY 6 HOURS PRN
Status: DISCONTINUED | OUTPATIENT
Start: 2022-02-09 | End: 2022-02-12 | Stop reason: HOSPADM

## 2022-02-09 RX ORDER — ONDANSETRON 4 MG/1
4 TABLET, ORALLY DISINTEGRATING ORAL EVERY 6 HOURS PRN
Status: DISCONTINUED | OUTPATIENT
Start: 2022-02-09 | End: 2022-02-12 | Stop reason: HOSPADM

## 2022-02-09 RX ORDER — LORATADINE 10 MG/1
10 TABLET ORAL DAILY
Status: DISCONTINUED | OUTPATIENT
Start: 2022-02-09 | End: 2022-02-12 | Stop reason: HOSPADM

## 2022-02-09 RX ORDER — CITRIC ACID/SODIUM CITRATE 334-500MG
SOLUTION, ORAL ORAL
Status: COMPLETED
Start: 2022-02-09 | End: 2022-02-09

## 2022-02-09 RX ORDER — BUPIVACAINE HYDROCHLORIDE 7.5 MG/ML
INJECTION, SOLUTION INTRASPINAL PRN
Status: DISCONTINUED | OUTPATIENT
Start: 2022-02-09 | End: 2022-02-09

## 2022-02-09 RX ORDER — LIDOCAINE 40 MG/G
CREAM TOPICAL
Status: DISCONTINUED | OUTPATIENT
Start: 2022-02-09 | End: 2022-02-09

## 2022-02-09 RX ORDER — OXYTOCIN/0.9 % SODIUM CHLORIDE 30/500 ML
340 PLASTIC BAG, INJECTION (ML) INTRAVENOUS CONTINUOUS PRN
Status: DISCONTINUED | OUTPATIENT
Start: 2022-02-09 | End: 2022-02-12 | Stop reason: HOSPADM

## 2022-02-09 RX ORDER — NALBUPHINE HYDROCHLORIDE 10 MG/ML
2.5-5 INJECTION, SOLUTION INTRAMUSCULAR; INTRAVENOUS; SUBCUTANEOUS EVERY 6 HOURS PRN
Status: DISCONTINUED | OUTPATIENT
Start: 2022-02-09 | End: 2022-02-09

## 2022-02-09 RX ORDER — ACETAMINOPHEN 325 MG/1
975 TABLET ORAL ONCE
Status: COMPLETED | OUTPATIENT
Start: 2022-02-09 | End: 2022-02-09

## 2022-02-09 RX ORDER — SODIUM CHLORIDE, SODIUM LACTATE, POTASSIUM CHLORIDE, CALCIUM CHLORIDE 600; 310; 30; 20 MG/100ML; MG/100ML; MG/100ML; MG/100ML
INJECTION, SOLUTION INTRAVENOUS CONTINUOUS
Status: DISCONTINUED | OUTPATIENT
Start: 2022-02-09 | End: 2022-02-09

## 2022-02-09 RX ORDER — DEXTROSE, SODIUM CHLORIDE, SODIUM LACTATE, POTASSIUM CHLORIDE, AND CALCIUM CHLORIDE 5; .6; .31; .03; .02 G/100ML; G/100ML; G/100ML; G/100ML; G/100ML
INJECTION, SOLUTION INTRAVENOUS CONTINUOUS
Status: DISCONTINUED | OUTPATIENT
Start: 2022-02-09 | End: 2022-02-12 | Stop reason: HOSPADM

## 2022-02-09 RX ORDER — CARBOPROST TROMETHAMINE 250 UG/ML
250 INJECTION, SOLUTION INTRAMUSCULAR
Status: DISCONTINUED | OUTPATIENT
Start: 2022-02-09 | End: 2022-02-09

## 2022-02-09 RX ORDER — FENTANYL CITRATE 50 UG/ML
INJECTION, SOLUTION INTRAMUSCULAR; INTRAVENOUS PRN
Status: DISCONTINUED | OUTPATIENT
Start: 2022-02-09 | End: 2022-02-09

## 2022-02-09 RX ORDER — MISOPROSTOL 200 UG/1
800 TABLET ORAL
Status: DISCONTINUED | OUTPATIENT
Start: 2022-02-09 | End: 2022-02-12 | Stop reason: HOSPADM

## 2022-02-09 RX ORDER — MORPHINE SULFATE 1 MG/ML
INJECTION, SOLUTION EPIDURAL; INTRATHECAL; INTRAVENOUS PRN
Status: DISCONTINUED | OUTPATIENT
Start: 2022-02-09 | End: 2022-02-09

## 2022-02-09 RX ORDER — CARBOPROST TROMETHAMINE 250 UG/ML
250 INJECTION, SOLUTION INTRAMUSCULAR
Status: DISCONTINUED | OUTPATIENT
Start: 2022-02-09 | End: 2022-02-12 | Stop reason: HOSPADM

## 2022-02-09 RX ORDER — SIMETHICONE 80 MG
80 TABLET,CHEWABLE ORAL 4 TIMES DAILY PRN
Status: DISCONTINUED | OUTPATIENT
Start: 2022-02-09 | End: 2022-02-12 | Stop reason: HOSPADM

## 2022-02-09 RX ORDER — CEFAZOLIN SODIUM/WATER 2 G/20 ML
2 SYRINGE (ML) INTRAVENOUS SEE ADMIN INSTRUCTIONS
Status: DISCONTINUED | OUTPATIENT
Start: 2022-02-09 | End: 2022-02-09

## 2022-02-09 RX ORDER — NALOXONE HYDROCHLORIDE 0.4 MG/ML
0.2 INJECTION, SOLUTION INTRAMUSCULAR; INTRAVENOUS; SUBCUTANEOUS
Status: DISCONTINUED | OUTPATIENT
Start: 2022-02-09 | End: 2022-02-12 | Stop reason: HOSPADM

## 2022-02-09 RX ORDER — PROCHLORPERAZINE 25 MG
25 SUPPOSITORY, RECTAL RECTAL EVERY 12 HOURS PRN
Status: DISCONTINUED | OUTPATIENT
Start: 2022-02-09 | End: 2022-02-12 | Stop reason: HOSPADM

## 2022-02-09 RX ORDER — KETOROLAC TROMETHAMINE 30 MG/ML
INJECTION, SOLUTION INTRAMUSCULAR; INTRAVENOUS PRN
Status: DISCONTINUED | OUTPATIENT
Start: 2022-02-09 | End: 2022-02-09

## 2022-02-09 RX ORDER — CITRIC ACID/SODIUM CITRATE 334-500MG
30 SOLUTION, ORAL ORAL
Status: COMPLETED | OUTPATIENT
Start: 2022-02-09 | End: 2022-02-09

## 2022-02-09 RX ORDER — KETOROLAC TROMETHAMINE 30 MG/ML
30 INJECTION, SOLUTION INTRAMUSCULAR; INTRAVENOUS EVERY 6 HOURS
Status: COMPLETED | OUTPATIENT
Start: 2022-02-09 | End: 2022-02-10

## 2022-02-09 RX ORDER — HYDROMORPHONE HCL IN WATER/PF 6 MG/30 ML
.3-.5 PATIENT CONTROLLED ANALGESIA SYRINGE INTRAVENOUS
Status: DISCONTINUED | OUTPATIENT
Start: 2022-02-09 | End: 2022-02-12 | Stop reason: HOSPADM

## 2022-02-09 RX ORDER — ACETAMINOPHEN 325 MG/1
975 TABLET ORAL EVERY 6 HOURS
Status: DISCONTINUED | OUTPATIENT
Start: 2022-02-09 | End: 2022-02-12 | Stop reason: HOSPADM

## 2022-02-09 RX ORDER — AMOXICILLIN 250 MG
2 CAPSULE ORAL 2 TIMES DAILY
Status: DISCONTINUED | OUTPATIENT
Start: 2022-02-09 | End: 2022-02-12 | Stop reason: HOSPADM

## 2022-02-09 RX ORDER — BUPIVACAINE HYDROCHLORIDE 5 MG/ML
INJECTION, SOLUTION EPIDURAL; INTRACAUDAL
Status: COMPLETED
Start: 2022-02-09 | End: 2022-02-09

## 2022-02-09 RX ORDER — CEFAZOLIN SODIUM/WATER 2 G/20 ML
2 SYRINGE (ML) INTRAVENOUS
Status: COMPLETED | OUTPATIENT
Start: 2022-02-09 | End: 2022-02-09

## 2022-02-09 RX ORDER — OXYTOCIN/0.9 % SODIUM CHLORIDE 30/500 ML
100-340 PLASTIC BAG, INJECTION (ML) INTRAVENOUS CONTINUOUS PRN
Status: DISCONTINUED | OUTPATIENT
Start: 2022-02-09 | End: 2022-02-09

## 2022-02-09 RX ORDER — ACETAMINOPHEN 325 MG/1
TABLET ORAL
Status: COMPLETED
Start: 2022-02-09 | End: 2022-02-09

## 2022-02-09 RX ORDER — CEFAZOLIN SODIUM/WATER 2 G/20 ML
SYRINGE (ML) INTRAVENOUS
Status: COMPLETED
Start: 2022-02-09 | End: 2022-02-09

## 2022-02-09 RX ORDER — METOCLOPRAMIDE 10 MG/1
10 TABLET ORAL EVERY 6 HOURS PRN
Status: DISCONTINUED | OUTPATIENT
Start: 2022-02-09 | End: 2022-02-12 | Stop reason: HOSPADM

## 2022-02-09 RX ORDER — OXYCODONE HYDROCHLORIDE 5 MG/1
5-10 TABLET ORAL EVERY 4 HOURS PRN
Status: DISCONTINUED | OUTPATIENT
Start: 2022-02-09 | End: 2022-02-12 | Stop reason: HOSPADM

## 2022-02-09 RX ORDER — OXYTOCIN/0.9 % SODIUM CHLORIDE 30/500 ML
340 PLASTIC BAG, INJECTION (ML) INTRAVENOUS CONTINUOUS PRN
Status: DISCONTINUED | OUTPATIENT
Start: 2022-02-09 | End: 2022-02-09

## 2022-02-09 RX ORDER — EPHEDRINE SULFATE 50 MG/ML
5 INJECTION, SOLUTION INTRAMUSCULAR; INTRAVENOUS; SUBCUTANEOUS
Status: DISCONTINUED | OUTPATIENT
Start: 2022-02-09 | End: 2022-02-09

## 2022-02-09 RX ORDER — IBUPROFEN 400 MG/1
800 TABLET, FILM COATED ORAL EVERY 6 HOURS
Status: DISCONTINUED | OUTPATIENT
Start: 2022-02-10 | End: 2022-02-12 | Stop reason: HOSPADM

## 2022-02-09 RX ORDER — ONDANSETRON 2 MG/ML
4 INJECTION INTRAMUSCULAR; INTRAVENOUS EVERY 6 HOURS PRN
Status: DISCONTINUED | OUTPATIENT
Start: 2022-02-09 | End: 2022-02-12 | Stop reason: HOSPADM

## 2022-02-09 RX ADMIN — OXYCODONE HYDROCHLORIDE 5 MG: 5 TABLET ORAL at 20:06

## 2022-02-09 RX ADMIN — HYDROMORPHONE HYDROCHLORIDE 0.4 MG: 0.2 INJECTION, SOLUTION INTRAMUSCULAR; INTRAVENOUS; SUBCUTANEOUS at 10:58

## 2022-02-09 RX ADMIN — ACETAMINOPHEN 975 MG: 325 TABLET, FILM COATED ORAL at 06:04

## 2022-02-09 RX ADMIN — Medication 30 ML: at 07:01

## 2022-02-09 RX ADMIN — BUPIVACAINE HYDROCHLORIDE IN DEXTROSE 1.4 ML: 7.5 INJECTION, SOLUTION SUBARACHNOID at 07:31

## 2022-02-09 RX ADMIN — ONDANSETRON 4 MG: 2 INJECTION INTRAMUSCULAR; INTRAVENOUS at 07:51

## 2022-02-09 RX ADMIN — Medication 5 MG: at 08:09

## 2022-02-09 RX ADMIN — PHENYLEPHRINE HYDROCHLORIDE 0.5 MCG/KG/MIN: 10 INJECTION INTRAVENOUS at 07:34

## 2022-02-09 RX ADMIN — ACETAMINOPHEN 975 MG: 325 TABLET, FILM COATED ORAL at 13:04

## 2022-02-09 RX ADMIN — ONDANSETRON 4 MG: 2 INJECTION INTRAMUSCULAR; INTRAVENOUS at 08:04

## 2022-02-09 RX ADMIN — HYDROMORPHONE HYDROCHLORIDE 0.4 MG: 0.2 INJECTION, SOLUTION INTRAMUSCULAR; INTRAVENOUS; SUBCUTANEOUS at 14:05

## 2022-02-09 RX ADMIN — KETOROLAC TROMETHAMINE 30 MG: 30 INJECTION, SOLUTION INTRAMUSCULAR at 08:38

## 2022-02-09 RX ADMIN — Medication 5 MG: at 07:56

## 2022-02-09 RX ADMIN — SENNOSIDES AND DOCUSATE SODIUM 1 TABLET: 50; 8.6 TABLET ORAL at 22:12

## 2022-02-09 RX ADMIN — KETOROLAC TROMETHAMINE 30 MG: 30 INJECTION, SOLUTION INTRAMUSCULAR; INTRAVENOUS at 14:43

## 2022-02-09 RX ADMIN — HYDROMORPHONE HYDROCHLORIDE 0.4 MG: 0.2 INJECTION, SOLUTION INTRAMUSCULAR; INTRAVENOUS; SUBCUTANEOUS at 16:06

## 2022-02-09 RX ADMIN — FENTANYL CITRATE 50 MCG: 50 INJECTION, SOLUTION INTRAMUSCULAR; INTRAVENOUS at 08:09

## 2022-02-09 RX ADMIN — SODIUM CHLORIDE, SODIUM LACTATE, POTASSIUM CHLORIDE, CALCIUM CHLORIDE AND DEXTROSE MONOHYDRATE 1000 ML: 5; 600; 310; 30; 20 INJECTION, SOLUTION INTRAVENOUS at 22:10

## 2022-02-09 RX ADMIN — FENTANYL CITRATE 100 MCG: 50 INJECTION, SOLUTION INTRAMUSCULAR; INTRAVENOUS at 08:14

## 2022-02-09 RX ADMIN — MORPHINE SULFATE 0.15 MG: 1 INJECTION, SOLUTION EPIDURAL; INTRATHECAL; INTRAVENOUS at 07:34

## 2022-02-09 RX ADMIN — MIDAZOLAM 1 MG: 1 INJECTION INTRAMUSCULAR; INTRAVENOUS at 08:16

## 2022-02-09 RX ADMIN — KETOROLAC TROMETHAMINE 30 MG: 30 INJECTION, SOLUTION INTRAMUSCULAR; INTRAVENOUS at 20:47

## 2022-02-09 RX ADMIN — SODIUM CHLORIDE, SODIUM LACTATE, POTASSIUM CHLORIDE, CALCIUM CHLORIDE AND DEXTROSE MONOHYDRATE: 5; 600; 310; 30; 20 INJECTION, SOLUTION INTRAVENOUS at 22:56

## 2022-02-09 RX ADMIN — ACETAMINOPHEN 975 MG: 325 TABLET, FILM COATED ORAL at 19:06

## 2022-02-09 RX ADMIN — MIDAZOLAM 1 MG: 1 INJECTION INTRAMUSCULAR; INTRAVENOUS at 08:15

## 2022-02-09 RX ADMIN — Medication 340 ML/HR: at 07:49

## 2022-02-09 RX ADMIN — Medication 2 G: at 07:24

## 2022-02-09 RX ADMIN — SODIUM CITRATE AND CITRIC ACID MONOHYDRATE 30 ML: 500; 334 SOLUTION ORAL at 07:01

## 2022-02-09 RX ADMIN — SODIUM CHLORIDE, POTASSIUM CHLORIDE, SODIUM LACTATE AND CALCIUM CHLORIDE: 600; 310; 30; 20 INJECTION, SOLUTION INTRAVENOUS at 05:52

## 2022-02-09 RX ADMIN — SODIUM CHLORIDE, SODIUM LACTATE, POTASSIUM CHLORIDE, CALCIUM CHLORIDE AND DEXTROSE MONOHYDRATE: 5; 600; 310; 30; 20 INJECTION, SOLUTION INTRAVENOUS at 12:03

## 2022-02-09 RX ADMIN — ACETAMINOPHEN 975 MG: 325 TABLET ORAL at 06:04

## 2022-02-09 RX ADMIN — SODIUM CHLORIDE, SODIUM LACTATE, POTASSIUM CHLORIDE, CALCIUM CHLORIDE AND DEXTROSE MONOHYDRATE: 5; 600; 310; 30; 20 INJECTION, SOLUTION INTRAVENOUS at 20:17

## 2022-02-09 RX ADMIN — FENTANYL CITRATE 50 MCG: 50 INJECTION, SOLUTION INTRAMUSCULAR; INTRAVENOUS at 08:11

## 2022-02-09 ASSESSMENT — ACTIVITIES OF DAILY LIVING (ADL)
ADLS_ACUITY_SCORE: 5
ADLS_ACUITY_SCORE: 3
ADLS_ACUITY_SCORE: 3
ADLS_ACUITY_SCORE: 5
ADLS_ACUITY_SCORE: 3
ADLS_ACUITY_SCORE: 3
ADLS_ACUITY_SCORE: 5
ADLS_ACUITY_SCORE: 5
ADLS_ACUITY_SCORE: 3
ADLS_ACUITY_SCORE: 5
ADLS_ACUITY_SCORE: 5
ADLS_ACUITY_SCORE: 3
ADLS_ACUITY_SCORE: 5
ADLS_ACUITY_SCORE: 5

## 2022-02-09 ASSESSMENT — MIFFLIN-ST. JEOR: SCORE: 1260.61

## 2022-02-09 NOTE — L&D DELIVERY NOTE
Obstetrics Brief Operative Note    Pre-operative diagnosis: IUP @ 39+2 with prior near CPD at 31 weeks and now 39+ weeks with planned scheduled c/s  Bicornuate uterus   Post-operative diagnosis: Same   Procedure: Primary low transverse  section   Surgeon: Tana Ireland MD   Assistant(s): None   Anesthesia: Spinal anesthesia and local 20cc of 1/2% marcaine   Estimated blood loss: 530ml   Complications: None   Findings: Live   Male  Cephalic presentation:  left occiput transverse  APGARS: 1 minute: 8   5 minute: 9  Weight: 6-2#  Placenta: normal  Tubes: normal  Uterus: larger right horn with fetus and small lower horn with 2 small transmural fibroids less than 2-3cm each  Ovaries: normal  Clear AF  Fetal station low and difficult to elevate out of pelvis as very short torso but able to do so  Spinal inadequate so required local in fascia and subq     Primary OB: Shu

## 2022-02-09 NOTE — PLAN OF CARE
Pt transferred to [facility] in bed with baby in her arms.    ID Bands checked with [facility] nurse.    Report given to MANNIE Williamson RN, who will assume cares.

## 2022-02-09 NOTE — ANESTHESIA PREPROCEDURE EVALUATION
Anesthesia Pre-Procedure Evaluation    Patient: Annie Clark   MRN: [id number] : 1985        Preoperative Diagnosis: Supervision of high risk elderly multigravida in third trimester [O09.523]    Procedure : Procedure(s):  PRIMARY  SECTION          Past Medical History:   Diagnosis Date     Cervical high risk HPV (human papillomavirus) test positive 2019    10/7/20     History of colposcopy 2019     Monoallelic mutation of BRIP1 gene       labor in third trimester with  delivery 2019    PROM, delivered 31wks 1d     Urinary urgency 10/30/2015      Past Surgical History:   Procedure Laterality Date     C TRANSNASAL EUSTACH TUBE INFLATE,CATH       MANDIBLE SURGERY      lower and upper jaw surgery     PARTIAL HYMENECTOMY/REVISION HYMENAL RING  2008    Done by [facility]     TONSILLECTOMY        Allergies   Allergen Reactions     Nuts Anaphylaxis and Nausea and Vomiting     Peas Nausea and Vomiting and Swelling     Thimerosal Other (See Comments) and Swelling     Dry skin, redness      Social History     Tobacco Use     Smoking status: Never Smoker     Smokeless tobacco: Never Used   Substance Use Topics     Alcohol use: No     Alcohol/week: 0.0 standard drinks      Wt Readings from Last 1 Encounters:   22 64.4 kg (142 lb)        Anesthesia Evaluation            ROS/MED HX  ENT/Pulmonary:    (-) asthma, sleep apnea and recent URI   Neurologic:       Cardiovascular:  - neg cardiovascular ROS     METS/Exercise Tolerance:     Hematologic:       Musculoskeletal:       GI/Hepatic:     (+) GERD,  (-) liver disease   Renal/Genitourinary:    (-) renal disease   Endo:       Psychiatric/Substance Use:       Infectious Disease:       Malignancy:       Other:            Physical Exam    Airway        Mallampati: II   TM distance: > 3 FB   Neck ROM: full     Respiratory Devices and Support         Dental  no notable dental history         Cardiovascular   cardiovascular exam  normal          Pulmonary           breath sounds clear to auscultation           OUTSIDE LABS:  CBC:   Lab Results   Component Value Date    WBC 12.1 (H) 02/07/2022    WBC 11.7 (H) 11/22/2021    HGB 15.5 02/07/2022    HGB 13.6 11/22/2021    HCT 45.0 02/07/2022    HCT 40.3 11/22/2021     02/07/2022     11/22/2021     BMP:   Lab Results   Component Value Date     05/01/2019    POTASSIUM 3.5 05/01/2019    CHLORIDE 108 05/01/2019    CO2 22 05/01/2019    BUN 3 (L) 05/01/2019    CR 0.44 (L) 05/01/2019     (H) 05/01/2019     COAGS: No results found for: PTT, INR, FIBR  POC:   Lab Results   Component Value Date    HCG Negative 12/02/2020     HEPATIC: No results found for: ALBUMIN, PROTTOTAL, ALT, AST, GGT, ALKPHOS, BILITOTAL, BILIDIRECT, AMBER  OTHER:   Lab Results   Component Value Date    MIRANDA 8.5 05/03/2019    MAG 7.7 (HH) 05/01/2019       Anesthesia Plan    ASA Status:  2      Anesthesia Type: Spinal.              Consents    Anesthesia Plan(s) and associated risks, benefits, and realistic alternatives discussed. Questions answered and patient/representative(s) expressed understanding.    - Discussed:     - Discussed with:  Patient         Postoperative Care       PONV prophylaxis: Ondansetron (or other 5HT-3)     Comments:                Carrie Burnett

## 2022-02-09 NOTE — ANESTHESIA PROCEDURE NOTES
Intrathecal injection Procedure Note    Pre-Procedure   Staff -        Anesthesiologist:  Carrie Burnett       Performed By: anesthesiologist       Location: OR       Pre-Anesthestic Checklist: patient identified, IV checked, risks and benefits discussed, informed consent, monitors and equipment checked and pre-op evaluation  Timeout:       Correct Patient: Yes        Correct Procedure: Yes        Correct Site: Yes        Correct Position: Yes   Procedure Documentation  Procedure: intrathecal injection       Patient Position: sitting       Patient Prep/Sterile Barriers: sterile gloves, mask, patient draped       Skin prep: Betadine       Insertion Site: L3-4. (midline approach).       Needle Gauge: 25.        Needle Length (Inches): 3.5        Spinal Needle Type: Pencan       Introducer used       Introducer: 20 G       # of attempts: 1 and  # of redirects:  0    Assessment/Narrative         Paresthesias: No.       CSF fluid: clear.     Comments:  EZ injection of 0.75% Marcaine - no paresthesias on injection or s/s IV.   Immediately to supine.   Pt mildly sedated, but communicative throughout procedure.   Pt tolerated well.    No complications.

## 2022-02-09 NOTE — PLAN OF CARE
Patient transferred to [facility] about 1110,oriented to room,call light,safety measures reviewed,vss,pain control with tylenol,I/v tordal&I/v dilaudid,jorge draining ok,ambulated in room,c/o light headed while up.hanane care&catheter care done,working on breast feeding.Will continue to monitor.

## 2022-02-09 NOTE — L&D DELIVERY NOTE
Delivery Date: 2022    PREOPERATIVE DIAGNOSES:  1.  Intrauterine pregnancy at 39 + 2 with a prior near cephalopelvic disproportion with only a 31-week delivery; therefore planned  section at this time.  2.  Bicornuate uterus.    POSTOPERATIVE DIAGNOSES:    1.  Intrauterine pregnancy at 39 + 2 with a prior near cephalopelvic disproportion with only a 31-week delivery; therefore planned  section at this time.  2.  Bicornuate uterus.    PROCEDURE PERFORMED:  Primary low transverse  section.    SURGEON:  Tana Ireland MD     ANESTHESIA:  Spinal anesthesia as well as 20 mL of 0.5% plain Marcaine in the fascia and subcu.    ESTIMATED BLOOD LOSS:  530 mL.    COMPLICATIONS:  None.    SPECIMENS:  None.    INDICATIONS FOR PROCEDURE:  Annie is a 36-year-old  2, para, whose pregnancy was followed by myself.  The patient is blood type B positive.  She is rubella immune and she is group B strep negative.  The patient had NIPT testing that was normal and received flu shot, Tdap shots and Moderna shots during her pregnancy.  The patient had  labor with her first at 27 weeks, requiring hospitalization, but then discharged to home, but then did represent at 30 weeks and delivered at 31 weeks.  It was a very tight fit just through the introitus, but also through the bony pelvis with her 31 weeker, who was only 3 pounds 5 ounces.  We discussed with this pregnancy that due to her receiving Zayda because of a history of  labor that if the patient went significantly further than the 31 weeks that she had gone previously, a primary  section would likely be advised.  We continued to keep this as an option, although the patient had wanted to potentially consider another vaginal delivery.  However, at her 36-week appointment an attempt at cervical exam was incredibly difficult, as her pelvic structure was incredibly narrow and the patient barely tolerated a 1 finger digital exam and  decision was made that primary  section was best with this pregnancy as had been previously planned and scheduled for today, at which time she was 39 weeks 2 days.    OPERATIVE FINDINGS:  There was delivery of a viable male infant via the left occiput transverse position at 7:47 a.m.  His weight was 6 pounds 2 ounces and his Apgars were 8 and 9 at 1 and 5 minutes respectively.  The infant was quite low in the pelvis, but because the patient is only 4 feet 11 and with the bicornuate uterus, it was very difficult to get his head elevated out of the pelvis as we were unable essentially to push him up higher into her fundus.  However, finally without any significant issue, we were able to just flex the head and he did deliver.  The placenta was normal and there was clear amniotic fluid.  The umbilical cord was normal with 3 vessels.  The fallopian tubes and ovaries were normal bilaterally.  The infant was delivered from the right uterine horn, which was significantly larger than the left.  The left was initially not even noted and then was noted basically in her left lower quadrant.  There were 2 what appeared to be transmural fibroids in the left uterine horn at what would be the fundus of that horn.  Other than this, the only issue was that the patient being so petite, had some difficult dosing for her spinal to ensure that it did not go too high; however, it did make a spinal somewhat inadequate then.  The patient had a lot of intestinal pushing, slowing and making peritoneal and fascial closure difficult and did receive some IV sedation and pain medication.  She also did receive the 20 mL of local 0.5% Marcaine in her fascia and subcu after fascial closure for improved pain control.    DESCRIPTION OF PROCEDURE:  The patient was taken to the operating room, where spinal anesthesia was obtained.  The patient was placed in dorsal supine position with leftward tilt and prepped and draped in normal fashion.   After anesthesia was tested and a timeout was undertaken, a Pfannenstiel skin incision was made using the scalpel.  The subcutaneous tissues were then taken down with cautery and the fascia, nicked on either side of midline.  Fascial incision was then extended superolaterally with Romo scissors.  Two Kocher clamps were then used to elevate the fascia off the underlying rectus muscles and the 2 were  from each other with Romo scissors and blunt dissection.  The mid position of the rectus muscles was already  and so this was extended.  The peritoneum was then bluntly entered and then extended with blunt pull technique and a bladder blade was placed.  The vesicouterine peritoneal reflection was then identified on the right horn and at this point, the left horn was not even yet visible.  This was elevated with a pickup and incised with Metzenbaum scissors.  A bladder flap was created with finger dissection and a bladder blade was placed.  A transverse incision was made in the lower uterine segment.  Upon entry into the uterine cavity, blunt finger dissection was used to extend the uterine incision.  A hand was then placed over the vertex of the infant and as previously mentioned, the infant's head was quite low and yet elevation of the infant up out of the pelvis was essentially almost impossible, as there was no room to elevate him due to the bicornuate uterus and due to the patient's small size.  However, we were then able to flex the head and get the head to the incision and then with fundal pressure, the infant delivered easily and atraumatically.  The infant's cord clamping was delayed for 1 minute and then the cord was doubly clamped and cut, and he was handed off to the awaiting nursing team.  A cord segment and cord blood were collected and the placenta was manually extracted.  The uterus was then exteriorized and at this point, the left horn was still not even identify.  The interior of the  uterus was wiped clean of all clots and debris and the perimeter of the incision was grasped with ring forceps.  Initially, it appeared that there was a uterine extension down into the left cervix and upper vagina, but it was at this point with deviation of the uterus to her right that the left uterine horn elevated out of the pelvis and it was seen that this was not actually an extension, it was just simply into the lower uterine segment of the left horn in a normal fashion.  The uterine incision was then closed with a running locked 0 Vicryl stitch from the left corner through to the right.  There was second layer closure done using 0 Vicryl in a horizontal imbricating fashion from the left corner through to the right.  Three additional figure-of-X sutures were placed to near the right of midline and 1 at the left corner where the left uterine horn was for some additional bleeding.  Some cautery was done of the incision for any additional oozing and the incision was then hemostatic.  The uterus was then replaced into the pelvis.  The pelvis was then irrigated and cleaned of all clots and debris.  This is when the patient's discomfort significantly increased and Anesthesia was dosing some IV sedation and pain medication to help with this.  The peritoneal edges were then carefully grasped with a [place] and using a malleable retractor and hand retraction, the bowel was intermittently replaced back into the abdomen and the peritoneum was eventually carefully closed with a 3-0 Vicryl in a running unlocked fashion from the superior aspect to the inferior.  The rectus muscles were then inspected and any areas of oozing were cauterized.  The fascia was then closed with a running 0 Vicryl stitch from the left corner through to the right.  At this point, 20 mL of 0.5% plain Marcaine were injected in the subcu and fascia for additional pain control.  The subcu was then irrigated and any areas of oozing were cauterized.  The  skin was then finally closed with 4-0 Monocryl in a subcuticular fashion.    The patient tolerated the procedure well as previously mentioned and there were no complications.  All sponge, lap and instrument counts were correct x2.    DRAINS:  Herron catheter.    DISPOSITION:  The patient was taken to the postanesthesia recovery room along with her  infant in stable condition.    Tana Ireland MD        D: 2022   T: 2022   MT: [id number]    Name:     BRADEN FORTUNE  MRN:      [id number]        Account:       [id number]   :      1985           Delivery Date: 2022     Document: [id number]

## 2022-02-09 NOTE — ANESTHESIA POSTPROCEDURE EVALUATION
Patient: Annie Clark    Procedure: Procedure(s):  PRIMARY  SECTION       Diagnosis:Supervision of high risk elderly multigravida in third trimester [O09.523]  Diagnosis Additional Information: No value filed.    Anesthesia Type:  Spinal    Note:  Disposition: Admission   Postop Pain Control: Uneventful            Sign Out: Well controlled pain   PONV: No   Neuro/Psych: Uneventful            Sign Out: Acceptable/Baseline neuro status   Airway/Respiratory: Uneventful            Sign Out: Acceptable/Baseline resp. status   CV/Hemodynamics: Uneventful            Sign Out: Acceptable CV status   Other NRE:    DID A NON-ROUTINE EVENT OCCUR? No           Last vitals:  Vitals Value Taken Time   /84 22 1045   Temp 36.3  C (97.3  F) 22 0915   Pulse 92 22 1045   Resp 16 22 1045   SpO2 98 % 22 1051   Vitals shown include unvalidated device data.    Electronically Signed By: Carrie Burnett  2022  2:09 PM

## 2022-02-09 NOTE — ANESTHESIA CARE TRANSFER NOTE
Patient: Annie Clark    Procedure: Procedure(s):  PRIMARY  SECTION       Diagnosis: Supervision of high risk elderly multigravida in third trimester [O09.523]  Diagnosis Additional Information: No value filed.    Anesthesia Type:   Spinal     Note:    Oropharynx: oropharynx clear of all foreign objects  Level of Consciousness: awake  Oxygen Supplementation: room air    Independent Airway: airway patency satisfactory and stable  Dentition: dentition unchanged  Vital Signs Stable: post-procedure vital signs reviewed and stable  Report to RN Given: handoff report given  Patient transferred to: Labor and Delivery    Handoff Report: Identifed the Patient, Identified the Reponsible Provider, Reviewed the pertinent medical history, Discussed the surgical course, Reviewed Intra-OP anesthesia mangement and issues during anesthesia, Set expectations for post-procedure period and Allowed opportunity for questions and acknowledgement of understanding      Electronically Signed By: GUS Juarez CRNA  2022  9:04 AM

## 2022-02-09 NOTE — PLAN OF CARE
0710: Care assumed from Ines CASTRO at bedside report. Pt here for scheduled , and was prepped in usual fashion.  Dr. Ireland at bedside to see Pt to discuss POC/Obtain Informed consent.    0720: Issues with obtaining E-Consent, so paper consent obtained for  and Blood products.

## 2022-02-09 NOTE — H&P
[facility]    History and Physical  Obstetrics and Gynecology     Date of Admission:  2022    Assessment & Plan   Annie Clark is a 36 year old female who presents for Primary  section.     ASSESSMENT:   IUP @ 39w2d with CPD at 31 weeks now 39 weeks        PLAN:   Scheduled for  section.  No questions.      Tana Ireland    History of Present Illness   Annie Clark is a 36 year old female  39w2d  Estimated Date of Delivery: 2022 is calculated from Patient's last menstrual period was 05/10/2021. [facility].  Patient  Had 31 week delivery with CPD so now that has gone to 39 weeks has planned c/s. S/p ariel    PRENATAL COURSE  Prenatal course was complicated by above      Recent Labs   Lab Test 22  1019 21  1012 19  0010   ABO  --   --  B   RH  --   --  Pos   AS Negative   < > Neg    < > = values in this interval not displayed.     Rhogam not indicated   Recent Labs   Lab Test 21  1012 19  0030   HEPBANG Nonreactive  --    HIAGAB Nonreactive  --    GBS  --  Negative   RUQIGG Positive*  --          Prior to Admission Medications   Prior to Admission Medications   Prescriptions Last Dose Informant Patient Reported? Taking?   Loratadine (CLARITIN PO) Past Month at Unknown time  Yes Yes   Prenatal Vit-Fe Fumarate-FA (PRENATAL 1+1 OR) 2022 at Unknown time  Yes Yes   hydrOXYzine (ATARAX) 50 MG tablet Past Week at Unknown time  No Yes   Sig: Take 1-2 tabs po at bedtime prn for sleep/tennille braden. Can also do 1/2 tab (25mg) for daytime tennille braden/anxiety   hydrOXYzine (VISTARIL) 50 MG capsule   No No   Sig: Take 1-2 capsules ( mg) by mouth 3 times daily as needed for other (insomnia)   polyethylene glycol (MIRALAX) 17 g packet Past Month at Unknown time  Yes Yes   Sig: Take 1 packet by mouth daily Takes 1/3 cup       Facility-Administered Medications: None     Allergies    Allergies   Allergen Reactions     Nuts Anaphylaxis and Nausea and Vomiting     Peas Nausea and Vomiting and Swelling     Thimerosal Other (See Comments) and Swelling     Dry skin, redness         Immunization History   Immunization History   Administered Date(s) Administered     COVID-19,PF,Moderna 2021, 2021, 2021     FLU 6-35 months 10/15/2019     Flu, Unspecified 10/13/2020     Influenza Vaccine IM > 6 months Valent IIV4 (Alfuria,Fluzone) 2018, 2021     TDAP Vaccine (Adacel) 04/10/2019     Tdap (Adacel,Boostrix) 2021       Past Medical History:   Diagnosis Date     Cervical high risk HPV (human papillomavirus) test positive 2019    10/7/20     History of colposcopy 2019     Monoallelic mutation of BRIP1 gene       labor in third trimester with  delivery 2019    PROM, delivered 31wks 1d     Urinary urgency 10/30/2015       Past Surgical History:   Procedure Laterality Date     C TRANSNASAL EUSTACH TUBE INFLATE,CATH       MANDIBLE SURGERY      lower and upper jaw surgery     PARTIAL HYMENECTOMY/REVISION HYMENAL RING  2008    Done by [facility]     TONSILLECTOMY         Clinic vitals from today:  /79 , 76, afebrile, covid neg    Abdomen: gravid, single vertex fetus, non-tender, EFW 6 lbs 8    Constitutional: healthy, alert, active and no distress   Extremities: NT, no edema  Neurologic: Awake, alert, oriented x3  Neuropsychiatric: General: normal, calm and normal eye contact  Heart: Regular rate and rhythm  Lungs: clear to ausculation bilaterally    Tana Ireland MD

## 2022-02-10 LAB — HGB BLD-MCNC: 9.7 G/DL (ref 11.7–15.7)

## 2022-02-10 PROCEDURE — 120N000012 HC R&B POSTPARTUM

## 2022-02-10 PROCEDURE — 36415 COLL VENOUS BLD VENIPUNCTURE: CPT | Performed by: OBSTETRICS & GYNECOLOGY

## 2022-02-10 PROCEDURE — 85018 HEMOGLOBIN: CPT | Performed by: OBSTETRICS & GYNECOLOGY

## 2022-02-10 PROCEDURE — 250N000011 HC RX IP 250 OP 636: Performed by: OBSTETRICS & GYNECOLOGY

## 2022-02-10 PROCEDURE — 250N000013 HC RX MED GY IP 250 OP 250 PS 637: Performed by: OBSTETRICS & GYNECOLOGY

## 2022-02-10 RX ADMIN — OXYCODONE HYDROCHLORIDE 10 MG: 5 TABLET ORAL at 09:00

## 2022-02-10 RX ADMIN — IBUPROFEN 800 MG: 400 TABLET, FILM COATED ORAL at 22:08

## 2022-02-10 RX ADMIN — IBUPROFEN 800 MG: 400 TABLET, FILM COATED ORAL at 15:16

## 2022-02-10 RX ADMIN — OXYCODONE HYDROCHLORIDE 10 MG: 5 TABLET ORAL at 17:43

## 2022-02-10 RX ADMIN — OXYCODONE HYDROCHLORIDE 5 MG: 5 TABLET ORAL at 00:29

## 2022-02-10 RX ADMIN — IBUPROFEN 800 MG: 400 TABLET, FILM COATED ORAL at 09:00

## 2022-02-10 RX ADMIN — OXYCODONE HYDROCHLORIDE 10 MG: 5 TABLET ORAL at 22:07

## 2022-02-10 RX ADMIN — ACETAMINOPHEN 975 MG: 325 TABLET, FILM COATED ORAL at 06:52

## 2022-02-10 RX ADMIN — ACETAMINOPHEN 975 MG: 325 TABLET, FILM COATED ORAL at 19:03

## 2022-02-10 RX ADMIN — OXYCODONE HYDROCHLORIDE 10 MG: 5 TABLET ORAL at 04:29

## 2022-02-10 RX ADMIN — ACETAMINOPHEN 975 MG: 325 TABLET, FILM COATED ORAL at 13:18

## 2022-02-10 RX ADMIN — KETOROLAC TROMETHAMINE 30 MG: 30 INJECTION, SOLUTION INTRAMUSCULAR; INTRAVENOUS at 02:49

## 2022-02-10 RX ADMIN — SIMETHICONE 80 MG: 80 TABLET, CHEWABLE ORAL at 09:00

## 2022-02-10 RX ADMIN — SENNOSIDES AND DOCUSATE SODIUM 1 TABLET: 50; 8.6 TABLET ORAL at 09:00

## 2022-02-10 RX ADMIN — SIMETHICONE 80 MG: 80 TABLET, CHEWABLE ORAL at 22:08

## 2022-02-10 RX ADMIN — SIMETHICONE 80 MG: 80 TABLET, CHEWABLE ORAL at 02:12

## 2022-02-10 RX ADMIN — ACETAMINOPHEN 975 MG: 325 TABLET, FILM COATED ORAL at 00:28

## 2022-02-10 RX ADMIN — SENNOSIDES AND DOCUSATE SODIUM 2 TABLET: 50; 8.6 TABLET ORAL at 22:06

## 2022-02-10 RX ADMIN — OXYCODONE HYDROCHLORIDE 10 MG: 5 TABLET ORAL at 13:19

## 2022-02-10 RX ADMIN — SIMETHICONE 80 MG: 80 TABLET, CHEWABLE ORAL at 15:16

## 2022-02-10 ASSESSMENT — ACTIVITIES OF DAILY LIVING (ADL)
ADLS_ACUITY_SCORE: 5
ADLS_ACUITY_SCORE: 3
ADLS_ACUITY_SCORE: 5
ADLS_ACUITY_SCORE: 3
ADLS_ACUITY_SCORE: 5
ADLS_ACUITY_SCORE: 3
ADLS_ACUITY_SCORE: 3
ADLS_ACUITY_SCORE: 5
ADLS_ACUITY_SCORE: 3
ADLS_ACUITY_SCORE: 5
ADLS_ACUITY_SCORE: 3
ADLS_ACUITY_SCORE: 5
ADLS_ACUITY_SCORE: 5
ADLS_ACUITY_SCORE: 3

## 2022-02-10 NOTE — PROGRESS NOTES
Annie Clark  February 10, 2022    S: pt is doing well.  Tolerating po intake and pain is well controlled.      O:/70 (BP Location: Left arm, Patient Position: Sitting)   Pulse 87   Temp 97.5  F (36.4  C) (Oral)   Resp 18   Ht 1.524 m (5')   Wt 64.4 kg (142 lb)   LMP 05/10/2021   SpO2 98%   Breastfeeding Unknown   BMI 27.73 kg/m    Recent Labs   Lab 02/07/22  1019   HGB 15.5     Abdomen: soft, non distended, fundus firm below the umbilicus.  Incision covered with bandage  Ext: non tender, no edema or erythema    A/P: s/p LTCS POD #1  Doing well  Continue care  Discharge planning for Friday or Saturday     Marge Fernandez MD

## 2022-02-10 NOTE — LACTATION NOTE
"Initial visit with Mother and Father and baby boy.    Mother states breast feeding is going okay.  This is her first breast feeding experience.  She attempted to breast feed her first child but he was in the NICU and Mother ended up pumping and bottle feeding.      Infant at breast at time of visit.  LC reviewed with Mother proper positioning of infant, maternal hand placement, using breast feeding support pillows, and how to help infant achieve a deep latch with feedings.  Reviewed importance of getting a deep latch with feedings versus a shallow latch.  LC assisted mother to get infant latched onto both breast in the cross cradle position.  Good latch noted with strong, continuous suckle pattern.  Infant tolerates feeding well.  Mother able to feel the difference between a bad latch and a good latch.    Mother and Father educated on normal  behavior, focusing on normal feeding patterns from birth to day 3 of life.   Discussed  breastfeeding basics: 1) watch for early feeding cues (licking lips, stirring or rooting, sucking movement with mouth, hands to mouth), 2) feed infant on demand, a minimum of 8 times in 24 hours, and 3) techniques to waking a sleepy baby to nurse (undress infant, change diaper if necessary, gently stroking bottom of feet and back, snuggling infant skin to skin, expressing colostrum).   Reviewed pump use and different kinds of pumps.    Breastfeeding general information reviewed. Reviewed with Mother and Father the breastfeeding section in admission booklet \"Guide to Postpartum and [place] Care\"  and encouraged to record infant feedings, voids, and stools in the feeding log.    Encouraged rooming in, skin to skin, feeding on demand 8-12x/day or sooner if baby cues.    Encouraged Mother to call for assistance with latch or positioning if needed.  Appreciative of visit.  No further questions at this time. Will follow as needed.     Desiree Brown RN, IBCLC     "

## 2022-02-10 NOTE — PLAN OF CARE
Patient delivered via . Vital signs have been stable. Postpartum assessment WDL. Incision dressing required reinforcement to the bottom of dressing no drainage noted. Pain controlled with Oxycodone, Toradol, Simethicone and Tylenol and used a nonpharmacologic method heat packs. Patient ambulating with assist. Patient is passing gas. Tolerating and drinking fluids and was able to eat yesterday evening. Breastfeeding on cue with minimal assist. Patient and infant bonding well.  Urine output was minimal yesterday evening but improved shortly after starting a 1 L bolus. Will continue with current plan of care. At 0445, new pressure dressing and ABD pad applied to incision. Incision looks clean without active bleeding or any separation of skin.

## 2022-02-10 NOTE — PROVIDER NOTIFICATION
02/09/22 2158   Provider Notification   Provider Name/Title Marge Fernandez MD   Method of Notification Phone   Notification Reason Status Update

## 2022-02-10 NOTE — PROVIDER NOTIFICATION
Spoke with Dr. Fernandez concerning patient's urine output of 50 mL from 1900 to just before 2200. Informed her that patient has had fluid running previously and she just started drinking fluids. Received an order for IV Fluid bolus and continue fluid overnight.

## 2022-02-11 PROBLEM — D62 ANEMIA DUE TO BLOOD LOSS, ACUTE: Status: ACTIVE | Noted: 2022-02-11

## 2022-02-11 PROCEDURE — 250N000013 HC RX MED GY IP 250 OP 250 PS 637: Performed by: OBSTETRICS & GYNECOLOGY

## 2022-02-11 PROCEDURE — 250N000011 HC RX IP 250 OP 636: Performed by: OBSTETRICS & GYNECOLOGY

## 2022-02-11 PROCEDURE — 120N000012 HC R&B POSTPARTUM

## 2022-02-11 RX ADMIN — SENNOSIDES AND DOCUSATE SODIUM 2 TABLET: 50; 8.6 TABLET ORAL at 20:19

## 2022-02-11 RX ADMIN — OXYCODONE HYDROCHLORIDE 5 MG: 5 TABLET ORAL at 06:36

## 2022-02-11 RX ADMIN — ACETAMINOPHEN 975 MG: 325 TABLET, FILM COATED ORAL at 14:27

## 2022-02-11 RX ADMIN — SIMETHICONE 80 MG: 80 TABLET, CHEWABLE ORAL at 04:11

## 2022-02-11 RX ADMIN — SENNOSIDES AND DOCUSATE SODIUM 2 TABLET: 50; 8.6 TABLET ORAL at 08:34

## 2022-02-11 RX ADMIN — ONDANSETRON 4 MG: 4 TABLET, ORALLY DISINTEGRATING ORAL at 08:44

## 2022-02-11 RX ADMIN — OXYCODONE HYDROCHLORIDE 10 MG: 5 TABLET ORAL at 02:06

## 2022-02-11 RX ADMIN — ACETAMINOPHEN 975 MG: 325 TABLET, FILM COATED ORAL at 08:34

## 2022-02-11 RX ADMIN — ACETAMINOPHEN 975 MG: 325 TABLET, FILM COATED ORAL at 20:19

## 2022-02-11 RX ADMIN — IBUPROFEN 800 MG: 400 TABLET, FILM COATED ORAL at 21:19

## 2022-02-11 RX ADMIN — ACETAMINOPHEN 975 MG: 325 TABLET, FILM COATED ORAL at 02:06

## 2022-02-11 RX ADMIN — IBUPROFEN 800 MG: 400 TABLET, FILM COATED ORAL at 03:27

## 2022-02-11 RX ADMIN — IBUPROFEN 800 MG: 400 TABLET, FILM COATED ORAL at 15:33

## 2022-02-11 RX ADMIN — IBUPROFEN 800 MG: 400 TABLET, FILM COATED ORAL at 10:10

## 2022-02-11 RX ADMIN — SIMETHICONE 80 MG: 80 TABLET, CHEWABLE ORAL at 15:43

## 2022-02-11 ASSESSMENT — ACTIVITIES OF DAILY LIVING (ADL)
ADLS_ACUITY_SCORE: 3

## 2022-02-11 NOTE — PLAN OF CARE
Vitals stable. Pt very nauseous, dizzy, lightheaded, headache this morning at about 0815. Had small emesis. Vitals stable. Blood pressure WDL. Zofran given with relief. Discussed with pt decreasing amount of oxycodone she is taking and to only take it if pain is increasing or not controlled with tylenol and ibuprofen. Pt agreeable to plan. Pt has been feeling much better as shift has gone on. Has not needed oxycodone. Reports mild headache lingering. Showered and tolerated well. New steri strips placed after shower. Breastfeeding going well.

## 2022-02-11 NOTE — PROGRESS NOTES
[facility]    Obstetrics Post-Op / Progress Note    Assessment & Plan   Assessment:  -2 Days Post-Op  Procedure(s):  PRIMARY  SECTION   Acute blood loss anemia, asymptomatic.    Doing well.  Clean wound without signs of infection.  Normal healing wound.  No immediate surgical complications identified.  No excessive bleeding  Pain well-controlled.    Plan:  Ambulation encouraged  Monitor wound for signs of infection  Pain control measures as needed  Reportable signs and symptoms dicussed with the patient  Anemia, asymptomatic. Plan iron supplement.  Anticipate discharge tomorrow    Kallie Birmingham     Interval History     No acute events overnight. Doing well this morning. Pain is well controlled. No bleeding or discharge from wound site. No bowel movements yet but is passing gas. Urinating without issue. Son is getting circumcised today. Patient and  would like to stay another night before discharge. No other concerns this morning.    Medications     dextrose 5% lactated ringers 125 mL/hr at 22 2256     - MEDICATION INSTRUCTIONS -       - MEDICATION INSTRUCTIONS -       oxytocin in 0.9% NaCl         acetaminophen  975 mg Oral Q6H     ibuprofen  800 mg Oral Q6H     loratadine  10 mg Oral Daily     senna-docusate  1 tablet Oral BID    Or     senna-docusate  2 tablet Oral BID       Physical Exam   Temp: 97.9  F (36.6  C) Temp src: Oral BP: 129/84 Pulse: 99   Resp: 16 SpO2: 98 % O2 Device: None (Room air)    Vitals:    22 0544   Weight: 64.4 kg (142 lb)     Vital Signs with Ranges  Temp:  [97.9  F (36.6  C)-98.7  F (37.1  C)] 97.9  F (36.6  C)  Pulse:  [] 99  Resp:  [16] 16  BP: (112-129)/(68-84) 129/84  SpO2:  [98 %] 98 %  I/O last 3 completed shifts:  In: -   Out: 575 [Urine:575]    General: Well appearing, alert, no distress, eating breakfast in bed  Respiratory: Regular breathing pattern  Abdomen: Soft, non-distended, non-tender.  site with butterfly  bandages is dry and intact without surrounding erythema. A couple of bandages have fallen off and several have dried blood.    Data   Recent Labs   Lab Test 02/07/22  1019 07/20/21  1012 04/30/19  0010   ABO  --   --  B   RH  --   --  Pos   AS Negative   < > Neg    < > = values in this interval not displayed.     Recent Labs   Lab Test 02/10/22  1040 02/07/22  1019   HGB 9.7* 15.5     Recent Labs   Lab Test 07/20/21  1012   RUQIGG Positive*

## 2022-02-11 NOTE — PLAN OF CARE
Vital signs stable,voiding with out difficulty,pain control with tylenol,ibuprofen&oxycodone,using abdominal binder for comfort,ambulating in room free of dizziness,abdomen distended,good bowel sounds,passing flatus,baby breast feeding well,using mother love nipple cream for tender nipple.Will continue to monitor.

## 2022-02-11 NOTE — PLAN OF CARE
Vital signs stable. Postpartum assessment WDL. Incision has old drainage on steri strips. Pain controlled with tylenol, ibuprofen, and oxycodone per MAR. Patient ambulating and voiding. Patient reports passing gas. Gas pains controlled with simethicone. Breastfeeding on cue with. Patient and infant bonding well. Will continue with current plan of care.

## 2022-02-11 NOTE — LACTATION NOTE
Routine Lactation visit with Annie, significant other Jaya & baby boy Ahmet. Annie reports feeding is going well so far. At time of visit, baby latched and feeding well in cross cradle hold at right breast, lips flanged widely with nutritive suck pattern observed.    Reviewed milk supply and engorgement. Breastfeeding section reviewed in Your Guide to Postpartum &  Care. Discussed typical  feeding patterns, cluster feeding, and ways to wake a sleepy baby for feedings. General questions answered regarding pumping, when it's helpful and necessary, general recommendation to wait to start pumping until breastfeeding is well established. Discussed introducing a bottle and recommendation to wait for bottle introduction for 3-4 weeks unless baby needs to supplement for medical reasons.    Feeding plan: Recommend unlimited, frequent breast feedings: At least 8 - 12 times every 24 hours. Avoid pacifiers and supplementation with formula unless medically indicated. Encouraged use of feeding log and to record feedings, and void/stool patterns. Annie has a pump for home use.  Encouraged to call with needs, will revisit as needed. Annie & Jaya very appreciative of visit.    Chelsi Guerrier, RN-C, IBCLC, MNN, PHN, BSN

## 2022-02-12 VITALS
HEIGHT: 60 IN | HEART RATE: 108 BPM | RESPIRATION RATE: 16 BRPM | DIASTOLIC BLOOD PRESSURE: 73 MMHG | WEIGHT: 142 LBS | OXYGEN SATURATION: 98 % | BODY MASS INDEX: 27.88 KG/M2 | SYSTOLIC BLOOD PRESSURE: 129 MMHG | TEMPERATURE: 98 F

## 2022-02-12 PROCEDURE — 250N000013 HC RX MED GY IP 250 OP 250 PS 637: Performed by: OBSTETRICS & GYNECOLOGY

## 2022-02-12 RX ORDER — IBUPROFEN 800 MG/1
800 TABLET, FILM COATED ORAL EVERY 6 HOURS PRN
Qty: 30 TABLET | Refills: 0 | Status: SHIPPED | OUTPATIENT
Start: 2022-02-12 | End: 2023-03-22

## 2022-02-12 RX ORDER — FERROUS SULFATE 325(65) MG
325 TABLET ORAL 2 TIMES DAILY
Qty: 28 TABLET | Refills: 0 | Status: SHIPPED | OUTPATIENT
Start: 2022-02-12 | End: 2023-03-22

## 2022-02-12 RX ORDER — OXYCODONE HYDROCHLORIDE 5 MG/1
5 TABLET ORAL EVERY 4 HOURS PRN
Qty: 20 TABLET | Refills: 0 | Status: SHIPPED | OUTPATIENT
Start: 2022-02-12 | End: 2022-03-14

## 2022-02-12 RX ORDER — AMOXICILLIN 250 MG
1 CAPSULE ORAL 2 TIMES DAILY PRN
Qty: 20 TABLET | Refills: 0 | Status: SHIPPED | OUTPATIENT
Start: 2022-02-12 | End: 2022-03-14

## 2022-02-12 RX ORDER — ACETAMINOPHEN 325 MG/1
650 TABLET ORAL EVERY 6 HOURS PRN
Qty: 30 TABLET | Refills: 0 | Status: SHIPPED | OUTPATIENT
Start: 2022-02-12 | End: 2022-03-14

## 2022-02-12 RX ADMIN — ACETAMINOPHEN 975 MG: 325 TABLET, FILM COATED ORAL at 08:19

## 2022-02-12 RX ADMIN — IBUPROFEN 800 MG: 400 TABLET, FILM COATED ORAL at 04:01

## 2022-02-12 RX ADMIN — OXYCODONE HYDROCHLORIDE 5 MG: 5 TABLET ORAL at 00:04

## 2022-02-12 RX ADMIN — SIMETHICONE 80 MG: 80 TABLET, CHEWABLE ORAL at 00:06

## 2022-02-12 RX ADMIN — IBUPROFEN 800 MG: 400 TABLET, FILM COATED ORAL at 09:44

## 2022-02-12 RX ADMIN — SENNOSIDES AND DOCUSATE SODIUM 2 TABLET: 50; 8.6 TABLET ORAL at 08:19

## 2022-02-12 RX ADMIN — ACETAMINOPHEN 975 MG: 325 TABLET, FILM COATED ORAL at 01:52

## 2022-02-12 ASSESSMENT — ACTIVITIES OF DAILY LIVING (ADL)
ADLS_ACUITY_SCORE: 3

## 2022-02-12 NOTE — PLAN OF CARE
Vitals stable. Up ad jimmie well. Pain controlled. Denies headache this morning. Voiding without difficulty and passing gas. Breastfeeding going well. Discharging home today with .

## 2022-02-12 NOTE — LACTATION NOTE
Follow up Lactation visit with Annie, significant other Jaya & baby boy Ahmet. Getting ready for discharge. Annie reports feeding is going well so far. She feels her milk is starting to come in this morning! Offered support and encouragement.  Discussed cluster feeding, what it is and when to expect it, The Second Night, satiety cues, feeding cues, and reviewed Feeding Log for home use.     Reviewed milk supply and engorgement. Reviewed typical timeline of milk supply initiation and progression over first 3-5 days postpartum. Discussed comfort measures for engorgement, plugged duct treatment, and warning signs of breast infection. General questions answered regarding pumping, when it's helpful and necessary, general recommendation to wait to start pumping until breastfeeding is well established. Discussed introducing a bottle and recommendation to wait for bottle introduction for 3-4 weeks unless baby needs to supplement for medical reasons.    Feeding plan: Recommend unlimited, frequent breast feedings: At least 8 - 12 times every 24 hours. Avoid pacifiers and supplementation with formula unless medically indicated. Encouraged use of feeding log and to record feedings, and void/stool patterns. Annie has a breast pump for home use. Follow up with Skyler Gomez, encouraged seeing Lactation in clinic as needed, especially since first babe was early and in NICU. Reviewed outpatient lactation resources. Annie & Jaya appreciative of visit.    Chelsi Guerrier, RN-C, IBCLC, MNN, PHN, BSN

## 2022-02-12 NOTE — PROGRESS NOTES
S: Pt doing well. Infant is being . Pain is well controlled.    O: /82   Pulse 106   Temp 98.2  F (36.8  C) (Oral)   Resp 18   Ht 1.524 m (5')   Wt 64.4 kg (142 lb)   LMP 05/10/2021   SpO2 98%   Breastfeeding Unknown   BMI 27.73 kg/m          ABD:  Uterine fundus is firm, non-tender and at the level of the umbilicus       INC: clean/dry/intact                Hemoglobin   Date Value Ref Range Status   02/10/2022 9.7 (L) 11.7 - 15.7 g/dL Final   05/02/2019 12.4 11.7 - 15.7 g/dL Final            Lab Results   Component Value Date    RH Pos 04/30/2019       A: Post-op Day #3 s/p C/Section    P: Continue Post Op Cares       Discharge home on oral iron

## 2022-02-12 NOTE — PLAN OF CARE
Vital signs stable. Postpartum assessment WDL. Incision JAIME with steri strips. Pain controlled with 5mg oxycodone PRN, tylenol, and ibuprofen. Patient ambulating independently. Patient reports passing gas, though she thinks it has slowed. Simethicone given for gas pain relief. Abd slightly rounded. No bowel movement yet. Breastfeeding on cue with no assist. Patient and infant bonding well. Will continue with current plan of care.

## 2022-02-13 ENCOUNTER — NURSE TRIAGE (OUTPATIENT)
Dept: NURSING | Facility: CLINIC | Age: 37
End: 2022-02-13
Payer: COMMERCIAL

## 2022-02-13 NOTE — TELEPHONE ENCOUNTER
"Addendum  2/13/2022    4:45 pm    Annie calling about constipation.  Used suppository today at two separate times.  Both times passed a few \"golf ball size\" hard stool.    States she felt some \"relief\".     Now wondering what else could be done.  Reviewed adding prune juice, bran cereal, apples, pears, and vegetables.   She has not tried a Fleets enema yet.  Advised to call provider on Monday with an update and to find out if there are other medications safe to use while breast feeding infant.    Verbalized understanding of directions.      Shauna Ordonez RN, [facility] Triage Nurse Advisor            Patient calling reporting she gave birth 2/9, was discharged from the hospital yesterday 2/12 and has not had a bowel movement since entering the hospital 2/9. Reports a lot of discomfort. Denies vomiting, foul smelling vaginal discharge and fever. Advised office visit in the next 24 hours with patient requesting home care instructions. Home care instructions provided with patient agreeable to trying home care measures.     Susanna Jc RN 02/13/22 9:16 AM   [facility] Triage Nurse Advisor    Reason for Disposition    Last bowel movement (BM) > 4 days ago    Additional Information    Negative: Abdomen pain    Negative: Rectal bleeding or blood in stool is main symptom    Negative: Fever > 100.4 F (38.0 C)    Negative: Patient sounds very sick or weak to the triager    Negative: [1] Vomiting AND [2] abdomen looks much more swollen than usual    Negative: [1] Vomiting AND [2] contains bile (green color)    Negative: Foul smelling vaginal discharge (i.e., lochia)    Negative: [1] Sudden onset rectal pain (straining, rectal pressure or fullness) AND [2] NOT better after SITZ bath or glycerine suppository    Protocols used: POSTPARTUM - CONSTIPATION-A-AH      "

## 2022-02-15 ENCOUNTER — NURSE TRIAGE (OUTPATIENT)
Dept: NURSING | Facility: CLINIC | Age: 37
End: 2022-02-15
Payer: COMMERCIAL

## 2022-02-15 NOTE — DISCHARGE SUMMARY
Service Date: 2022  Discharge Date: 2022    HISTORY OF PRESENT ILLNESS:  Annie is a 36-year-old  2, para 0-1-0-1, whose pregnancy was followed by myself.  Due to advanced maternal age, she had NIPT testing done, which was normal as well as a normal level 2 ultrasound, but declined her AFP.  She had a known bicornuate uterus, as well as a hymenectomy years ago.  She had PPROM and 2 episodes of  labor with her first pregnancy and ended up delivering at 31 weeks 1 day with that infant; he was only 3 pounds 5 ounces, but had a very tight fit through the introitus as well as the pelvic bones.  With this pregnancy, the patient was getting Pe Ell and with the hopes of achieving a full-term pregnancy we discussed from the beginning that if she got past the gestational age of her first and had any significant larger sized infant, my worry was that there would be william CPD and I recommended that she have a primary  section.  We discussed this several times throughout her pregnancy and the patient and her  definitely understood the reasoning and did consent to proceed with the Pe Ell.  The patient was able to maintain pregnancy until 39 weeks 2 days, at which time her primary  was scheduled.  The patient, of note, did have a bicornuate uterus and her first pregnancy was in the left horn and this pregnancy was in the right horn.    On 2022, at 39 weeks 2 days, the patient went to the operating room, where under a spinal anesthetic, she underwent a primary  section.  Unfortunately, her spinal was inadequate and there were some difficulties in controlling her pain and she did end up needing 20 mL of local anesthesia in the incision and fascia as well.  She delivered a 6 pound 2 ounce male infant from the right uterine horn.  The infant was very low, lodged in the pelvis, but because of the patient's very small stature at 4 feet 11 and the bicornuate uterus, it was  quite difficult to elevate the head out of the pelvis as there was no room superiorly, but eventually we were able to flex the infant's head and deliver him through the incision.  His weight was 6 pounds 2 ounces and Apgars were 8 and 9.  The right uterine horn was obviously with a larger horn.  There was a small left uterine horn that had 2 subserosal or transmural fibroids noted at the fundus, each about 2-3 cm.  Otherwise, there were no issues with her  section and an appropriate amount of blood loss of 530 mL.    Postoperatively, the patient did well.  Her pain was initially controlled with the Duramorph in her spinal and eventually transitioned from that and IV Toradol to oxycodone, Tylenol and oral ibuprofen.  By postoperative day #1, her Herron catheter was removed and she was able to void without issue.  She was able to ambulate and was tolerating a regular diet with positive flatus.  Her incision was clean, dry and intact and she had appropriate lochia.  She was breastfeeding and this was going as expected.  The patient did have acute blood loss anemia down to 9.7 from a preoperative hemoglobin in the 12s; however, the patient herself was asymptomatic from this.  She was very mildly tachycardic in the low 100s, but normal blood pressure.  It was felt that the patient was stable enough to just continue with oral iron and did not receive IV Venofer or any other interventions for this.  On postoperative day #3, the patient was doing well and was felt ready to be discharged to home.    DISCHARGE INSTRUCTIONS:  The patient was discharged with instructions on no lifting greater than 10-15 pounds for 6 weeks, no driving for 1-2 weeks or while on narcotics and pelvic rest for 6 weeks.  She is to follow up in 6 weeks' time for her postoperative checkup.    DISCHARGE MEDICATIONS:    1.  Oxycodone 5 mg 1-2 tabs p.o. every 4-6 hours p.r.n.  2.  Ibuprofen 800 mg p.o. every 6 hours p.r.n.    Tana Ireland,  MD        D: 02/15/2022   T: 02/15/2022   MT: KAYLEEN    Name:     BRADEN FORTUNE  MRN:      [id number]        Account:      [id number]   :      1985           Service Date: 2022                                  Discharge Date: 2022     Document: [id number]

## 2022-02-16 NOTE — TELEPHONE ENCOUNTER
Triage Call:    Patient called to report nipple cracking and bleeding.  Patient is postpartum 6 days and first time breastfeeding.  She reports she didn't have a consult with lactation consultant in the hospital.  She reports left nipple is worse than the right nipple.  She has been applying nipple cream.  Patient has a nipple shield and questioning if she should use it to feed.      According to the protocol, patient should schedule an appointment with a lactation consultant.  Advised patient to use nipple shield on right side to feed and use pump on lowest setting on left side.  If pumping is tolerable, advised patient to try to feed baby on right and then switch to left side with nipple shield for less than 10 minutes. Advised patient to call back if fevers, engorgement, baby not feeding well, or any additional questions. Patient verbalizes understanding and agrees with plan of care.  Provided patient with lactation consultant phone number.    Dena May RN  02/15/22 9:47 PM  [facility] Nurse Advisor    COVID 19 Nurse Triage Plan/Patient Instructions    Please be aware that novel coronavirus (COVID-19) may be circulating in the community. If you develop symptoms such as fever, cough, or SOB or if you have concerns about the presence of another infection including coronavirus (COVID-19), please contact your health care provider or visit [email or web address].     Disposition/Instructions    In-Person Visit with provider recommended. Reference Visit Selection Guide.    Thank you for taking steps to prevent the spread of this virus.  o Limit your contact with others.  o Wear a simple mask to cover your cough.  o Wash your hands well and often.    Resources    [facility]: About COVID-19: [email or web address]    CDC: What to Do If You're Sick: www.cdc.gov/coronavirus/2019-ncov/about/steps-when-sick.html    CDC: Ending Home Isolation:  www.cdc.gov/coronavirus/2019-ncov/hcp/disposition-in-home-patients.html     CDC: Caring for Someone: www.cdc.gov/coronavirus/2019-ncov/if-you-are-sick/care-for-someone.html     [facility]: Interim Guidance for Hospital Discharge to Home: www.health.[facility].[place]/diseases/coronavirus/hcp/hospdischarge.pdf    [facility] clinical trials (COVID-19 research studies): [email or web address]/umn-[email or web address]-trials     Below are the COVID-19 hotlines at the [place] Department of Health ([facility]). Interpreters are available.   o For health questions: Call 797-203-3277 or 1-833.846.2877 (7 a.m. to 7 p.m.)  o For questions about schools and childcare: Call 556-774-6205 or 1-362.535.7900 (7 a.m. to 7 p.m.)     Reason for Disposition    Breastfeeding questions about mother (breast symptoms or feeling sick)    [1] Breastfeeding is painful > 24 hours AND [2] care advice per guideline not helping    Sore or cracked nipples, questions about    Additional Information    Negative: [1] Mom has sore nipples AND [2] baby has untreated symptoms of thrush    Negative: [1] Breastfeeding AND [2] has feeding concerns about the baby    Negative: [1] Breastfeeding AND [2] has questions about maternal medicines, other drugs or diet    Negative: Postpartum depression is the main concern    Negative:  breast symptoms    Negative: Weaning from breastfeeding, questions about    Negative: [1] SEVERE breast pain AND [2] fever > 103 F (39.4 C)    Negative: Mother sounds very sick or weak to the triager    Negative: [1] Breast looks infected (spreading redness, feels hot to touch) AND [2] large red area (> 2 in. or 5 cm)    Negative: [1] Breast pain or lump AND [2] mother has fever > 100.4 F (38.0 C)    Negative: [1] Breast pain or lump AND [2] mother has chills or feeling overall ill    Negative: [1] SEVERE pain AND [2] not improved 2 hours after pain medicine and care advice    Negative: [1] Breast looks infected (area of redness) AND [2] no  fever    Negative: Blisters on nipple or areola    Negative: [1] Sore nipples AND [2] baby has been treated for thrush    Negative: [1] Sore nipples present > 24 hours AND [2] care advice per guideline not helping    Negative: [1] Painful lump present > 24 hours AND [2] care advice per guideline not helping    Negative: [1] Engorgement present > 24 hours AND [2] care advice per guideline not helping    Negative: Other maternal breast symptom that triager feels needs evaluation    Protocols used: POSTPARTUM - BREAST PAIN AND [id number], BREASTFEEDING - MOTHER'S BREAST SYMPTOMS OR [id number]

## 2022-02-18 ENCOUNTER — TELEPHONE (OUTPATIENT)
Dept: OBGYN | Facility: CLINIC | Age: 37
End: 2022-02-18
Payer: COMMERCIAL

## 2022-02-18 DIAGNOSIS — F41.9 ANXIETY: Primary | ICD-10-CM

## 2022-02-18 RX ORDER — ESCITALOPRAM OXALATE 5 MG/1
5 TABLET ORAL DAILY
Qty: 30 TABLET | Refills: 0 | Status: SHIPPED | OUTPATIENT
Start: 2022-02-18 | End: 2022-03-14

## 2022-02-18 NOTE — TELEPHONE ENCOUNTER
Delivered 22:  Pt calling with concerns of postpartum depression    Pt encouraged to call the clinic from her mother.  Never had experienced this with her last pregnancy/delivery.    Pt reports have many crying spells, increased anxiety. Patient is not feeling like herself.   Patient feels trapped in a body that is not hers.  Described difficulty with anxiety while on bedrest this pregnancy, C/S with this delivery vs vaginal delivery.  Toddler transitioning as well which is adding additional stressors.     Denies any thoughts of harming herself and/or others. Feels safe at this time.   returning back to work next week.    ROBERT-7: 4  PHQ-9: 2    Consulted with Dr Ireland, sounds like Baby Blues  OK to start 5mg Lexapro at this time due to ongoing anxiety through out her pregnancy.     Pt gladly accepting medication at this time and would like to start the lexapro. Discussed daily use, takes a full 30-days to reach max effectiveness. 30-day supply sent, she will follow-up with Dr Ireland, in one month. Pt sent to scheduling.  Encouraged to take this weekend for herself.  Rest as much as possible.     Pt verbalized understanding, in agreement with plan, and voiced no further questions.  Pt to call with any worsening of symptoms and/or thoughts of harming herself or her children.  John Nava RN on 2022 at 3:12 PM

## 2022-02-19 NOTE — PROGRESS NOTES
Tons of BH with any standing or movement but still do resolve with laying down and rest  Good FM still  Really uncomfortable and no more room at all  Extensive discussion on the c/s the flow of the morning, the recovery and pain control, expectations for recovery and all the differences with full term and c/s vs  in NICU for weeks but vaginal delivery  Discussed salpingectomy and they are quite sure they're done but not enough to do that permanent of intervention  covid swab and preop labs done today in prep for c/s in 2 days

## 2022-02-21 ENCOUNTER — TELEPHONE (OUTPATIENT)
Dept: OBGYN | Facility: CLINIC | Age: 37
End: 2022-02-21
Payer: COMMERCIAL

## 2022-02-21 NOTE — TELEPHONE ENCOUNTER
Annie is postpartum C/S 2/9/22   Had questions regarding Lexapro and nursing. Discussed with patient that ok to nurse and take medication.   Currently has no problems nursing and is also pumping as well. No concerns.    Discussed removing steri-strips from C/S site, using a warm cloth to help moisten strips and to assist with loosening strips.     Also discussed tylenol/ibuprofen use at home. Minimizing leaning over hunched over and putting extra pressure on incsision. Discussed ways to help with any discomfort can use ice pack at site.     Tabitha Campbell RN

## 2022-03-11 NOTE — PROGRESS NOTES
Annie Clark is a 36 year old female who is being evaluated via a billable telephone visit.      What phone number would you like to be contacted at? [phone]  How would you like to obtain your AVS? Jonathan      SUBJECTIVE:                                                   Annie Clark is a 36 year old female who presents for virtual visit today for the following health issue(s):  Patient presents with:  Recheck Medication: F/u to Lexapro 5 mg. Anxiety part still there. Emotional part is getting better. Has noticed a little trouble sleeping.      HPI:  Has always had anxiety her whole life and Jaya is always calling her a worry wort but just felt like after having the baby it was dramatically worse  C/s recovery was much harder physically and bringing a full term baby home from the [facility] was all she wanted but then breastfeeding working and having to do it and having the baby at home w/o help from day 1 and Harsha was too much for her    Called about 10 days PP and said she was crying constantly, couldn't sleep, worrying incessantly, overwhelmed and feeling unable to manage. Denied any suicidal ideation or any hallucinations or any thoughts of harming self or family    Felt it was likely PP blues and hormones given day 10 but decided to start her on lexapro. Started on 5mg but unsure if by patient request or via RN rec as this was managed by jonathan and RN  On 5mg for about one month now. Maybe felt a little more tired at first but not really sure if med or just  and sleep deprivation. All of the incessant crying and mood swings have resolved. Anxiety is still present and overworrying and ruminating and worrying about irrational things and knows it but can't stop. Does feel it's definitely better on the lexapro but not gone. Depression is much less of an issue currently. No other s.e from the meds.    Patient's last menstrual period was 05/10/2021..     Patient is sexually active, .  Using none  and not sexually active for contraception.    reports that she has never smoked. She has never used smokeless tobacco.      Health maintenance updated:  yes    Today's PHQ-2 Score:   PHQ-2 (  [employer]) 2018   Q1: Little interest or pleasure in doing things 0   Q2: Feeling down, depressed or hopeless 0   PHQ-2 Score 0   PHQ-2 Total Score (12-17 Years)- Positive if 3 or more points; Administer PHQ-A if positive 0   Q1: Little interest or pleasure in doing things Not at all   Q2: Feeling down, depressed or hopeless Not at all   PHQ-2 Score 0     Today's PHQ-9 Score:   PHQ-9 SCORE 3/14/2022   PHQ-9 Total Score MyChart 2 (Minimal depression)   PHQ-9 Total Score 2     Today's ROBERT-7 Score:   ROBERT-7 SCORE 3/14/2022   Total Score 1 (minimal anxiety)   Total Score 1       Problem list and histories reviewed & adjusted, as indicated.  Additional history: as documented.    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     Dysplasia of cervix, low grade (ANH 1)     Bicornuate uterus affecting pregnancy, antepartum     Labor and delivery, indication for care     Supervision of high-risk pregnancy of elderly primigravida     Postpartum care and examination of lactating mother     High risk pregnancy due to history of  labor in third trimester     Bicornuate uterus affecting pregnancy in third trimester, antepartum     High-risk pregnancy, elderly multigravida in third trimester     Anemia due to blood loss, acute     Past Surgical History:   Procedure Laterality Date     C TRANSNASAL EUSTACH TUBE INFLATE,CATH        SECTION N/A 2022    Procedure: PRIMARY  SECTION;  Surgeon: Tana Ireland MD;  Location:  L+D     MANDIBLE SURGERY      lower and upper jaw surgery     PARTIAL HYMENECTOMY/REVISION HYMENAL RING  2008    Done by [facility]     TONSILLECTOMY        Social  History     Tobacco Use     Smoking status: Never Smoker     Smokeless tobacco: Never Used   Substance Use Topics     Alcohol use: No     Alcohol/week: 0.0 standard drinks      Problem (# of Occurrences) Relation (Name,Age of Onset)    Cancer (1) Paternal Grandmother: had uterus removed at age 30 but unsure if it was uterine cancer or something else    Diabetes (1) Maternal Grandfather    Hyperlipidemia (2) Father, Maternal Grandmother    Osteoporosis (2) Maternal Grandmother, Mother    Prostate Cancer (1) Paternal Grandfather:  from it in his 60s    Testicular cancer (2) Father (63): pt thought this was prostate cancer but then brother had it and realized it was testicular. dad is BRIP-1 carrier, Brother (35): no genetic testing    Thyroid Disease (2) Maternal Grandmother, Mother            Current Outpatient Medications   Medication Sig     escitalopram (LEXAPRO) 10 MG tablet Take 1 tablet (10 mg) by mouth daily     ferrous sulfate (FEROSUL) 325 (65 Fe) MG tablet Take 1 tablet (325 mg) by mouth 2 times daily     ibuprofen (ADVIL/MOTRIN) 800 MG tablet Take 1 tablet (800 mg) by mouth every 6 hours as needed for moderate pain     polyethylene glycol (MIRALAX) 17 g packet Take 1 packet by mouth daily Takes 1/3 cup      Prenatal Vit-Fe Fumarate-FA (PRENATAL 1+1 OR) Take 1 tablet by mouth daily      No current facility-administered medications for this visit.     Allergies   Allergen Reactions     Nuts Anaphylaxis and Nausea and Vomiting     Peas Nausea and Vomiting and Swelling     Thimerosal Other (See Comments) and Swelling     Dry skin, redness     Lanolin Rash         OBJECTIVE:     No vitals were obtained today due to virtual visit.    Physical Exam  .  PSYCH: Mentation appears normal, affect normal/bright, judgement and insight intact, normal speech          ASSESSMENT/PLAN:                                                      Phone call duration: 25 minutes      ICD-10-CM    1. Anxiety  F41.9 escitalopram  (LEXAPRO) 10 MG tablet         Some of her crying and moodswings have resolved though that could have been hormone related from PP blues and may have resolved itself anyway  Anxiety is a bit better though still not fully under control  Isn't sure why started on lower dose but b/c she's a smaller person and at times has been more sensitive to meds    Discussed waiting and giving it 1-2 more weeks to see full maximum benefit of 5mg or going up to 10mg which is the typical recommended dose now that knows she's tolerating if fine and w/o side effects  Patient would like to bump up to 10mg now and will double up on the 8 tabs she has left but new Rx was sent for 10mg tabs so would only take one at a atime.  Has her routine PP f/u appointment in one week. Likely too soon to see too much of the impact of bumping to 10mg at only one week but hopefully would at least be seeing some steady improvements by that point  If any intolerable side effects will just scale back to 5mg until sees me next week        Tana Ireland MD  [facility] FOR [facility] [place]

## 2022-03-14 ENCOUNTER — VIRTUAL VISIT (OUTPATIENT)
Dept: OBGYN | Facility: CLINIC | Age: 37
End: 2022-03-14
Payer: COMMERCIAL

## 2022-03-14 DIAGNOSIS — F41.9 ANXIETY: Primary | ICD-10-CM

## 2022-03-14 PROCEDURE — 99214 OFFICE O/P EST MOD 30 MIN: CPT | Mod: 95 | Performed by: OBSTETRICS & GYNECOLOGY

## 2022-03-14 RX ORDER — ESCITALOPRAM OXALATE 10 MG/1
10 TABLET ORAL DAILY
Qty: 90 TABLET | Refills: 0 | Status: SHIPPED | OUTPATIENT
Start: 2022-03-14 | End: 2022-03-21

## 2022-03-21 ENCOUNTER — PRENATAL OFFICE VISIT (OUTPATIENT)
Dept: OBGYN | Facility: CLINIC | Age: 37
End: 2022-03-21
Payer: COMMERCIAL

## 2022-03-21 VITALS
SYSTOLIC BLOOD PRESSURE: 102 MMHG | BODY MASS INDEX: 24.54 KG/M2 | DIASTOLIC BLOOD PRESSURE: 66 MMHG | WEIGHT: 125 LBS | HEIGHT: 60 IN

## 2022-03-21 DIAGNOSIS — Z30.011 OCP (ORAL CONTRACEPTIVE PILLS) INITIATION: ICD-10-CM

## 2022-03-21 DIAGNOSIS — F41.9 ANXIETY: ICD-10-CM

## 2022-03-21 PROBLEM — Q51.3 BICORNUATE UTERUS AFFECTING PREGNANCY IN THIRD TRIMESTER, ANTEPARTUM: Status: RESOLVED | Noted: 2022-02-09 | Resolved: 2022-03-21

## 2022-03-21 PROBLEM — O09.523 HIGH-RISK PREGNANCY, ELDERLY MULTIGRAVIDA IN THIRD TRIMESTER: Status: RESOLVED | Noted: 2022-02-09 | Resolved: 2022-03-21

## 2022-03-21 PROBLEM — O34.03 BICORNUATE UTERUS AFFECTING PREGNANCY IN THIRD TRIMESTER, ANTEPARTUM: Status: RESOLVED | Noted: 2022-02-09 | Resolved: 2022-03-21

## 2022-03-21 PROBLEM — O34.00 BICORNUATE UTERUS AFFECTING PREGNANCY, ANTEPARTUM: Status: RESOLVED | Noted: 2019-04-06 | Resolved: 2022-03-21

## 2022-03-21 PROBLEM — O09.519 SUPERVISION OF HIGH-RISK PREGNANCY OF ELDERLY PRIMIGRAVIDA: Status: RESOLVED | Noted: 2021-11-22 | Resolved: 2022-03-21

## 2022-03-21 PROBLEM — O09.213 HIGH RISK PREGNANCY DUE TO HISTORY OF PRETERM LABOR IN THIRD TRIMESTER: Status: RESOLVED | Noted: 2022-02-09 | Resolved: 2022-03-21

## 2022-03-21 PROBLEM — Q51.3 BICORNUATE UTERUS AFFECTING PREGNANCY, ANTEPARTUM: Status: RESOLVED | Noted: 2019-04-06 | Resolved: 2022-03-21

## 2022-03-21 PROCEDURE — 87624 HPV HI-RISK TYP POOLED RSLT: CPT | Performed by: OBSTETRICS & GYNECOLOGY

## 2022-03-21 PROCEDURE — G0145 SCR C/V CYTO,THINLAYER,RESCR: HCPCS | Performed by: OBSTETRICS & GYNECOLOGY

## 2022-03-21 PROCEDURE — 99207 PR POST PARTUM EXAM: CPT | Performed by: OBSTETRICS & GYNECOLOGY

## 2022-03-21 RX ORDER — ACETAMINOPHEN AND CODEINE PHOSPHATE 120; 12 MG/5ML; MG/5ML
0.35 SOLUTION ORAL DAILY
Qty: 84 TABLET | Refills: 3 | Status: SHIPPED | OUTPATIENT
Start: 2022-03-21 | End: 2023-01-31

## 2022-03-21 RX ORDER — ESCITALOPRAM OXALATE 10 MG/1
10 TABLET ORAL DAILY
Qty: 90 TABLET | Refills: 3 | Status: SHIPPED | OUTPATIENT
Start: 2022-03-21 | End: 2023-03-22

## 2022-03-21 ASSESSMENT — ANXIETY QUESTIONNAIRES
6. BECOMING EASILY ANNOYED OR IRRITABLE: SEVERAL DAYS
2. NOT BEING ABLE TO STOP OR CONTROL WORRYING: NOT AT ALL
1. FEELING NERVOUS, ANXIOUS, OR ON EDGE: NOT AT ALL
3. WORRYING TOO MUCH ABOUT DIFFERENT THINGS: NOT AT ALL
5. BEING SO RESTLESS THAT IT IS HARD TO SIT STILL: NOT AT ALL
GAD7 TOTAL SCORE: 1
IF YOU CHECKED OFF ANY PROBLEMS ON THIS QUESTIONNAIRE, HOW DIFFICULT HAVE THESE PROBLEMS MADE IT FOR YOU TO DO YOUR WORK, TAKE CARE OF THINGS AT HOME, OR GET ALONG WITH OTHER PEOPLE: NOT DIFFICULT AT ALL
7. FEELING AFRAID AS IF SOMETHING AWFUL MIGHT HAPPEN: NOT AT ALL

## 2022-03-21 ASSESSMENT — PATIENT HEALTH QUESTIONNAIRE - PHQ9
SUM OF ALL RESPONSES TO PHQ QUESTIONS 1-9: 1
5. POOR APPETITE OR OVEREATING: NOT AT ALL

## 2022-03-21 NOTE — PROGRESS NOTES
SUBJECTIVE:  Annie Clark,  is here for a postpartum visit.  She had a  Section  on 2022 delivering a healthy baby boy, named Ahmet weighing 6 lbs 2.4 oz at term.      HPI:  Patient is here for her routine PP visit but did have a phone visit last week d/t anxiety she was having and needing to start SSRIs  Patient was initially started on 5mg lexapro b/c crying uncontrollable and anxious about everything whether rational or not and not just how to juggle everything.  This definitely helped at least the crying, though meds started within the 2 week period of baby blues as well so may have improved just from that alone. But anxiety still high. Ended up bumping up to 10mg just over a week or so ago and does feel like she can tell some improvement.  Now just feels like there's a little anxiety but it's all related to balancing all the needs of two kids and not sleeping great so being more irritable, short, etc.  It's not keeping her from sleeping, no crying or feeling hopeless, can see that it's a normal amount of anxiety that any mom of two would have.  Also has not left her house other than 1-2 peds visits with the baby and hasn't driven herself or been by herself until today  Just driving alone and coming here alone and she feels even a bit better just doing that as didn't realize how being stuck in her house for weeks has impacted her  Wondering how long she would be on lexapro and if has to wean or can stay on long term    Lochia is done. Mostly sure that done having kids but with bicornuate uterus wouldn't be IUD candidate. charlene likely will get a vas but not sure that 100% ready for him to do it yet.  Is ok with doing ocps if that is best option.    Pumping and breastfeeding both. Didn't do that with tanisha rockwell/c of him being premature and trying to pump but not enough supply, etc  Now feeling like has some vaginal discomfort. Not a dryness or itching but when lays down feels like her pelvic  floor is spasming/tightening. Not painful just a strange sensation and tends to resolve on it's own within a half hour at most.     Questions about her fibroid that was found intraop and about incision healing, abdominal binder, exercise  Last PHQ-9 score on record=   PHQ-9 SCORE 3/21/2022   PHQ-9 Total Score MyChart -   PHQ-9 Total Score 1     ROBERT-7 SCORE 2/18/2022 3/14/2022 3/21/2022   Total Score 4 (minimal anxiety) 1 (minimal anxiety) -   Total Score 4 1 1       Pap:   Lab Results   Component Value Date    PAP NIL, HPV other and 16 positive 10/07/2020    PAP NIL 06/12/2019    PAP NIL 10/30/2015       Delivery complications: No  Breast feeding:  Yes, is breastfeeding and pumping. Going well  Bladder problems:  No  Bowel problems/hemorrhoids:  No  Episiotomy/laceration/incision healed? Yes: feels healed well  Vaginal flow:  None  Pioneer:  No  Contraception: oral contraceptives  Emotional adjustment:  doing well, happy and having some difficulties adjusting with anxiety  Back to work:  Next fall    12 point review of systems negative other than symptoms noted below or in the HPI.    OBJECTIVE:  Vitals: /66   Ht 1.524 m (5')   Wt 56.7 kg (125 lb)   LMP 05/10/2021   Breastfeeding Yes   BMI 24.41 kg/m    BMI= Body mass index is 24.41 kg/m .  General - pleasant female in no acute distress.  Breast -  deferred  Abdomen - Incision well-healed  Pelvic - EG: normal adult female, BUS: within normal limits, Vagina: well rugated, no discharge, Cervix: no lesions or CMT, Uterus: firm, normal sized and nontender, midplane in position. Adnexae: no masses or tenderness.  Rectovaginal - deferred.    ASSESSMENT:    ICD-10-CM    1. Routine postpartum follow-up  Z39.2 Pap thin layer screen with HPV - recommended age 30 - 65 years     HPV Hold (Lab Only)   2. Anxiety  F41.9 escitalopram (LEXAPRO) 10 MG tablet   3. OCP (oral contraceptive pills) initiation  Z30.011 norethindrone (MICRONOR) 0.35 MG tablet        PLAN:  May resume normal activities without restrictions.  Pap smear was done today.    Discussed anxiety, depression, PP vs just baseline that is worsened by the pressures/stresses of a new baby and sleep issues etc.  Doing well on lexapro and no side effects and can be on it long term her whole life or can at any point discuss weaning and seeing how she does without it. However no long term side effects to worry about continuing on it if helpful   Refills on the lexapro 10mg sent in for the year    Discussed contraception and will do minipill for now and either call when done nursing to switch to CARLIE or charlene can do vas and can stop minipill once he has all clear  rx sent on [place] and will contact us when/if switch is indicated    No further restrcitions and can exercise and drive and be s.a  Will recommend silicone lubricant for sex b/c some of her vaginal issues are likely low e2 but the spasming of pelvic floor is likely just muscle stretch going to full term and carrying low and then the recovery from surgery and just some warm baths, heating pads, rest and time and that should resolve  If doesn't or worsens or becomes an issue for sex, then would refer to pelvic floor phys therapy but on exam has no tightening of pelvic floor or localized discomfort or tension noted    Full counseling was provided, and all questions were answered.   Return to clinic in one year for an annual visit.     Tana Ireland MD

## 2022-03-22 ASSESSMENT — ANXIETY QUESTIONNAIRES: GAD7 TOTAL SCORE: 1

## 2022-03-24 LAB
BKR LAB AP GYN ADEQUACY: NORMAL
BKR LAB AP GYN INTERPRETATION: NORMAL
BKR LAB AP HPV REFLEX: NORMAL
BKR LAB AP PREVIOUS ABNL DX: NORMAL
BKR LAB AP PREVIOUS ABNORMAL: NORMAL
PATH REPORT.COMMENTS IMP SPEC: NORMAL
PATH REPORT.COMMENTS IMP SPEC: NORMAL
PATH REPORT.RELEVANT HX SPEC: NORMAL

## 2022-03-25 LAB
HUMAN PAPILLOMA VIRUS 16 DNA: NEGATIVE
HUMAN PAPILLOMA VIRUS 18 DNA: NEGATIVE
HUMAN PAPILLOMA VIRUS FINAL DIAGNOSIS: NORMAL
HUMAN PAPILLOMA VIRUS OTHER HR: NEGATIVE

## 2022-10-28 ENCOUNTER — TELEPHONE (OUTPATIENT)
Dept: OBGYN | Facility: CLINIC | Age: 37
End: 2022-10-28

## 2022-10-28 NOTE — TELEPHONE ENCOUNTER
Patient states period came back in August. Was very light in nature.    No period since until 10/8/22 and lasted 10 days. Was heavier in nature.  Now a week later she is starting to bleed again.   Has not taken missed any pills.  Patient states she takes it every morning but not necessarily the same time every day.    norethindrone (MICRONOR) 0.35 MG tablet    Encouraged patient to set an alarm on her phone to take the medication at the same time everyday.  Especially with child starting to wean a bit from breastfeeding as baby start taking more solids.    Discussed normal irregular cycles in the first postpartum and also while breastfeeding.    Pt verbalized understanding, in agreement with plan, and voiced no further questions.  Routing pt [employer] message to provider to advise if any further recommendations.    John Nava RN on 10/28/2022 at 4:32 PM

## 2022-10-28 NOTE — TELEPHONE ENCOUNTER
"Minipills of progesterone only are very commonly not good at cycle control once she gets further out from having the baby and breastfeeding inherently decreases.  These \"periods\" aren't always true periods and so irregular bleeding is very common.  If it's very heavy or very frequent then sometimes we have to either switch to CARLIE's which the E2 may decrease milk supply or just stop the POP and use condoms, switch to mirena or just bear with it until done nursing and E2 is ok to take again  "

## 2022-10-28 NOTE — TELEPHONE ENCOUNTER
Patient had a baby 8 months ago. She is breast feeding and her menses just started. She thinks that is not normal. Please call pt back today to advise

## 2022-10-31 ENCOUNTER — MYC MEDICAL ADVICE (OUTPATIENT)
Dept: OBGYN | Facility: CLINIC | Age: 37
End: 2022-10-31

## 2022-10-31 NOTE — TELEPHONE ENCOUNTER
Annie called clinic back to discuss. 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 Helpful with breastfeeding. Wishes to continue until Ahmet is 1.  had a vasectomy a few months ago and just went in to submit sample to see if he is cleared.     Annie will stay on minipill for now until they received results back from husbands test. Will go off if he is all cleared. And just use condoms. Will decide in future once she is done breastfeeding if will go back on CARLIE's to help with cycles/cramps or if wants to stay off completely.     Routing as FYI only to Dr.Jeffers Tabitha Campbell, RN

## 2022-10-31 NOTE — TELEPHONE ENCOUNTER
Left message for patient. Let her know I woulkd send mychart with  recommendations.  Can call clinic back to discuss or send mychart back.    Tabitha Campbell RN

## 2023-01-31 ENCOUNTER — TELEPHONE (OUTPATIENT)
Dept: OBGYN | Facility: CLINIC | Age: 38
End: 2023-01-31
Payer: COMMERCIAL

## 2023-01-31 DIAGNOSIS — Z30.011 OCP (ORAL CONTRACEPTIVE PILLS) INITIATION: ICD-10-CM

## 2023-01-31 RX ORDER — ACETAMINOPHEN AND CODEINE PHOSPHATE 120; 12 MG/5ML; MG/5ML
0.35 SOLUTION ORAL DAILY
Qty: 84 TABLET | Refills: 0 | Status: SHIPPED | OUTPATIENT
Start: 2023-01-31 | End: 2023-03-22

## 2023-01-31 RX ORDER — ACETAMINOPHEN AND CODEINE PHOSPHATE 120; 12 MG/5ML; MG/5ML
0.35 SOLUTION ORAL DAILY
Qty: 84 TABLET | Refills: 0 | Status: SHIPPED | OUTPATIENT
Start: 2023-01-31 | End: 2023-01-31

## 2023-01-31 NOTE — TELEPHONE ENCOUNTER
Requested Prescriptions   Pending Prescriptions Disp Refills     norethindrone (MICRONOR) 0.35 MG tablet 84 tablet 0     Sig: Take 1 tablet (0.35 mg) by mouth daily       There is no refill protocol information for this order        Last Written Prescription Date:  3/21/22  Last Fill Quantity: 84,  # refills: 3   Last office visit: Visit date not found with prescribing provider:  Shu   Future Office Visit:   Next 5 appointments (look out 90 days)    Mar 22, 2023 10:00 AM  PHYSICAL with Tana Ireland MD  [facility] ([facility] ) [address] [address]  [phone]         Medication is being filled for 1 time refill only due to:  Patient needs to be seen because it has been more than one year since last visit.  John Nava RN on 1/31/2023 at 10:10 AM

## 2023-01-31 NOTE — TELEPHONE ENCOUNTER
Reason for call:  Medication   If this is a refill request, has the caller requested the refill from the pharmacy already? No  Will the patient be using a [facility]? No  Name of the pharmacy and phone number for the current request:   [address] [place] [id number]  Phone: [phone]    Name of the medication requested: norethindrone (MICRONOR) 0.35 MG tablet    Other request: want to change to the one she had before this birth control. Has her annual scheduled on 3/22/2023    Phone number to reach patient:  Cell number on file:    Telephone Information:   Mobile [phone]       Best Time: call in the next hour if possible, if not can call any time    Can we leave a detailed message on this number?  YES    Travel screening: Not Applicable

## 2023-01-31 NOTE — TELEPHONE ENCOUNTER
LMTCB  Crystal De Guzman RN on 10/9/2019 at 8:08 AM    
Patient returned call, please call back.  
Patient returning call.   
Pt would like a call back regarding the rx that she received . She would like to have the birth control she had previously . Please call back with any questions .   
Reviewed pt's bcp- it is the same as prior to her pregnancy.  No further questions.  
Tried calling the pt back- LMTCB  
Speaking Coherently

## 2023-03-20 NOTE — PROGRESS NOTES
Annie is a 37 year old  female who presents for annual exam.     Besides routine health maintenance,  she would like to discuss abnormal bleeding . fibroid    HPI:  The patient's PCP is Dr. Tana Ireland MD.  Not fasting     Pt presents for an annual physical exam.     Stopped breast feeding after 11 months so essentially done for about 3 or so.  Was on minipill while she was breastfeeding.   Called the nurse line to switch from the mini pill to the one she was originally on and has been on the new pill for about a month. Had some issues with pharmacy change and insurance so took awhile so was off for a bit and now back on the pill which does look different than the one she was on while breastfeeding, however her med list shows that her most recent ocp refill sent by RN was in fact still the minipill and not junel  that she had been on before. The junel worked great for years and no s.e or issues and had normal cycle control.    The last month or two she has had irregular bleeding and has almost essentially been bleeding or spotting for nearly 3 weeks.  Doesn't take it as consistent as she'd like d/t kids and house duties etc. But periods had been irregular during the switch.    got a vas and also had his f.u s.a and was all clear. Wondering about maybe just stopping ocps all together as wants to just see what her body does.    However due to all of the irregular bleeding she was starting to be anxious about what it could be. Mother and grandmother had fibroids and both needed a hyst. her Mother was anemic and there was even concern for cancer but then had a hyst and just a large but benign fibroid found. Started to think that she may have one too.   Also has just generally been more bloated and distended recently.     Has been seeing a chiro for torso pain that was radiating down her right side, which is the side of her bicornuate that she carried oren on so wasn't sure if related. since going to  him the pain is actually much better. Also were doing some deep tissue massage of her abdomen to help with scar tissue and her bloating. Told to do topical castor oil as it could help. Figured it wasn't going to help but thought she'd ask if safe. Does feel like the abdominal massage may be helping some with her bloating/digestion though.     Has been fairly sedentary since having the baby. Also ended up quitting her job so now is staying at home so more sedentary with that as well. Just starting to get back to regular exercise and focusing on her overall health. Now that her right back/pelvic/leg pain are improved would like to get a regular exercise regimen going.    Doing well on her lexapro. Has helped with not being irritable so quickly or stressing out as much about the normal triggers with little kids, etc.  However also has baseline anxiety in general and frequently will focus it on health and worrying about worst case scenarios of health related things or being ok for the kids, etc. Feels that overall it helps and wondering how she'd know if it needed an increased dose or if could even wean off at some point. However overall outside of the recent DUB, she has felt that her anxiety and irritability and patience are all improved on it.     GYNECOLOGIC HISTORY:    Patient's last menstrual period was 03/01/2023 (approximate).    Regular menses? no    Length of menses: 3 weeks    Her current contraception method is: oral contraceptives.  She  reports that she has never smoked. She has never used smokeless tobacco.    Patient is sexually active.  STD testing offered?  Declined  Last PHQ-9 score on record =   PHQ-9 SCORE 3/22/2023   PHQ-9 Total Score MyChart -   PHQ-9 Total Score 0     Last GAD7 score on record =   ROBERT-7 SCORE 3/22/2023   Total Score -   Total Score 0     Alcohol Score = 2    HEALTH MAINTENANCE:  Cholesterol: (  Cholesterol   Date Value Ref Range Status   03/22/2023 183 <200 mg/dL Final     "NA  Last Mammo: Not applicable, Result: Not applicable, Next Mammo: Due at age 41   Pap:  Lab Results   Component Value Date    GYNINTERP  2022     Negative for Intraepithelial Lesion or Malignancy (NILM), HPV NEG    PAP NIL, HPV + 10/07/2020    PAP NIL, HPV + 2019    PAP NIL 10/30/2015   2020 COLP  Colonoscopy:  NA, Result: Not applicable, Next Colonoscopy: 46 years.  Dexa:  NA    Health maintenance updated:  Yes     HISTORY:  OB History    Para Term  AB Living   2 2 1 1 0 2   SAB IAB Ectopic Multiple Live Births   0 0 0 0 2      # Outcome Date GA Lbr Leeroy/2nd Weight Sex Delivery Anes PTL Lv   2 Term 22 39w2d  2.79 kg (6 lb 2.4 oz) M CS-LTranv   BINA      Birth Comments: followed and delivered by Ghada. planned primary c/s due to near CPD of her 31 weeker. ariel, spinal inadequate      Name: Ahmet      Apgar1: 8  Apgar5: 9   1  19 31w1d 04:00 / 00:22 1.5 kg (3 lb 4.9 oz) M Vag-Spont Local Y BINA      Birth Comments: FOLLOWED AND delivered by ghada. PTL admit x2 at 27 weeks and then at 30 weeks. then PPROM 30+6 and kicked in to labor at 31+1. 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     Dysplasia of cervix, low grade (ANH 1)     Anemia due to blood loss, acute     Anxiety     Contact dermatitis, unspecified contact dermatitis type, unspecified trigger     Bicornuate uterus     Past Surgical History:   Procedure Laterality Date     C TRANSNASAL EUSTACH TUBE INFLATE,CATH        SECTION N/A 2022    Procedure: PRIMARY  SECTION;  Surgeon: Tana Ireland MD;  Location:  [facility]     MANDIBLE SURGERY      lower and upper jaw surgery     PARTIAL HYMENECTOMY/REVISION HYMENAL RING  2008    Done by Himanshu " "    TONSILLECTOMY        Social History     Tobacco Use     Smoking status: Never     Smokeless tobacco: Never   Substance Use Topics     Alcohol use: No     Alcohol/week: 0.0 standard drinks      Problem (# of Occurrences) Relation (Name,Age of Onset)    Cancer (1) Paternal Grandmother: had uterus removed at age 30 but unsure if it was uterine cancer or something else    Diabetes (1) Maternal Grandfather    Osteoporosis (2) Maternal Grandmother, Mother    Thyroid Disease (2) Maternal Grandmother, Mother    Prostate Cancer (1) Paternal Grandfather:  from it in his 60s    Testicular cancer (2) Father (63): pt thought this was prostate cancer but then brother had it and realized it was testicular. dad is BRIP-1 carrier, Brother (35): no genetic testing    Hyperlipidemia (2) Father, Maternal Grandmother            Current Outpatient Medications   Medication Sig     desonide (DESOWEN) 0.05 % external ointment APPLY THE SMALLEST AMOUNT THAT WILL COVER AFFECTED AREA TWICE DAILY     escitalopram (LEXAPRO) 10 MG tablet Take 1 tablet (10 mg) by mouth daily     loratadine 10 MG capsule      Prenatal Vit-Fe Fumarate-FA (PRENATAL 1+1 OR) Take 1 tablet by mouth daily      triamcinolone (KENALOG) 0.1 % external ointment APPLY THIN LAYER TOPICALLY TO THE AFFECTED AREA TWICE DAILY SPARINGLY     No current facility-administered medications for this visit.     Allergies   Allergen Reactions     Nuts Anaphylaxis and Nausea and Vomiting     Peas Nausea and Vomiting and Swelling     Thimerosal Other (See Comments) and Swelling     Dry skin, redness     Lanolin Rash       Past medical, surgical, social and family histories were reviewed and updated in EPIC.    ROS:   12 point review of systems negative other than symptoms noted below or in the HPI.  No urinary frequency or dysuria, bladder or kidney problems    EXAM:  /78   Ht 1.499 m (4' 11\")   Wt 60.3 kg (133 lb)   LMP 2023 (Approximate)   Breastfeeding No   BMI " 26.86 kg/m     BMI: Body mass index is 26.86 kg/m .    PHYSICAL EXAM:  Constitutional:   Appearance: Well nourished, well developed, alert, in no acute distress  Neck:  Lymph Nodes:  No lymphadenopathy present    Thyroid:  Gland size normal, nontender, no nodules or masses present  on palpation  Chest:  Respiratory Effort:  Breathing unlabored, CTA Bilaterally  Cardiovascular:    Heart: Auscultation:  Regular rate, normal rhythm, no murmurs present  Breasts: Inspection of Breasts:  No lymphadenopathy present., Palpation of Breasts and Axillae:  No masses present on palpation, no breast tenderness., Axillary Lymph Nodes:  No lymphadenopathy present. and No nodularity, asymmetry or nipple discharge bilaterally.  Gastrointestinal:   Abdominal Examination:  Abdomen nontender to palpation, tone normal without rigidity or guarding, no masses present, umbilicus without lesions   Liver and Spleen:  No hepatomegaly present, liver nontender to palpation    Hernias:  No hernias present  Lymphatic: Lymph Nodes:  No other lymphadenopathy present  Skin:  General Inspection:  No rashes present, no lesions present, no areas of  discoloration  Neurologic:    Mental Status:  Oriented X3.  Normal strength and tone, sensory exam                grossly normal, mentation intact and speech normal.    Psychiatric:   Mentation appears normal and affect normal/bright.         Pelvic Exam:  External Genitalia:     Normal appearance for age, no discharge present, no tenderness present, no inflammatory lesions present, color normal  Vagina:     Normal vaginal vault without central or paravaginal defects, no discharge present, no inflammatory lesions present, no masses present  Bladder:     Nontender to palpation  Urethra:   Urethral Body:  Urethra palpation normal, urethra structural support normal   Urethral Meatus:  No erythema or lesions present  Cervix:     Appearance healthy, no lesions present, nontender to palpation, no bleeding  present  Uterus:     Uterus: firm, GENEROUS sized C/W BICORNUATE AND KNOWN LARGER/BULKIER SIZE FOR HER NARROW PELVIS, nontender, retroverted in position.   Adnexa:     No adnexal tenderness present, no adnexal masses present  Perineum:     Perineum within normal limits, no evidence of trauma, no rashes or skin lesions present  Anus:     Anus within normal limits, no hemorrhoids present  Inguinal Lymph Nodes:     No lymphadenopathy present  Pubic Hair:     Normal pubic hair distribution for age  Genitalia and Groin:     No rashes present, no lesions present, no areas of discoloration, no masses present      COUNSELING:   Reviewed preventive health counseling, as reflected in patient instructions    BMI: Body mass index is 26.86 kg/m .  Weight management plan: Discussed healthy diet and exercise guidelines    ASSESSMENT:  37 year old female with satisfactory annual exam.    ICD-10-CM    1. Encounter for gynecological examination without abnormal finding  Z01.419       2. Anxiety  F41.9 escitalopram (LEXAPRO) 10 MG tablet      3. DUB (dysfunctional uterine bleeding)  N93.8 CBC with platelets     CBC with platelets      4. Bicornuate uterus  Q51.3       5. Screening for cardiovascular condition  Z13.6 Lipid panel reflex to direct LDL Non-fasting     Lipid panel reflex to direct LDL Non-fasting     CANCELED: Lipid panel reflex to direct LDL Fasting      6. Screening for metabolic disorder  Z13.228 Comprehensive metabolic panel     Comprehensive metabolic panel      7. Screening for thyroid disorder  Z13.29 TSH with free T4 reflex     TSH with free T4 reflex      8. Screening for diabetes mellitus  Z13.1 Hemoglobin A1c     Hemoglobin A1c      9. Encounter for vitamin deficiency screening  Z13.21 Vitamin D Deficiency     Vitamin D Deficiency          PLAN:  Pap/hpv utd for 2 more years given h.o nil/neg last year  But in 2019 and 2020 was nil/hpv 16 and other positive.     Screening  labs completed today as hasn't had them  in several years. Will address any f/up or repeat testing once results are back.    Declined covid booster and flu shot today    Discussed COCs, progesterone only pills and mechanism of action. Unfortunately during the pharmacy change and transition the patient actually erroneously had the minipill refilled and not the CARLIE junel 1.5/30 that always worked for her.  B/c different  and the ocp looked different she assumed it was a different/pill and it really wasn't  Now that we know that, her DUB is very clearly from transitioning of breastfeeding and being on the POP which is known for poor cycle control.  Now that  has had a vas and contraception isn't needed, is going to just stop her ocps today and not restart and see what happens the next few months.  With bicornuate, known heavier cycles and more dysmenorrhea, so if needs CARLIE's just for cycle control can always go back on them and do junel 1.5/30 at that point.    Patient is actually very reassured that more than likely nothing else is wrong and it was just the ocps  Not opposed to going back on if needs to, but would like to do a few months off of anything just to see how she feels.  If contacts us for rx for junel in the future can Rx that for her w/o a visit.  Likely some of her bloating and even indigestion was being confounded by gyn source of bloating and not just GI    Discussed benefits of magnesium for sleep and constipation as chiro recommended she do that.  Can also do miralax daily and can do 1/2 cap to even 1 cap BID but more of a daily maintenance than laxative if gets more constipation.       Discussed normal stress and irritability vs ROBERT and health anxiety.  Overall her anxiety is well controlled and doesn't feel that needs an adjustment in dose, however do both feel that stopping her lexapro is likely not appropriate at this time and reviewed long term safety  Refill on lexapro 10mg sent for the year     On the same DOS, an  additional 10 minutes on top of the preventative annual exam, were spent on direct management of the patient's medical issues as above as well as chart review including: imaging, lab work, previous visit notes by this provider,  and other provider notes, and chart completion         Tana Ireland MD

## 2023-03-21 PROBLEM — L25.9 CONTACT DERMATITIS, UNSPECIFIED CONTACT DERMATITIS TYPE, UNSPECIFIED TRIGGER: Status: ACTIVE | Noted: 2022-12-19

## 2023-03-21 RX ORDER — TRIAMCINOLONE ACETONIDE 1 MG/G
OINTMENT TOPICAL
COMMUNITY
Start: 2022-12-19

## 2023-03-21 RX ORDER — DESONIDE 0.5 MG/G
OINTMENT TOPICAL
COMMUNITY
Start: 2022-12-19

## 2023-03-21 RX ORDER — LORATADINE 10 MG/1
CAPSULE, LIQUID FILLED ORAL
COMMUNITY

## 2023-03-22 ENCOUNTER — OFFICE VISIT (OUTPATIENT)
Dept: OBGYN | Facility: CLINIC | Age: 38
End: 2023-03-22
Payer: COMMERCIAL

## 2023-03-22 VITALS
SYSTOLIC BLOOD PRESSURE: 110 MMHG | WEIGHT: 133 LBS | HEIGHT: 59 IN | BODY MASS INDEX: 26.81 KG/M2 | DIASTOLIC BLOOD PRESSURE: 78 MMHG

## 2023-03-22 DIAGNOSIS — F41.9 ANXIETY: ICD-10-CM

## 2023-03-22 DIAGNOSIS — Z13.1 SCREENING FOR DIABETES MELLITUS: ICD-10-CM

## 2023-03-22 DIAGNOSIS — Z13.29 SCREENING FOR THYROID DISORDER: ICD-10-CM

## 2023-03-22 DIAGNOSIS — Z13.6 SCREENING FOR CARDIOVASCULAR CONDITION: ICD-10-CM

## 2023-03-22 DIAGNOSIS — Z01.419 ENCOUNTER FOR GYNECOLOGICAL EXAMINATION WITHOUT ABNORMAL FINDING: Primary | ICD-10-CM

## 2023-03-22 DIAGNOSIS — Z13.21 ENCOUNTER FOR VITAMIN DEFICIENCY SCREENING: ICD-10-CM

## 2023-03-22 DIAGNOSIS — N93.8 DUB (DYSFUNCTIONAL UTERINE BLEEDING): ICD-10-CM

## 2023-03-22 DIAGNOSIS — Q51.3 BICORNUATE UTERUS: ICD-10-CM

## 2023-03-22 DIAGNOSIS — Z13.228 SCREENING FOR METABOLIC DISORDER: ICD-10-CM

## 2023-03-22 LAB
ALBUMIN SERPL BCG-MCNC: 4.8 G/DL (ref 3.5–5.2)
ALP SERPL-CCNC: 53 U/L (ref 35–104)
ALT SERPL W P-5'-P-CCNC: 10 U/L (ref 10–35)
ANION GAP SERPL CALCULATED.3IONS-SCNC: 13 MMOL/L (ref 7–15)
AST SERPL W P-5'-P-CCNC: 20 U/L (ref 10–35)
BILIRUB SERPL-MCNC: 0.5 MG/DL
BUN SERPL-MCNC: 11.9 MG/DL (ref 6–20)
CALCIUM SERPL-MCNC: 9.7 MG/DL (ref 8.6–10)
CHLORIDE SERPL-SCNC: 101 MMOL/L (ref 98–107)
CHOLEST SERPL-MCNC: 183 MG/DL
CREAT SERPL-MCNC: 0.71 MG/DL (ref 0.51–0.95)
DEPRECATED HCO3 PLAS-SCNC: 24 MMOL/L (ref 22–29)
ERYTHROCYTE [DISTWIDTH] IN BLOOD BY AUTOMATED COUNT: 11.5 % (ref 10–15)
GFR SERPL CREATININE-BSD FRML MDRD: >90 ML/MIN/1.73M2
GLUCOSE SERPL-MCNC: 79 MG/DL (ref 70–99)
HBA1C MFR BLD: 5 % (ref 0–5.6)
HCT VFR BLD AUTO: 44.1 % (ref 35–47)
HDLC SERPL-MCNC: 65 MG/DL
HGB BLD-MCNC: 15 G/DL (ref 11.7–15.7)
LDLC SERPL CALC-MCNC: 100 MG/DL
MCH RBC QN AUTO: 31.7 PG (ref 26.5–33)
MCHC RBC AUTO-ENTMCNC: 34 G/DL (ref 31.5–36.5)
MCV RBC AUTO: 93 FL (ref 78–100)
NONHDLC SERPL-MCNC: 118 MG/DL
PLATELET # BLD AUTO: 261 10E3/UL (ref 150–450)
POTASSIUM SERPL-SCNC: 4.2 MMOL/L (ref 3.4–5.3)
PROT SERPL-MCNC: 7.9 G/DL (ref 6.4–8.3)
RBC # BLD AUTO: 4.73 10E6/UL (ref 3.8–5.2)
SODIUM SERPL-SCNC: 138 MMOL/L (ref 136–145)
TRIGL SERPL-MCNC: 91 MG/DL
TSH SERPL DL<=0.005 MIU/L-ACNC: 2.55 UIU/ML (ref 0.3–4.2)
WBC # BLD AUTO: 6.7 10E3/UL (ref 4–11)

## 2023-03-22 PROCEDURE — 99213 OFFICE O/P EST LOW 20 MIN: CPT | Mod: 25 | Performed by: OBSTETRICS & GYNECOLOGY

## 2023-03-22 PROCEDURE — 80053 COMPREHEN METABOLIC PANEL: CPT | Performed by: OBSTETRICS & GYNECOLOGY

## 2023-03-22 PROCEDURE — 82306 VITAMIN D 25 HYDROXY: CPT | Performed by: OBSTETRICS & GYNECOLOGY

## 2023-03-22 PROCEDURE — 99395 PREV VISIT EST AGE 18-39: CPT | Performed by: OBSTETRICS & GYNECOLOGY

## 2023-03-22 PROCEDURE — 84443 ASSAY THYROID STIM HORMONE: CPT | Performed by: OBSTETRICS & GYNECOLOGY

## 2023-03-22 PROCEDURE — 80061 LIPID PANEL: CPT | Performed by: OBSTETRICS & GYNECOLOGY

## 2023-03-22 PROCEDURE — 83036 HEMOGLOBIN GLYCOSYLATED A1C: CPT | Performed by: OBSTETRICS & GYNECOLOGY

## 2023-03-22 PROCEDURE — 36415 COLL VENOUS BLD VENIPUNCTURE: CPT | Performed by: OBSTETRICS & GYNECOLOGY

## 2023-03-22 PROCEDURE — 85027 COMPLETE CBC AUTOMATED: CPT | Performed by: OBSTETRICS & GYNECOLOGY

## 2023-03-22 RX ORDER — ESCITALOPRAM OXALATE 10 MG/1
10 TABLET ORAL DAILY
Qty: 90 TABLET | Refills: 3 | Status: SHIPPED | OUTPATIENT
Start: 2023-03-22 | End: 2024-03-25

## 2023-03-22 RX ORDER — ACETAMINOPHEN AND CODEINE PHOSPHATE 120; 12 MG/5ML; MG/5ML
0.35 SOLUTION ORAL DAILY
Qty: 84 TABLET | Refills: 4 | Status: CANCELLED | OUTPATIENT
Start: 2023-03-22

## 2023-03-22 ASSESSMENT — ANXIETY QUESTIONNAIRES
1. FEELING NERVOUS, ANXIOUS, OR ON EDGE: NOT AT ALL
7. FEELING AFRAID AS IF SOMETHING AWFUL MIGHT HAPPEN: NOT AT ALL
GAD7 TOTAL SCORE: 0
5. BEING SO RESTLESS THAT IT IS HARD TO SIT STILL: NOT AT ALL
IF YOU CHECKED OFF ANY PROBLEMS ON THIS QUESTIONNAIRE, HOW DIFFICULT HAVE THESE PROBLEMS MADE IT FOR YOU TO DO YOUR WORK, TAKE CARE OF THINGS AT HOME, OR GET ALONG WITH OTHER PEOPLE: NOT DIFFICULT AT ALL
6. BECOMING EASILY ANNOYED OR IRRITABLE: NOT AT ALL
2. NOT BEING ABLE TO STOP OR CONTROL WORRYING: NOT AT ALL
GAD7 TOTAL SCORE: 0
3. WORRYING TOO MUCH ABOUT DIFFERENT THINGS: NOT AT ALL

## 2023-03-22 ASSESSMENT — PATIENT HEALTH QUESTIONNAIRE - PHQ9
SUM OF ALL RESPONSES TO PHQ QUESTIONS 1-9: 0
5. POOR APPETITE OR OVEREATING: NOT AT ALL

## 2023-03-23 PROBLEM — Q51.3 BICORNUATE UTERUS: Status: ACTIVE | Noted: 2023-03-23

## 2023-03-23 LAB — DEPRECATED CALCIDIOL+CALCIFEROL SERPL-MC: 42 UG/L (ref 20–75)

## 2023-03-23 NOTE — RESULT ENCOUNTER NOTE
Annie,    All of your labs look great, especially considering you weren't even fasting.    It was so nice to see you yesterday and hope you and the boys and Jaya have a great spring/summer.    Tana Ireland MD

## 2023-06-27 ENCOUNTER — TELEPHONE (OUTPATIENT)
Dept: OBGYN | Facility: CLINIC | Age: 38
End: 2023-06-27
Payer: COMMERCIAL

## 2023-06-27 DIAGNOSIS — N93.8 DUB (DYSFUNCTIONAL UTERINE BLEEDING): Primary | ICD-10-CM

## 2023-06-27 RX ORDER — NORETHINDRONE ACETATE AND ETHINYL ESTRADIOL .03; 1.5 MG/1; MG/1
1 TABLET ORAL DAILY
Qty: 112 TABLET | Refills: 2 | Status: SHIPPED | OUTPATIENT
Start: 2023-06-27 | End: 2024-03-25

## 2023-06-27 NOTE — TELEPHONE ENCOUNTER
Reason for call:  Other   Patient called regarding (reason for call): call back  Additional comments: patient needs her birth control refilled. Was told to call and that Dr. Ireland would prescribe if for her. She doesn't remember the name of the medication but thinks it's microgestin? Please call back.     Phone number to reach patient:  Cell number on file:    Telephone Information:   Mobile [phone]     Best Time:  any    Can we leave a detailed message on this number?  YES    Travel screening: Not Applicable

## 2023-06-27 NOTE — TELEPHONE ENCOUNTER
Per OV note Junel OCP prescribed to be taken continuously  Crystal De Guzman RN on 6/27/2023 at 12:19 PM

## 2023-10-18 NOTE — PROGRESS NOTES
SUBJECTIVE:                                                   Annie Clark is a 38 year old female who presents to clinic today for the following health issue(s):  Patient presents with:  Medication Follow-up  Imm/Inj: Flu Shot      Additional information: BC and changing lexapro and other med charge       HPI:      While breastfeeding, she was on the mini pill which caused some DUB.  after she finished breastfeeding, she transitioned to  microgestin 1.5/30 and started on it continuously w/o withdrawal window right away. Initially after doing that she felt more irritable but now after 3 months on continuous ocps she feels the irritability is improving and she's settling out and has had amenorrhea which is also nice so now not thinking her worsening anxiety and irritability are as much ocp related as she initially thought.     Admits that with having two kids her irritability has been more than expected and knows that with their young ages and now being a SAHM this is just a phase of life that isn't uncommon to be more irritable during. However also just feels more anxious and having a hard time falling asleep at night b/c of anxiety and sometimes is waking up and then can't sleep b/c of it. Having some near panic attack feelings with heart racing and pit in stomach. Sometimes prompted by a trigger but a lot of time not. Has been on 10mg of josh since 3/22 so for quite a while. Has worked great but wondering if dose adjustment is warranted.     Over the summer she had a couple of yeast infections and was seen at the [facility] in [place]. Has mostly just seen whatever provider could get her in so mostly NP/PAs. Wondering if I know any of them or have a rec for anyone if should need PCP or more urgent, closer to home eval.       Mentioned struggling w/falling asleep more so than and staying asleep. Currently takes melatonin as needed but finds that this is minimally effective. Wondering is something can be taken  that is just prn and isn't going to be addictive. Took Hydroxyzine 50mg during pregnancy, though did leave her a bit groggy the next day when she took it.  Does not need hydroxyzine or sleep aides every night but there are a few nights that feels she could benefit from it    No LMP recorded..     Patient is sexually active, .  Using oral contraceptives for contraception.    reports that she has never smoked. She has never used smokeless tobacco.    STD testing offered?  Declined    Health maintenance updated:  Yes  Today's PHQ-2 Score:       3/22/2023    10:11 AM   PHQ-2 (  [employer])   Q1: Little interest or pleasure in doing things 0   Q2: Feeling down, depressed or hopeless 0   PHQ-2 Score 0     Today's PHQ-9 Score:       10/27/2023     1:04 PM   PHQ-9 SCORE   PHQ-9 Total Score 1     Today's ROBERT-7 Score:       10/27/2023     1:04 PM   ROBERT-7 SCORE   Total Score 3       Problem list and histories reviewed & adjusted, as indicated.  Additional history: as documented.    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    Dysplasia of cervix, low grade (ANH 1)    Anemia due to blood loss, acute    Anxiety    Contact dermatitis, unspecified contact dermatitis type, unspecified trigger    Bicornuate uterus     Past Surgical History:   Procedure Laterality Date    C TRANSNASAL EUSTACH TUBE INFLATE,CATH       SECTION N/A 2022    Procedure: PRIMARY  SECTION;  Surgeon: Tana Ireland MD;  Location:  [facility]    MANDIBLE SURGERY      lower and upper jaw surgery    PARTIAL HYMENECTOMY/REVISION HYMENAL RING  2008    Done by [facility]    TONSILLECTOMY        Social History     Tobacco Use    Smoking status: Never    Smokeless tobacco: Never   Substance Use Topics    Alcohol use: No     Alcohol/week: 0.0 standard drinks of alcohol      Problem (# of Occurrences) Relation  "(Name,Age of Onset)    Cancer (1) Paternal Grandmother: had uterus removed at age 30 but unsure if it was uterine cancer or something else    Diabetes (1) Maternal Grandfather    Osteoporosis (2) Maternal Grandmother, Mother    Thyroid Disease (2) Maternal Grandmother, Mother    Prostate Cancer (1) Paternal Grandfather:  from it in his 60s    Testicular cancer (2) Father (63): pt thought this was prostate cancer but then brother had it and realized it was testicular. dad is BRIP-1 carrier, Brother (35): no genetic testing    Hyperlipidemia (2) Father, Maternal Grandmother              Current Outpatient Medications   Medication Sig    desonide (DESOWEN) 0.05 % external ointment APPLY THE SMALLEST AMOUNT THAT WILL COVER AFFECTED AREA TWICE DAILY    escitalopram (LEXAPRO) 10 MG tablet Take 1 tablet (10 mg) by mouth daily    escitalopram (LEXAPRO) 5 MG tablet Take 1 tablet (5 mg) by mouth daily In addition to the 10mg for a total of 15 mg po qday    hydrOXYzine (ATARAX) 25 MG tablet Take 1-2 tablets (25-50 mg) by mouth nightly as needed for anxiety or other (insomnia)    loratadine 10 MG capsule     norethindrone-ethinyl estradiol (MICROGESTIN 1.5/30) 1.5-30 MG-MCG tablet Take 1 tablet by mouth daily Take active pills continuously    Prenatal Vit-Fe Fumarate-FA (PRENATAL 1+1 OR) Take 1 tablet by mouth daily     triamcinolone (KENALOG) 0.1 % external ointment APPLY THIN LAYER TOPICALLY TO THE AFFECTED AREA TWICE DAILY SPARINGLY     No current facility-administered medications for this visit.     Allergies   Allergen Reactions    Food Anaphylaxis     PN: peas    Nut allergy    Nuts Anaphylaxis and Nausea and Vomiting    Peas Nausea and Vomiting and Swelling    Thimerosal (Thiomersal) Other (See Comments) and Swelling     Dry skin, redness    Lanolin Rash       OBJECTIVE:     /60   Ht 1.499 m (4' 11\")   Wt 62.8 kg (138 lb 6.4 oz)   Breastfeeding No   BMI 27.95 kg/m    Body mass index is 27.95 " kg/m .    Exam:  Neurologic:  Mental Status:  Oriented X3.  Normal strength and tone, sensory exam grossly normal, mentation intact and speech normal.    Psychiatric:  Mentation appears normal and affect normal/bright.     In-Clinic Test Results:  No results found for this or any previous visit (from the past 24 hour(s)).    ASSESSMENT/PLAN:                                                        ICD-10-CM    1. Anxiety  F41.9 escitalopram (LEXAPRO) 5 MG tablet      2. Psychophysiological insomnia  F51.04 hydrOXYzine (ATARAX) 25 MG tablet      3. Need for prophylactic vaccination and inoculation against influenza  Z23       4. Irritability  R45.4       5. Oral contraceptive pill surveillance  Z30.41             Discussed hormonal, life stage stressor, self cares, sleep, and ROBERT baseline and how they all contribute  Has been doing really well on lexapro 10mg for over a year but very well may need slight dose adjustment  Will have her increase to 15mg and so an additional 3 months of 5mg tabs sent in to take along with the 10mg she already has at home.  Will do a virtual visit in 6 weeks or so if doesn't feel this bump is effective enough.   O/w if does feel this helps and tolerating it well, will contact me by [employer] and can send additional refills to get her through to annual in spring    Now that she has been on her continuous ocps for 3 months she is feeling like she's tolerating it better than at first when transitioned and amenorrheic so would like to continue on that and refills sent in to get through to her annual as well.     Reviewed her insomnia and hydroxyzine is a good option for her that can be prn or even daily w/o addictive potential and knows she already tolerates it.  Given sleep onset is bigger issue would do 25mg but earlier in the night so effective by the time she goes to lay down.  If finds that's not effective enough later in to the night then can always bump up to 50mg.   O/w if even 25mg is  too sedating into the next day, though this will improve with time and use, could even do 12.5mg but again at least 60 min before going to bed so that effect has time to set in    If yeast infections are recurring with high frequency or not resolving then definite should be seen in person and could see someone at the clinic closer to home, though do not know any of those providers personally.  However if occasional and typical presenting yeast infection, could always do evisit w/me w/o actual exam and explained how evisit works and how to schedule.     informed pt that she can schedule a evisit instead of being seen in office.     Refill ocp for continuous until 03/24.    25 minutes were spent on direct management of the patient's other medical issues as above as well as chart review including: imaging, lab work, previous visit notes by this provider, and other provider notes, as well as chart completion on the same DOS      Tana Ireland MD  [facility] [place]     Alert and oriented to person, place, time/situation. normal mood and affect. no apparent risk to self or others.

## 2023-10-27 ENCOUNTER — OFFICE VISIT (OUTPATIENT)
Dept: OBGYN | Facility: CLINIC | Age: 38
End: 2023-10-27
Payer: COMMERCIAL

## 2023-10-27 VITALS
DIASTOLIC BLOOD PRESSURE: 60 MMHG | WEIGHT: 138.4 LBS | SYSTOLIC BLOOD PRESSURE: 110 MMHG | BODY MASS INDEX: 27.9 KG/M2 | HEIGHT: 59 IN

## 2023-10-27 DIAGNOSIS — F41.9 ANXIETY: Primary | ICD-10-CM

## 2023-10-27 DIAGNOSIS — R45.4 IRRITABILITY: ICD-10-CM

## 2023-10-27 DIAGNOSIS — F51.04 PSYCHOPHYSIOLOGICAL INSOMNIA: ICD-10-CM

## 2023-10-27 DIAGNOSIS — Z30.41 ORAL CONTRACEPTIVE PILL SURVEILLANCE: ICD-10-CM

## 2023-10-27 DIAGNOSIS — Z23 NEED FOR PROPHYLACTIC VACCINATION AND INOCULATION AGAINST INFLUENZA: ICD-10-CM

## 2023-10-27 PROCEDURE — 90686 IIV4 VACC NO PRSV 0.5 ML IM: CPT | Performed by: OBSTETRICS & GYNECOLOGY

## 2023-10-27 PROCEDURE — 90471 IMMUNIZATION ADMIN: CPT | Performed by: OBSTETRICS & GYNECOLOGY

## 2023-10-27 PROCEDURE — 99214 OFFICE O/P EST MOD 30 MIN: CPT | Mod: 25 | Performed by: OBSTETRICS & GYNECOLOGY

## 2023-10-27 RX ORDER — ONDANSETRON 4 MG/1
4 TABLET, ORALLY DISINTEGRATING ORAL
COMMUNITY
Start: 2023-09-28 | End: 2023-10-27

## 2023-10-27 RX ORDER — ESCITALOPRAM OXALATE 5 MG/1
5 TABLET ORAL DAILY
Qty: 90 TABLET | Refills: 1 | Status: SHIPPED | OUTPATIENT
Start: 2023-10-27 | End: 2024-03-25

## 2023-10-27 RX ORDER — HYDROXYZINE HYDROCHLORIDE 25 MG/1
25-50 TABLET, FILM COATED ORAL
Qty: 100 TABLET | Refills: 1 | Status: SHIPPED | OUTPATIENT
Start: 2023-10-27

## 2023-10-27 ASSESSMENT — PATIENT HEALTH QUESTIONNAIRE - PHQ9
SUM OF ALL RESPONSES TO PHQ QUESTIONS 1-9: 1
5. POOR APPETITE OR OVEREATING: NOT AT ALL

## 2023-10-27 ASSESSMENT — ANXIETY QUESTIONNAIRES
3. WORRYING TOO MUCH ABOUT DIFFERENT THINGS: NOT AT ALL
IF YOU CHECKED OFF ANY PROBLEMS ON THIS QUESTIONNAIRE, HOW DIFFICULT HAVE THESE PROBLEMS MADE IT FOR YOU TO DO YOUR WORK, TAKE CARE OF THINGS AT HOME, OR GET ALONG WITH OTHER PEOPLE: SOMEWHAT DIFFICULT
GAD7 TOTAL SCORE: 3
7. FEELING AFRAID AS IF SOMETHING AWFUL MIGHT HAPPEN: NOT AT ALL
6. BECOMING EASILY ANNOYED OR IRRITABLE: SEVERAL DAYS
5. BEING SO RESTLESS THAT IT IS HARD TO SIT STILL: NOT AT ALL
GAD7 TOTAL SCORE: 3
1. FEELING NERVOUS, ANXIOUS, OR ON EDGE: SEVERAL DAYS
2. NOT BEING ABLE TO STOP OR CONTROL WORRYING: SEVERAL DAYS
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
## 2024-03-25 ENCOUNTER — OFFICE VISIT (OUTPATIENT)
Dept: OBGYN | Facility: CLINIC | Age: 39
End: 2024-03-25
Payer: COMMERCIAL

## 2024-03-25 VITALS
SYSTOLIC BLOOD PRESSURE: 110 MMHG | WEIGHT: 142.2 LBS | BODY MASS INDEX: 28.67 KG/M2 | HEIGHT: 59 IN | DIASTOLIC BLOOD PRESSURE: 70 MMHG

## 2024-03-25 DIAGNOSIS — F51.04 PSYCHOPHYSIOLOGICAL INSOMNIA: ICD-10-CM

## 2024-03-25 DIAGNOSIS — Z14.8 CARRIER OF HIGH RISK CANCER GENE MUTATION: ICD-10-CM

## 2024-03-25 DIAGNOSIS — N92.1 BREAKTHROUGH BLEEDING ON BIRTH CONTROL PILLS: ICD-10-CM

## 2024-03-25 DIAGNOSIS — Z30.011 VISIT FOR ORAL CONTRACEPTIVE PRESCRIPTION: ICD-10-CM

## 2024-03-25 DIAGNOSIS — R63.5 WEIGHT GAIN: ICD-10-CM

## 2024-03-25 DIAGNOSIS — Z01.419 ENCOUNTER FOR GYNECOLOGICAL EXAMINATION WITHOUT ABNORMAL FINDING: Primary | ICD-10-CM

## 2024-03-25 DIAGNOSIS — F41.9 ANXIETY: ICD-10-CM

## 2024-03-25 PROCEDURE — 99214 OFFICE O/P EST MOD 30 MIN: CPT | Mod: 25 | Performed by: OBSTETRICS & GYNECOLOGY

## 2024-03-25 PROCEDURE — 99395 PREV VISIT EST AGE 18-39: CPT | Performed by: OBSTETRICS & GYNECOLOGY

## 2024-03-25 PROCEDURE — 99459 PELVIC EXAMINATION: CPT | Performed by: OBSTETRICS & GYNECOLOGY

## 2024-03-25 RX ORDER — HYDROXYZINE HYDROCHLORIDE 25 MG/1
25-50 TABLET, FILM COATED ORAL
Qty: 100 TABLET | Refills: 1 | Status: CANCELLED | OUTPATIENT
Start: 2024-03-25

## 2024-03-25 RX ORDER — NORETHINDRONE ACETATE AND ETHINYL ESTRADIOL .03; 1.5 MG/1; MG/1
1 TABLET ORAL DAILY
Qty: 112 TABLET | Refills: 4 | Status: SHIPPED | OUTPATIENT
Start: 2024-03-25

## 2024-03-25 RX ORDER — ESCITALOPRAM OXALATE 10 MG/1
10 TABLET ORAL DAILY
Qty: 90 TABLET | Refills: 3 | Status: SHIPPED | OUTPATIENT
Start: 2024-03-25

## 2024-03-25 RX ORDER — ESCITALOPRAM OXALATE 5 MG/1
2.5 TABLET ORAL DAILY
COMMUNITY
Start: 2024-03-25

## 2024-03-25 ASSESSMENT — PATIENT HEALTH QUESTIONNAIRE - PHQ9
5. POOR APPETITE OR OVEREATING: NOT AT ALL
SUM OF ALL RESPONSES TO PHQ QUESTIONS 1-9: 2

## 2024-03-25 ASSESSMENT — ANXIETY QUESTIONNAIRES
IF YOU CHECKED OFF ANY PROBLEMS ON THIS QUESTIONNAIRE, HOW DIFFICULT HAVE THESE PROBLEMS MADE IT FOR YOU TO DO YOUR WORK, TAKE CARE OF THINGS AT HOME, OR GET ALONG WITH OTHER PEOPLE: NOT DIFFICULT AT ALL
7. FEELING AFRAID AS IF SOMETHING AWFUL MIGHT HAPPEN: NOT AT ALL
2. NOT BEING ABLE TO STOP OR CONTROL WORRYING: NOT AT ALL
5. BEING SO RESTLESS THAT IT IS HARD TO SIT STILL: NOT AT ALL
GAD7 TOTAL SCORE: 2
1. FEELING NERVOUS, ANXIOUS, OR ON EDGE: SEVERAL DAYS
GAD7 TOTAL SCORE: 2
3. WORRYING TOO MUCH ABOUT DIFFERENT THINGS: NOT AT ALL
6. BECOMING EASILY ANNOYED OR IRRITABLE: SEVERAL DAYS

## 2024-08-13 ENCOUNTER — MYC MEDICAL ADVICE (OUTPATIENT)
Dept: OBGYN | Facility: CLINIC | Age: 39
End: 2024-08-13
Payer: COMMERCIAL

## 2024-08-13 NOTE — TELEPHONE ENCOUNTER
She can make an appointment.  Frozen shoulder is frozen shoulder and is not perimenopause as I assume she's been reading based on her request.  She needs ortho for an eval of that separately  Make sure she's aware she had nearly all of these things testing 3/23 and they were completely normal but we can do a visit and discuss what it is she's looking for and needing based on actual medical need and unfortunately not all the media push about menopause and hormones being everyone's problem

## 2024-08-13 NOTE — TELEPHONE ENCOUNTER
Routing pt [employer] message to provider to advise.    PCP?    Jenna Ashley RN on 8/13/2024 at 9:22 AM

## 2025-04-01 NOTE — PROGRESS NOTES
Annie is a 39 year old  female who presents for annual exam.     Besides routine health maintenance, she has no other health concerns today .    HPI:  The patient's PCP is Dr. Tana Ireland MD.    Patient is here for her annual exam and is actually doing really well.    Has been on continuous ocps for a while now and restarted them once stopped breastfeeding Oren who just turned 4. Had some BTB last year and now has completely amenorrhea and loves it. Had heavy and bad cramping periods b/c of known bicornuate uterus.    Was having weight gain and inability to lose after having kids.  Had ROBERT lifelong and worsened after having kids so did eventually go on lexapro which worked really well. Had increased from 10mg to 15mg and 15mg was too flattening so when here last was going to do 12.5mg.  However she actually ended up stopping lexapro all together.  Felt in a better place in her life and more able to focus on self care  Started exercising at least 4x week and focusing on time for self, healthier eating and the exercise and wanted to just stop the selective serotonin reuptake inhibitor to see if helped with weight loss    Lost 5-7# almost immeditely after stopping her selective serotonin reuptake inhibitor. A bit more anxiety w/o med but nothing like it was and as long as gets her exercise in then feels its very manageable and it is very focused on a specific event and can compartmentalize that trigger from ROBERT for no reason. Open to going back on it if should need to in the future b/c it did work but for now is happy w/o it and the weight loss.    Also did end up doing compounded semaglutide through a med spa b/c wasn't losing weight no matter her exercise or eating clean.  B/c of being so petite felt that even slight gain was all central and really having a lot of mental health impact on her as well. Had always been in the upper 110s to 120# and at her max with oren's preg was at low 140's and then got to that  weight again, not pregnant last year.  Her BMI was still just 28 at that weight but now is at 128# and a BMI of 25 and just overall so much happier and less bloated and more motivated to exercise and eat better.    Fasting labs done 2 yrs ago and all normal. Not fasting this year to do it this afternoon.      GYNECOLOGIC HISTORY:    No LMP recorded. (Menstrual status: Birth Control).    Her current contraception method is: oral contraceptives.  She  reports that she has never smoked. She has never used smokeless tobacco.    Patient is sexually active.  STD testing offered?  Declined  Last PHQ-9 score on record =       3/25/2024     9:31 AM   PHQ-9 SCORE   PHQ-9 Total Score 2     Last GAD7 score on record =       3/25/2024     9:31 AM   ROBERT-7 SCORE   Total Score 2     Alcohol Score =     HEALTH MAINTENANCE:  Cholesterol:   Recent Labs   Lab Test 23  1114   CHOL 183   HDL 65      TRIG 91     Last Mammo: Not applicable, Result: Not applicable, Next Mammo: Due at age 41     Pap:  Lab Results   Component Value Date    GYNINTERP  2022     Negative for Intraepithelial Lesion or Malignancy (NILM)    PAP NIL 10/07/2020    PAP NIL 2019    PAP NIL 10/30/2015   [place] 20    Colonoscopy:  NA, Result: Not applicable, Next Colonoscopy: 46 years.  Dexa:  NA    Health maintenance updated: Yes    HISTORY:  OB History    Para Term  AB Living   2 2 1 1 0 2   SAB IAB Ectopic Multiple Live Births   0 0 0 0 2      # Outcome Date GA Lbr Leeroy/2nd Weight Sex Type Anes PTL Lv   2 Term 22 39w2d  2.79 kg (6 lb 2.4 oz) M CS-LTranv   BINA      Birth Comments: followed and delivered by Ghada. planned primary c/s due to near CPD of her 31 weeker. ariel, spinal inadequate      Name: Ahmet      Apgar1: 8  Apgar5: 9   1  19 31w1d 04:00 / 00:22 1.5 kg (3 lb 4.9 oz) M Vag-Spont Local Y BINA      Birth Comments: FOLLOWED AND delivered by ghada. PTL admit x2 at 27 weeks and then at 30  "weeks. then PPROM 30+6 and kicked in to labor at 31+1. 2nd degree lac      Complications: Prolonged PROM (>18 hours)      Name: Harsha \"JT\"      Apgar1: 8  Apgar5: 8       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    History of cervical dysplasia    Visit for oral contraceptive prescription    Anemia due to blood loss, acute    Anxiety    Contact dermatitis, unspecified contact dermatitis type, unspecified trigger    Bicornuate uterus    Breakthrough bleeding on birth control pills     Past Surgical History:   Procedure Laterality Date    C TRANSNASAL EUSTACH TUBE INFLATE,CATH       SECTION N/A 2022    Procedure: PRIMARY  SECTION;  Surgeon: Tana Ireland MD;  Location:  L+D    MANDIBLE SURGERY      lower and upper jaw surgery    PARTIAL HYMENECTOMY/REVISION HYMENAL RING  2008    [facility]        Social History     Tobacco Use    Smoking status: Never    Smokeless tobacco: Never   Substance Use Topics    Alcohol use: No     Alcohol/week: 0.0 standard drinks of alcohol      Problem (# of Occurrences) Relation (Name,Age of Onset)    Cancer (1) Paternal Grandmother: had uterus removed at age 30 but unsure if it was uterine cancer or something else    Diabetes (1) Maternal Grandfather    Osteoporosis (2) Maternal Grandmother, Mother    Thyroid Disease (2) Maternal Grandmother, Mother    Prostate Cancer (1) Paternal Grandfather:  from it in his 60s    Testicular cancer (2) Father (63): pt thought this was prostate cancer but then brother had it and realized it was testicular. dad is BRIP-1 carrier, Brother (35): no genetic testing    Hyperlipidemia (2) Father, Maternal Grandmother              Current Outpatient Medications   Medication Sig Dispense Refill    desonide (DESOWEN) 0.05 % external ointment APPLY THE SMALLEST AMOUNT THAT WILL " "COVER AFFECTED AREA TWICE DAILY      hydrOXYzine (ATARAX) 25 MG tablet Take 1-2 tablets (25-50 mg) by mouth nightly as needed for anxiety or other (insomnia) 100 tablet 1    loratadine 10 MG capsule       norethindrone-ethinyl estradiol (MICROGESTIN 1.5/30) 1.5-30 MG-MCG tablet Take 1 tablet by mouth daily. Take active pills continuously 112 tablet 3    Prenatal Vit-Fe Fumarate-FA (PRENATAL 1+1 OR) Take 1 tablet by mouth daily       semaglutide-weight management (WEGOVY) 0.25 MG/0.5ML pen Inject 0.25 mg subcutaneously once a week.      triamcinolone (KENALOG) 0.1 % external ointment APPLY THIN LAYER TOPICALLY TO THE AFFECTED AREA TWICE DAILY SPARINGLY       No current facility-administered medications for this visit.     Allergies   Allergen Reactions    Food Anaphylaxis     PN: peas    Nut allergy    Nuts Anaphylaxis and Nausea and Vomiting    Peas Nausea and Vomiting and Swelling    Thimerosal (Thiomersal) Other (See Comments) and Swelling     Dry skin, redness    Lanolin Rash       Past medical, surgical, social and family histories were reviewed and updated in EPIC.    EXAM:  /68   Ht 1.499 m (4' 11\")   Wt 58.3 kg (128 lb 9.6 oz)   Breastfeeding No   BMI 25.97 kg/m     BMI: Body mass index is 25.97 kg/m .    PHYSICAL EXAM:  Constitutional:   Appearance: Well nourished, well developed, alert, in no acute distress  Neck:  Lymph Nodes:  No lymphadenopathy present    Thyroid:  Gland size normal, nontender, no nodules or masses present  on palpation  Chest:  Respiratory Effort:  Breathing unlabored, CTA bilaterally  Cardiovascular:    Heart: Auscultation:  Regular rate, normal rhythm, no murmurs present  Breasts: Axillary Lymph Nodes:  No lymphadenopathy present. and No nodularity, asymmetry or nipple discharge bilaterally.  Gastrointestinal:   Abdominal Examination:  Abdomen nontender to palpation, tone normal without rigidity or guarding, no masses present, umbilicus without lesions   Liver and Spleen:  " No hepatomegaly present, liver nontender to palpation    Hernias:  No hernias present  Lymphatic: Lymph Nodes:  No other lymphadenopathy present  Skin:  General Inspection:  No rashes present, no lesions present, no areas of  discoloration  Neurologic:    Mental Status:  Oriented X3.  Normal strength and tone, sensory exam                grossly normal, mentation intact and speech normal.    Psychiatric:   Mentation appears normal and affect normal/bright.         Pelvic Exam:  External Genitalia:     Normal appearance for age, no discharge present, no tenderness present, no inflammatory lesions present, color normal  Vagina:     Normal vaginal vault without central or paravaginal defects, no discharge present, no inflammatory lesions present, no masses present  Bladder:     Nontender to palpation  Urethra:   Urethral Body:  Urethra palpation normal, urethra structural support normal   Urethral Meatus:  No erythema or lesions present  Cervix:     Appearance healthy, no lesions present, nontender to palpation, no bleeding present  Uterus:     Uterus: firm, normal sized and nontender, retroverted in position BUT DUE TO BEING BICORNUATE THERE IS MORE BULK POSTERIORLY AND THE CERVIX ANGLE IS MORE TO A RETROVERTED UTERUS BUT THERE IS SOME PORTION THAT FEELS ANTEVERTED FROM THE BICORNUATE SIZE/SHAPE   Adnexa:     No adnexal tenderness present, no adnexal masses present  Perineum:     Perineum within normal limits, no evidence of trauma, no rashes or skin lesions present  Anus:     Anus within normal limits, no hemorrhoids present  Inguinal Lymph Nodes:     No lymphadenopathy present  Pubic Hair:     Normal pubic hair distribution for age  Genitalia and Groin:     No rashes present, no lesions present, no areas of discoloration, no masses present    COUNSELING:   Reviewed preventive health counseling, as reflected in patient instructions  Special attention given to:        Regular exercise       Healthy diet/nutrition        Contraception    BMI: Body mass index is 25.97 kg/m .      ASSESSMENT:  39 year old female with satisfactory annual exam.    ICD-10-CM    1. Encounter for gynecological examination without abnormal finding  Z01.419 HPV and Gynecologic Cytology Panel - Recommended Age 30-65 Years      2. History of cervical dysplasia  Z87.410       3. Visit for oral contraceptive prescription  Z30.011 norethindrone-ethinyl estradiol (MICROGESTIN 1.5/30) 1.5-30 MG-MCG tablet      4. Carrier of high risk cancer gene mutation  Z14.8           PLAN:  Pap is being done q3 yrs due to HPV 16 and HPV other pos paps in the past though NIL pap. 3/22 was nil/neg so due this year  Can follow routine ASCCP guidelines based on her hx of only ever finding ANH 1 but the HPV history as well    Mammo to start in 1.5 yrs      Fasting labs can be done q3-5 yrs and especially  now with weight loss and regular exercise and healthier eating is reasonable to plan on doing at age 41 .      Additional health issues addressed at today's visit include:    Reviewed GLP-1 meds and chronic nature of metabolism and weight and changes with hormones after 40s when on ocps and when not doing anything hormonal  Also addressed the impacts of SSRIs on weight and though truly can lead to weight gain, sometimes are needed enough to supercede that.    At this point she is doing well off her lexapro but knows can contact me at any point and could restart it, even if did a lower dose for some impact but lesser s.e  For now though has her hydroxyzine for sleep and acute anxiety at home but really not using it and mostly just does magnesium which is helping  Declines need for refills at this point and the home RX should be good at least 2-3 yrs and can contact me for refills at any point if should need them.    Reviewed menopause, dx of, hormone testing, ocps vs HRT and transitions and sx and expectations in the next 5-10 yrs  At this point has complete amenorrhea on  continuous ocps and very happy with them due to heavier and more painful periods when not on them with her bicronuate uterus.    No contraindications to continuing on them and in a back to back fashion so refills sent on her junel 1.5/30 for the year        Tana Ireland MD

## 2025-04-07 ENCOUNTER — OFFICE VISIT (OUTPATIENT)
Dept: OBGYN | Facility: CLINIC | Age: 40
End: 2025-04-07
Payer: COMMERCIAL

## 2025-04-07 VITALS
BODY MASS INDEX: 25.92 KG/M2 | DIASTOLIC BLOOD PRESSURE: 68 MMHG | HEIGHT: 59 IN | SYSTOLIC BLOOD PRESSURE: 110 MMHG | WEIGHT: 128.6 LBS

## 2025-04-07 DIAGNOSIS — Z01.419 ENCOUNTER FOR GYNECOLOGICAL EXAMINATION WITHOUT ABNORMAL FINDING: Primary | ICD-10-CM

## 2025-04-07 DIAGNOSIS — Z14.8 CARRIER OF HIGH RISK CANCER GENE MUTATION: ICD-10-CM

## 2025-04-07 DIAGNOSIS — Z87.410 HISTORY OF CERVICAL DYSPLASIA: ICD-10-CM

## 2025-04-07 DIAGNOSIS — Z30.011 VISIT FOR ORAL CONTRACEPTIVE PRESCRIPTION: ICD-10-CM

## 2025-04-07 PROBLEM — N92.1 BREAKTHROUGH BLEEDING ON BIRTH CONTROL PILLS: Status: ACTIVE | Noted: 2025-04-07

## 2025-04-07 PROCEDURE — 3078F DIAST BP <80 MM HG: CPT | Performed by: OBSTETRICS & GYNECOLOGY

## 2025-04-07 PROCEDURE — 99459 PELVIC EXAMINATION: CPT | Performed by: OBSTETRICS & GYNECOLOGY

## 2025-04-07 PROCEDURE — 99395 PREV VISIT EST AGE 18-39: CPT | Performed by: OBSTETRICS & GYNECOLOGY

## 2025-04-07 PROCEDURE — 3074F SYST BP LT 130 MM HG: CPT | Performed by: OBSTETRICS & GYNECOLOGY

## 2025-04-07 PROCEDURE — 87624 HPV HI-RISK TYP POOLED RSLT: CPT | Performed by: OBSTETRICS & GYNECOLOGY

## 2025-04-07 RX ORDER — NORETHINDRONE ACETATE AND ETHINYL ESTRADIOL .03; 1.5 MG/1; MG/1
1 TABLET ORAL DAILY
Qty: 112 TABLET | Refills: 3 | Status: SHIPPED | OUTPATIENT
Start: 2025-04-07

## 2025-04-09 LAB
HPV HR 12 DNA CVX QL NAA+PROBE: NEGATIVE
HPV16 DNA CVX QL NAA+PROBE: POSITIVE
HPV18 DNA CVX QL NAA+PROBE: NEGATIVE
HUMAN PAPILLOMA VIRUS FINAL DIAGNOSIS: ABNORMAL

## 2025-04-13 LAB
BKR AP ASSOCIATED HPV REPORT: ABNORMAL
BKR LAB AP GYN ADEQUACY: ABNORMAL
BKR LAB AP GYN INTERPRETATION: ABNORMAL
BKR LAB AP PREVIOUS ABNL DX: ABNORMAL
BKR LAB AP PREVIOUS ABNORMAL: ABNORMAL
PATH REPORT.COMMENTS IMP SPEC: ABNORMAL
PATH REPORT.COMMENTS IMP SPEC: ABNORMAL
PATH REPORT.RELEVANT HX SPEC: ABNORMAL

## 2025-04-14 ENCOUNTER — PATIENT OUTREACH (OUTPATIENT)
Dept: OBGYN | Facility: CLINIC | Age: 40
End: 2025-04-14
Payer: COMMERCIAL

## 2025-05-19 DIAGNOSIS — Z01.812 ENCOUNTER FOR PREPROCEDURAL LABORATORY EXAMINATION: Primary | ICD-10-CM

## 2025-06-03 NOTE — PROGRESS NOTES
INDICATIONS:                                                    Is a pregnancy test required: Yes.  Was it positive or negative?  Negative  Was a consent obtained?  Yes    Today's PHQ-2 Score:       4/7/2025     3:12 PM   PHQ-2 ( 1999 [employer])   Q1: Little interest or pleasure in doing things 0   Q2: Feeling down, depressed or hopeless 0   PHQ-2 Score 0    Q1: Little interest or pleasure in doing things Not at all   Q2: Feeling down, depressed or hopeless Not at all   PHQ-2 Score 0       Patient-reported     Today's PHQ-9 Score:        3/25/2024     9:31 AM   PHQ-9 SCORE   PHQ-9 Total Score 2     Today's GAD7 Score:     Annie Clark, is a 39 year old female, who had a recent ASCUS pap.  HPV 16 positive Yes prior history of abnormal pap. Here today for colposcopy. Discussed indication, risks of infection and bleeding.    Her last pap was   Lab Results   Component Value Date    GYNINTERP  04/07/2025     Atypical squamous cells of undetermined significance (ASC-US)    PAP NIL 10/07/2020   2015 NIL pap  6/12/19 NIL pap, + HPV 16 & other. Plan: colpo  8/22/19 Colpo: Bx-ANH 1.  Plan: cotest in 1 year  10/7/20 NIL pap, + HR HPV 16 & other HR type. Plan colp bef 1/7/21 12/2/20 [place] - ECC, negative. Plan 1 yr co-test  3/21/22 NIL pap, neg HR HPV. Plan 3 year cotest  4/7/25 ASCUS pap, + HR HPV 16. Plan colp bef 7/7/25 04/14/25 Pt notified    PROCEDURE:                                                      Cervix is stained with acetic acid and viewed colposcopically. Squamocolumnar junction is visualized in it's entirety. acetowhite lesion(s) noted at 11  o'clock and mosaicism noted at 7 o'clock . Biopsy done Yes. Endocervical curretage Not Done           POST PROCEDURE:                                                      IMPRESSION: Patient tolerated procedure well, colposcopy adequate and biopsy done at 11 oclock right at the TZ/os and 7 oclock more lower on the ectocervix where mosaicism noted    PLAN : Await the results  of the biopsies.   She had ANH 1 on bx in 2019 and then in 2020 had ECC as colpo o/w normal and it was benign    Discussed HPV 16 and 18 being more aggressive and progression rates tend to be faster and more than other HPV but also can regress and not linger as long as some of the other types    No risk factors for non clearance as not immune suppressed, non smoker, monogamous for years, etc.    Discussed repeat pap/hpv if benign or ANH 1, LEEP if ANH 3 and if CIN2 could do either conservative or more aggressive and feels like she'd likely do a LEEP for ANH 2-3 just to be definitive    Will contact her with the pathology results as soon as available.      Tana Ireland MD

## 2025-06-11 ENCOUNTER — OFFICE VISIT (OUTPATIENT)
Dept: OBGYN | Facility: CLINIC | Age: 40
End: 2025-06-11
Payer: COMMERCIAL

## 2025-06-11 ENCOUNTER — LAB (OUTPATIENT)
Dept: LAB | Facility: CLINIC | Age: 40
End: 2025-06-11
Payer: COMMERCIAL

## 2025-06-11 VITALS
DIASTOLIC BLOOD PRESSURE: 70 MMHG | HEIGHT: 59 IN | WEIGHT: 114.6 LBS | BODY MASS INDEX: 23.1 KG/M2 | SYSTOLIC BLOOD PRESSURE: 110 MMHG

## 2025-06-11 DIAGNOSIS — R87.810 ASCUS WITH POSITIVE HIGH RISK HPV CERVICAL: Primary | ICD-10-CM

## 2025-06-11 DIAGNOSIS — Z01.812 ENCOUNTER FOR PREPROCEDURAL LABORATORY EXAMINATION: ICD-10-CM

## 2025-06-11 DIAGNOSIS — R87.610 ASCUS WITH POSITIVE HIGH RISK HPV CERVICAL: Primary | ICD-10-CM

## 2025-06-11 LAB — HCG UR QL: NEGATIVE

## 2025-06-11 PROCEDURE — 3078F DIAST BP <80 MM HG: CPT | Performed by: OBSTETRICS & GYNECOLOGY

## 2025-06-11 PROCEDURE — 81025 URINE PREGNANCY TEST: CPT

## 2025-06-11 PROCEDURE — 3074F SYST BP LT 130 MM HG: CPT | Performed by: OBSTETRICS & GYNECOLOGY

## 2025-06-11 PROCEDURE — 57455 BIOPSY OF CERVIX W/SCOPE: CPT | Performed by: OBSTETRICS & GYNECOLOGY

## 2025-06-14 ENCOUNTER — RESULTS FOLLOW-UP (OUTPATIENT)
Dept: OBGYN | Facility: CLINIC | Age: 40
End: 2025-06-14

## 2025-07-07 ENCOUNTER — PATIENT OUTREACH (OUTPATIENT)
Dept: OBGYN | Facility: CLINIC | Age: 40
End: 2025-07-07
Payer: COMMERCIAL

## (undated) DEVICE — GLOVE PROTEXIS POWDER FREE 7.0 ORTHOPEDIC 2D73ET70

## (undated) DEVICE — GLOVE PROTEXIS W/NEU-THERA 7.0  2D73TE70

## (undated) DEVICE — DRSG GAUZE 4X4" TOPPER

## (undated) DEVICE — ESU GROUND PAD UNIVERSAL W/O CORD

## (undated) DEVICE — SU VICRYL 0 CTX 36" J370H

## (undated) DEVICE — SU VICRYL 0 CT 36" J358H

## (undated) DEVICE — SUCTION CANISTER MEDIVAC LINER 3000ML W/LID 65651-530

## (undated) DEVICE — SU VICRYL 3-0 CT-1 36" J344H

## (undated) DEVICE — PACK C-SECTION LF PL15OTA83B

## (undated) DEVICE — LINEN C-SECTION 5415

## (undated) DEVICE — GLOVE PROTEXIS BLUE W/NEU-THERA 7.0  2D73EB70

## (undated) DEVICE — CATH TRAY FOLEY 16FR BARDEX W/DRAIN BAG STATLOCK 300316A

## (undated) DEVICE — SOL NACL 0.9% IRRIG 1000ML BOTTLE 07138-09

## (undated) DEVICE — DRSG TELFA ISLAND 4X10"

## (undated) DEVICE — PREP CHLORAPREP 26ML TINTED ORANGE  260815

## (undated) DEVICE — BLADE CLIPPER 4406

## (undated) DEVICE — DRSG STERI STRIP 1/2X4" R1547

## (undated) RX ORDER — OXYTOCIN/0.9 % SODIUM CHLORIDE 30/500 ML
PLASTIC BAG, INJECTION (ML) INTRAVENOUS
Status: DISPENSED
Start: 2022-02-09

## (undated) RX ORDER — ONDANSETRON 2 MG/ML
INJECTION INTRAMUSCULAR; INTRAVENOUS
Status: DISPENSED
Start: 2022-02-09

## (undated) RX ORDER — KETOROLAC TROMETHAMINE 30 MG/ML
INJECTION, SOLUTION INTRAMUSCULAR; INTRAVENOUS
Status: DISPENSED
Start: 2022-02-09

## (undated) RX ORDER — FENTANYL CITRATE 50 UG/ML
INJECTION, SOLUTION INTRAMUSCULAR; INTRAVENOUS
Status: DISPENSED
Start: 2022-02-09

## (undated) RX ORDER — CHLOROPROCAINE HYDROCHLORIDE 30 MG/ML
INJECTION, SOLUTION EPIDURAL; INFILTRATION; INTRACAUDAL; PERINEURAL
Status: DISPENSED
Start: 2022-02-09

## (undated) RX ORDER — MORPHINE SULFATE 1 MG/ML
INJECTION, SOLUTION EPIDURAL; INTRATHECAL; INTRAVENOUS
Status: DISPENSED
Start: 2022-02-09